# Patient Record
Sex: MALE | Race: WHITE | NOT HISPANIC OR LATINO
[De-identification: names, ages, dates, MRNs, and addresses within clinical notes are randomized per-mention and may not be internally consistent; named-entity substitution may affect disease eponyms.]

---

## 2018-10-05 PROBLEM — Z00.00 ENCOUNTER FOR PREVENTIVE HEALTH EXAMINATION: Status: ACTIVE | Noted: 2018-10-05

## 2018-10-11 ENCOUNTER — APPOINTMENT (OUTPATIENT)
Dept: PULMONOLOGY | Facility: CLINIC | Age: 83
End: 2018-10-11
Payer: MEDICARE

## 2018-10-11 VITALS
SYSTOLIC BLOOD PRESSURE: 120 MMHG | WEIGHT: 170 LBS | HEART RATE: 57 BPM | TEMPERATURE: 97.6 F | OXYGEN SATURATION: 97 % | HEIGHT: 71 IN | DIASTOLIC BLOOD PRESSURE: 70 MMHG | BODY MASS INDEX: 23.8 KG/M2

## 2018-10-11 DIAGNOSIS — R43.0 ANOSMIA: ICD-10-CM

## 2018-10-11 PROCEDURE — 99202 OFFICE O/P NEW SF 15 MIN: CPT | Mod: 25

## 2018-10-11 PROCEDURE — 94010 BREATHING CAPACITY TEST: CPT

## 2022-01-01 ENCOUNTER — INPATIENT (INPATIENT)
Facility: HOSPITAL | Age: 87
LOS: 14 days | DRG: 312 | End: 2022-01-30
Attending: STUDENT IN AN ORGANIZED HEALTH CARE EDUCATION/TRAINING PROGRAM | Admitting: INTERNAL MEDICINE
Payer: MEDICARE

## 2022-01-01 VITALS
HEART RATE: 67 BPM | OXYGEN SATURATION: 97 % | TEMPERATURE: 98 F | SYSTOLIC BLOOD PRESSURE: 156 MMHG | DIASTOLIC BLOOD PRESSURE: 76 MMHG | RESPIRATION RATE: 17 BRPM

## 2022-01-01 VITALS — RESPIRATION RATE: 20 BRPM | OXYGEN SATURATION: 93 % | HEART RATE: 71 BPM

## 2022-01-01 DIAGNOSIS — E44.0 MODERATE PROTEIN-CALORIE MALNUTRITION: ICD-10-CM

## 2022-01-01 DIAGNOSIS — R45.1 RESTLESSNESS AND AGITATION: ICD-10-CM

## 2022-01-01 DIAGNOSIS — I12.9 HYPERTENSIVE CHRONIC KIDNEY DISEASE WITH STAGE 1 THROUGH STAGE 4 CHRONIC KIDNEY DISEASE, OR UNSPECIFIED CHRONIC KIDNEY DISEASE: ICD-10-CM

## 2022-01-01 DIAGNOSIS — R79.89 OTHER SPECIFIED ABNORMAL FINDINGS OF BLOOD CHEMISTRY: ICD-10-CM
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
## 2022-01-01 DIAGNOSIS — I65.29 OCCLUSION AND STENOSIS OF UNSPECIFIED CAROTID ARTERY: ICD-10-CM

## 2022-01-01 DIAGNOSIS — R53.81 OTHER MALAISE: ICD-10-CM

## 2022-01-01 DIAGNOSIS — D63.1 ANEMIA IN CHRONIC KIDNEY DISEASE: ICD-10-CM

## 2022-01-01 DIAGNOSIS — N17.9 ACUTE KIDNEY FAILURE, UNSPECIFIED: ICD-10-CM
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
## 2022-01-01 DIAGNOSIS — E87.3 ALKALOSIS: ICD-10-CM

## 2022-01-01 DIAGNOSIS — J96.01 ACUTE RESPIRATORY FAILURE WITH HYPOXIA: ICD-10-CM

## 2022-01-01 DIAGNOSIS — G93.40 ENCEPHALOPATHY, UNSPECIFIED: ICD-10-CM

## 2022-01-01 DIAGNOSIS — Z29.9 ENCOUNTER FOR PROPHYLACTIC MEASURES, UNSPECIFIED: ICD-10-CM

## 2022-01-01 DIAGNOSIS — M19.041 PRIMARY OSTEOARTHRITIS, RIGHT HAND: ICD-10-CM

## 2022-01-01 DIAGNOSIS — R77.8 OTHER SPECIFIED ABNORMALITIES OF PLASMA PROTEINS: ICD-10-CM

## 2022-01-01 DIAGNOSIS — I48.91 UNSPECIFIED ATRIAL FIBRILLATION: ICD-10-CM

## 2022-01-01 DIAGNOSIS — N18.30 CHRONIC KIDNEY DISEASE, STAGE 3 UNSPECIFIED: ICD-10-CM

## 2022-01-01 DIAGNOSIS — G30.1 ALZHEIMER'S DISEASE WITH LATE ONSET: ICD-10-CM

## 2022-01-01 DIAGNOSIS — E78.5 HYPERLIPIDEMIA, UNSPECIFIED: ICD-10-CM

## 2022-01-01 DIAGNOSIS — R74.01 ELEVATION OF LEVELS OF LIVER TRANSAMINASE LEVELS: ICD-10-CM

## 2022-01-01 DIAGNOSIS — R55 SYNCOPE AND COLLAPSE: ICD-10-CM

## 2022-01-01 DIAGNOSIS — D64.9 ANEMIA, UNSPECIFIED: ICD-10-CM

## 2022-01-01 DIAGNOSIS — G93.89 OTHER SPECIFIED DISORDERS OF BRAIN: ICD-10-CM

## 2022-01-01 DIAGNOSIS — R00.1 BRADYCARDIA, UNSPECIFIED: ICD-10-CM

## 2022-01-01 DIAGNOSIS — I50.9 HEART FAILURE, UNSPECIFIED: ICD-10-CM

## 2022-01-01 DIAGNOSIS — Z95.828 PRESENCE OF OTHER VASCULAR IMPLANTS AND GRAFTS: Chronic | ICD-10-CM

## 2022-01-01 DIAGNOSIS — S00.03XA CONTUSION OF SCALP, INITIAL ENCOUNTER: ICD-10-CM

## 2022-01-01 DIAGNOSIS — I10 ESSENTIAL (PRIMARY) HYPERTENSION: ICD-10-CM

## 2022-01-01 DIAGNOSIS — J12.82 PNEUMONIA DUE TO CORONAVIRUS DISEASE 2019: ICD-10-CM

## 2022-01-01 DIAGNOSIS — R41.0 DISORIENTATION, UNSPECIFIED: ICD-10-CM

## 2022-01-01 DIAGNOSIS — Z91.81 HISTORY OF FALLING: ICD-10-CM

## 2022-01-01 DIAGNOSIS — Z51.5 ENCOUNTER FOR PALLIATIVE CARE: ICD-10-CM
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
## 2022-01-01 DIAGNOSIS — Z79.82 LONG TERM (CURRENT) USE OF ASPIRIN: ICD-10-CM
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
## 2022-01-01 DIAGNOSIS — M19.042 PRIMARY OSTEOARTHRITIS, LEFT HAND: ICD-10-CM

## 2022-01-01 DIAGNOSIS — Z90.49 ACQUIRED ABSENCE OF OTHER SPECIFIED PARTS OF DIGESTIVE TRACT: ICD-10-CM

## 2022-01-01 DIAGNOSIS — U07.1 COVID-19: ICD-10-CM

## 2022-01-01 DIAGNOSIS — Z79.02 LONG TERM (CURRENT) USE OF ANTITHROMBOTICS/ANTIPLATELETS: ICD-10-CM

## 2022-01-01 DIAGNOSIS — Z95.828 PRESENCE OF OTHER VASCULAR IMPLANTS AND GRAFTS: ICD-10-CM

## 2022-01-01 DIAGNOSIS — I65.23 OCCLUSION AND STENOSIS OF BILATERAL CAROTID ARTERIES: ICD-10-CM

## 2022-01-01 DIAGNOSIS — G31.9 DEGENERATIVE DISEASE OF NERVOUS SYSTEM, UNSPECIFIED: ICD-10-CM

## 2022-01-01 DIAGNOSIS — N40.0 BENIGN PROSTATIC HYPERPLASIA WITHOUT LOWER URINARY TRACT SYMPTOMS: ICD-10-CM

## 2022-01-01 DIAGNOSIS — Z66 DO NOT RESUSCITATE: ICD-10-CM

## 2022-01-01 DIAGNOSIS — N17.0 ACUTE KIDNEY FAILURE WITH TUBULAR NECROSIS: ICD-10-CM

## 2022-01-01 DIAGNOSIS — T82.856A STENOSIS OF PERIPHERAL VASCULAR STENT, INITIAL ENCOUNTER: ICD-10-CM

## 2022-01-01 DIAGNOSIS — Z85.038 PERSONAL HISTORY OF OTHER MALIGNANT NEOPLASM OF LARGE INTESTINE: ICD-10-CM

## 2022-01-01 DIAGNOSIS — Z90.49 ACQUIRED ABSENCE OF OTHER SPECIFIED PARTS OF DIGESTIVE TRACT: Chronic | ICD-10-CM

## 2022-01-01 DIAGNOSIS — R63.8 OTHER SYMPTOMS AND SIGNS CONCERNING FOOD AND FLUID INTAKE: ICD-10-CM

## 2022-01-01 DIAGNOSIS — F02.80 DEMENTIA IN OTHER DISEASES CLASSIFIED ELSEWHERE, UNSPECIFIED SEVERITY, WITHOUT BEHAVIORAL DISTURBANCE, PSYCHOTIC DISTURBANCE, MOOD DISTURBANCE, AND ANXIETY: ICD-10-CM
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
## 2022-01-01 DIAGNOSIS — Z78.1 PHYSICAL RESTRAINT STATUS: ICD-10-CM

## 2022-01-01 DIAGNOSIS — J18.9 PNEUMONIA, UNSPECIFIED ORGANISM: ICD-10-CM

## 2022-01-01 DIAGNOSIS — M48.00 SPINAL STENOSIS, SITE UNSPECIFIED: ICD-10-CM

## 2022-01-01 DIAGNOSIS — I45.10 UNSPECIFIED RIGHT BUNDLE-BRANCH BLOCK: ICD-10-CM

## 2022-01-01 LAB
A1C WITH ESTIMATED AVERAGE GLUCOSE RESULT: 5 % — SIGNIFICANT CHANGE UP (ref 4–5.6)
ALBUMIN SERPL ELPH-MCNC: 2.6 G/DL — LOW (ref 3.3–5)
ALBUMIN SERPL ELPH-MCNC: 2.6 G/DL — LOW (ref 3.3–5)
ALBUMIN SERPL ELPH-MCNC: 2.7 G/DL — LOW (ref 3.3–5)
ALBUMIN SERPL ELPH-MCNC: 2.7 G/DL — LOW (ref 3.3–5)
ALBUMIN SERPL ELPH-MCNC: 2.8 G/DL — LOW (ref 3.3–5)
ALBUMIN SERPL ELPH-MCNC: 2.8 G/DL — LOW (ref 3.3–5)
ALBUMIN SERPL ELPH-MCNC: 2.9 G/DL — LOW (ref 3.3–5)
ALBUMIN SERPL ELPH-MCNC: 3.1 G/DL — LOW (ref 3.3–5)
ALBUMIN SERPL ELPH-MCNC: 3.2 G/DL — LOW (ref 3.3–5)
ALBUMIN SERPL ELPH-MCNC: 3.6 G/DL — SIGNIFICANT CHANGE UP (ref 3.3–5)
ALP SERPL-CCNC: 161 U/L — HIGH (ref 40–120)
ALP SERPL-CCNC: 45 U/L — SIGNIFICANT CHANGE UP (ref 40–120)
ALP SERPL-CCNC: 45 U/L — SIGNIFICANT CHANGE UP (ref 40–120)
ALP SERPL-CCNC: 46 U/L — SIGNIFICANT CHANGE UP (ref 40–120)
ALP SERPL-CCNC: 61 U/L — SIGNIFICANT CHANGE UP (ref 40–120)
ALP SERPL-CCNC: 78 U/L — SIGNIFICANT CHANGE UP (ref 40–120)
ALP SERPL-CCNC: 81 U/L — SIGNIFICANT CHANGE UP (ref 40–120)
ALP SERPL-CCNC: 87 U/L — SIGNIFICANT CHANGE UP (ref 40–120)
ALP SERPL-CCNC: 88 U/L — SIGNIFICANT CHANGE UP (ref 40–120)
ALP SERPL-CCNC: 92 U/L — SIGNIFICANT CHANGE UP (ref 40–120)
ALT FLD-CCNC: 27 U/L — SIGNIFICANT CHANGE UP (ref 10–45)
ALT FLD-CCNC: 34 U/L — SIGNIFICANT CHANGE UP (ref 10–45)
ALT FLD-CCNC: 44 U/L — SIGNIFICANT CHANGE UP (ref 10–45)
ALT FLD-CCNC: 44 U/L — SIGNIFICANT CHANGE UP (ref 10–45)
ALT FLD-CCNC: 49 U/L — HIGH (ref 10–45)
ALT FLD-CCNC: 51 U/L — HIGH (ref 10–45)
ALT FLD-CCNC: 52 U/L — HIGH (ref 10–45)
ALT FLD-CCNC: 59 U/L — HIGH (ref 10–45)
ALT FLD-CCNC: 62 U/L — HIGH (ref 10–45)
ALT FLD-CCNC: 69 U/L — HIGH (ref 10–45)
ANION GAP SERPL CALC-SCNC: 11 MMOL/L — SIGNIFICANT CHANGE UP (ref 5–17)
ANION GAP SERPL CALC-SCNC: 12 MMOL/L — SIGNIFICANT CHANGE UP (ref 5–17)
ANION GAP SERPL CALC-SCNC: 12 MMOL/L — SIGNIFICANT CHANGE UP (ref 5–17)
ANION GAP SERPL CALC-SCNC: 13 MMOL/L — SIGNIFICANT CHANGE UP (ref 5–17)
ANION GAP SERPL CALC-SCNC: 14 MMOL/L — SIGNIFICANT CHANGE UP (ref 5–17)
ANION GAP SERPL CALC-SCNC: 15 MMOL/L — SIGNIFICANT CHANGE UP (ref 5–17)
ANION GAP SERPL CALC-SCNC: 18 MMOL/L — HIGH (ref 5–17)
ANION GAP SERPL CALC-SCNC: 18 MMOL/L — HIGH (ref 5–17)
ANION GAP SERPL CALC-SCNC: 8 MMOL/L — SIGNIFICANT CHANGE UP (ref 5–17)
ANISOCYTOSIS BLD QL: SLIGHT — SIGNIFICANT CHANGE UP
APPEARANCE UR: CLEAR — SIGNIFICANT CHANGE UP
APPEARANCE UR: CLEAR — SIGNIFICANT CHANGE UP
APTT BLD: 31.6 SEC — SIGNIFICANT CHANGE UP (ref 27.5–35.5)
AST SERPL-CCNC: 101 U/L — HIGH (ref 10–40)
AST SERPL-CCNC: 110 U/L — HIGH (ref 10–40)
AST SERPL-CCNC: 111 U/L — HIGH (ref 10–40)
AST SERPL-CCNC: 145 U/L — HIGH (ref 10–40)
AST SERPL-CCNC: 40 U/L — SIGNIFICANT CHANGE UP (ref 10–40)
AST SERPL-CCNC: 42 U/L — HIGH (ref 10–40)
AST SERPL-CCNC: 46 U/L — HIGH (ref 10–40)
AST SERPL-CCNC: 59 U/L — HIGH (ref 10–40)
AST SERPL-CCNC: 73 U/L — HIGH (ref 10–40)
AST SERPL-CCNC: 85 U/L — HIGH (ref 10–40)
BACTERIA # UR AUTO: PRESENT /HPF
BACTERIA # UR AUTO: PRESENT /HPF
BASOPHILS # BLD AUTO: 0 K/UL — SIGNIFICANT CHANGE UP (ref 0–0.2)
BASOPHILS # BLD AUTO: 0.01 K/UL — SIGNIFICANT CHANGE UP (ref 0–0.2)
BASOPHILS # BLD AUTO: 0.02 K/UL — SIGNIFICANT CHANGE UP (ref 0–0.2)
BASOPHILS NFR BLD AUTO: 0 % — SIGNIFICANT CHANGE UP (ref 0–2)
BASOPHILS NFR BLD AUTO: 0.1 % — SIGNIFICANT CHANGE UP (ref 0–2)
BILIRUB DIRECT SERPL-MCNC: 0.3 MG/DL — SIGNIFICANT CHANGE UP (ref 0–0.3)
BILIRUB DIRECT SERPL-MCNC: 0.5 MG/DL — HIGH (ref 0–0.3)
BILIRUB INDIRECT FLD-MCNC: 0.6 MG/DL — SIGNIFICANT CHANGE UP (ref 0.2–1)
BILIRUB INDIRECT FLD-MCNC: 0.7 MG/DL — SIGNIFICANT CHANGE UP (ref 0.2–1)
BILIRUB SERPL-MCNC: 0.9 MG/DL — SIGNIFICANT CHANGE UP (ref 0.2–1.2)
BILIRUB SERPL-MCNC: 1 MG/DL — SIGNIFICANT CHANGE UP (ref 0.2–1.2)
BILIRUB SERPL-MCNC: 1.1 MG/DL — SIGNIFICANT CHANGE UP (ref 0.2–1.2)
BILIRUB SERPL-MCNC: 1.2 MG/DL — SIGNIFICANT CHANGE UP (ref 0.2–1.2)
BILIRUB SERPL-MCNC: 1.7 MG/DL — HIGH (ref 0.2–1.2)
BILIRUB UR-MCNC: NEGATIVE — SIGNIFICANT CHANGE UP
BILIRUB UR-MCNC: NEGATIVE — SIGNIFICANT CHANGE UP
BLD GP AB SCN SERPL QL: NEGATIVE — SIGNIFICANT CHANGE UP
BLD GP AB SCN SERPL QL: NEGATIVE — SIGNIFICANT CHANGE UP
BUN SERPL-MCNC: 28 MG/DL — HIGH (ref 7–23)
BUN SERPL-MCNC: 31 MG/DL — HIGH (ref 7–23)
BUN SERPL-MCNC: 32 MG/DL — HIGH (ref 7–23)
BUN SERPL-MCNC: 32 MG/DL — HIGH (ref 7–23)
BUN SERPL-MCNC: 34 MG/DL — HIGH (ref 7–23)
BUN SERPL-MCNC: 35 MG/DL — HIGH (ref 7–23)
BUN SERPL-MCNC: 36 MG/DL — HIGH (ref 7–23)
BUN SERPL-MCNC: 37 MG/DL — HIGH (ref 7–23)
BUN SERPL-MCNC: 38 MG/DL — HIGH (ref 7–23)
BUN SERPL-MCNC: 39 MG/DL — HIGH (ref 7–23)
BUN SERPL-MCNC: 42 MG/DL — HIGH (ref 7–23)
BUN SERPL-MCNC: 44 MG/DL — HIGH (ref 7–23)
BUN SERPL-MCNC: 53 MG/DL — HIGH (ref 7–23)
BUN SERPL-MCNC: 56 MG/DL — HIGH (ref 7–23)
BURR CELLS BLD QL SMEAR: PRESENT — SIGNIFICANT CHANGE UP
BURR CELLS BLD QL SMEAR: PRESENT — SIGNIFICANT CHANGE UP
CALCIUM SERPL-MCNC: 7.5 MG/DL — LOW (ref 8.4–10.5)
CALCIUM SERPL-MCNC: 7.7 MG/DL — LOW (ref 8.4–10.5)
CALCIUM SERPL-MCNC: 7.7 MG/DL — LOW (ref 8.4–10.5)
CALCIUM SERPL-MCNC: 7.8 MG/DL — LOW (ref 8.4–10.5)
CALCIUM SERPL-MCNC: 7.9 MG/DL — LOW (ref 8.4–10.5)
CALCIUM SERPL-MCNC: 8 MG/DL — LOW (ref 8.4–10.5)
CALCIUM SERPL-MCNC: 8 MG/DL — LOW (ref 8.4–10.5)
CALCIUM SERPL-MCNC: 8.1 MG/DL — LOW (ref 8.4–10.5)
CALCIUM SERPL-MCNC: 8.2 MG/DL — LOW (ref 8.4–10.5)
CALCIUM SERPL-MCNC: 8.3 MG/DL — LOW (ref 8.4–10.5)
CALCIUM SERPL-MCNC: 8.3 MG/DL — LOW (ref 8.4–10.5)
CALCIUM SERPL-MCNC: 8.4 MG/DL — SIGNIFICANT CHANGE UP (ref 8.4–10.5)
CALCIUM SERPL-MCNC: 8.5 MG/DL — SIGNIFICANT CHANGE UP (ref 8.4–10.5)
CALCIUM SERPL-MCNC: 8.5 MG/DL — SIGNIFICANT CHANGE UP (ref 8.4–10.5)
CALCIUM SERPL-MCNC: 8.6 MG/DL — SIGNIFICANT CHANGE UP (ref 8.4–10.5)
CALCIUM SERPL-MCNC: 8.8 MG/DL — SIGNIFICANT CHANGE UP (ref 8.4–10.5)
CHLORIDE SERPL-SCNC: 101 MMOL/L — SIGNIFICANT CHANGE UP (ref 96–108)
CHLORIDE SERPL-SCNC: 102 MMOL/L — SIGNIFICANT CHANGE UP (ref 96–108)
CHLORIDE SERPL-SCNC: 102 MMOL/L — SIGNIFICANT CHANGE UP (ref 96–108)
CHLORIDE SERPL-SCNC: 103 MMOL/L — SIGNIFICANT CHANGE UP (ref 96–108)
CHLORIDE SERPL-SCNC: 105 MMOL/L — SIGNIFICANT CHANGE UP (ref 96–108)
CHLORIDE SERPL-SCNC: 105 MMOL/L — SIGNIFICANT CHANGE UP (ref 96–108)
CHLORIDE SERPL-SCNC: 106 MMOL/L — SIGNIFICANT CHANGE UP (ref 96–108)
CHLORIDE SERPL-SCNC: 106 MMOL/L — SIGNIFICANT CHANGE UP (ref 96–108)
CHLORIDE SERPL-SCNC: 109 MMOL/L — HIGH (ref 96–108)
CHLORIDE SERPL-SCNC: 110 MMOL/L — HIGH (ref 96–108)
CHLORIDE SERPL-SCNC: 111 MMOL/L — HIGH (ref 96–108)
CHLORIDE SERPL-SCNC: 111 MMOL/L — HIGH (ref 96–108)
CHLORIDE SERPL-SCNC: 113 MMOL/L — HIGH (ref 96–108)
CHLORIDE SERPL-SCNC: 113 MMOL/L — HIGH (ref 96–108)
CHLORIDE SERPL-SCNC: 114 MMOL/L — HIGH (ref 96–108)
CHLORIDE SERPL-SCNC: 115 MMOL/L — HIGH (ref 96–108)
CHOLEST SERPL-MCNC: 95 MG/DL — SIGNIFICANT CHANGE UP
CK MB CFR SERPL CALC: 11.4 NG/ML — HIGH (ref 0–6.7)
CK MB CFR SERPL CALC: 14.6 NG/ML — HIGH (ref 0–6.7)
CK MB CFR SERPL CALC: 18.5 NG/ML — HIGH (ref 0–6.7)
CK SERPL-CCNC: 1843 U/L — HIGH (ref 30–200)
CK SERPL-CCNC: 201 U/L — HIGH (ref 30–200)
CK SERPL-CCNC: 2341 U/L — HIGH (ref 30–200)
CK SERPL-CCNC: 2974 U/L — HIGH (ref 30–200)
CO2 SERPL-SCNC: 14 MMOL/L — LOW (ref 22–31)
CO2 SERPL-SCNC: 17 MMOL/L — LOW (ref 22–31)
CO2 SERPL-SCNC: 18 MMOL/L — LOW (ref 22–31)
CO2 SERPL-SCNC: 19 MMOL/L — LOW (ref 22–31)
CO2 SERPL-SCNC: 20 MMOL/L — LOW (ref 22–31)
CO2 SERPL-SCNC: 20 MMOL/L — LOW (ref 22–31)
CO2 SERPL-SCNC: 22 MMOL/L — SIGNIFICANT CHANGE UP (ref 22–31)
CO2 SERPL-SCNC: 22 MMOL/L — SIGNIFICANT CHANGE UP (ref 22–31)
COLOR SPEC: YELLOW — SIGNIFICANT CHANGE UP
COLOR SPEC: YELLOW — SIGNIFICANT CHANGE UP
COMMENT - URINE: SIGNIFICANT CHANGE UP
COMMENT - URINE: SIGNIFICANT CHANGE UP
CREAT ?TM UR-MCNC: 151 MG/DL — SIGNIFICANT CHANGE UP
CREAT SERPL-MCNC: 0.96 MG/DL — SIGNIFICANT CHANGE UP (ref 0.5–1.3)
CREAT SERPL-MCNC: 1.05 MG/DL — SIGNIFICANT CHANGE UP (ref 0.5–1.3)
CREAT SERPL-MCNC: 1.06 MG/DL — SIGNIFICANT CHANGE UP (ref 0.5–1.3)
CREAT SERPL-MCNC: 1.09 MG/DL — SIGNIFICANT CHANGE UP (ref 0.5–1.3)
CREAT SERPL-MCNC: 1.14 MG/DL — SIGNIFICANT CHANGE UP (ref 0.5–1.3)
CREAT SERPL-MCNC: 1.14 MG/DL — SIGNIFICANT CHANGE UP (ref 0.5–1.3)
CREAT SERPL-MCNC: 1.15 MG/DL — SIGNIFICANT CHANGE UP (ref 0.5–1.3)
CREAT SERPL-MCNC: 1.19 MG/DL — SIGNIFICANT CHANGE UP (ref 0.5–1.3)
CREAT SERPL-MCNC: 1.2 MG/DL — SIGNIFICANT CHANGE UP (ref 0.5–1.3)
CREAT SERPL-MCNC: 1.29 MG/DL — SIGNIFICANT CHANGE UP (ref 0.5–1.3)
CREAT SERPL-MCNC: 1.31 MG/DL — HIGH (ref 0.5–1.3)
CREAT SERPL-MCNC: 1.33 MG/DL — HIGH (ref 0.5–1.3)
CREAT SERPL-MCNC: 1.33 MG/DL — HIGH (ref 0.5–1.3)
CREAT SERPL-MCNC: 1.35 MG/DL — HIGH (ref 0.5–1.3)
CREAT SERPL-MCNC: 1.36 MG/DL — HIGH (ref 0.5–1.3)
CREAT SERPL-MCNC: 1.44 MG/DL — HIGH (ref 0.5–1.3)
CRP SERPL-MCNC: 111 MG/L — HIGH (ref 0–4)
CRP SERPL-MCNC: 120.1 MG/L — HIGH (ref 0–4)
CRP SERPL-MCNC: 150.3 MG/L — HIGH (ref 0–4)
CRP SERPL-MCNC: 181.2 MG/L — HIGH (ref 0–4)
CRP SERPL-MCNC: 228.9 MG/L — HIGH (ref 0–4)
CRP SERPL-MCNC: 61.8 MG/L — HIGH (ref 0–4)
CULTURE RESULTS: SIGNIFICANT CHANGE UP
D DIMER BLD IA.RAPID-MCNC: 1727 NG/ML DDU — HIGH
D DIMER BLD IA.RAPID-MCNC: 285 NG/ML DDU — HIGH
D DIMER BLD IA.RAPID-MCNC: 306 NG/ML DDU — HIGH
D DIMER BLD IA.RAPID-MCNC: 338 NG/ML DDU — HIGH
D DIMER BLD IA.RAPID-MCNC: 413 NG/ML DDU — HIGH
DIFF PNL FLD: ABNORMAL
DIFF PNL FLD: ABNORMAL
EOSINOPHIL # BLD AUTO: 0 K/UL — SIGNIFICANT CHANGE UP (ref 0–0.5)
EOSINOPHIL NFR BLD AUTO: 0 % — SIGNIFICANT CHANGE UP (ref 0–6)
EPI CELLS # UR: SIGNIFICANT CHANGE UP /HPF (ref 0–5)
EPI CELLS # UR: SIGNIFICANT CHANGE UP /HPF (ref 0–5)
ERYTHROCYTE [SEDIMENTATION RATE] IN BLOOD: 30 MM/HR — HIGH
ERYTHROCYTE [SEDIMENTATION RATE] IN BLOOD: 51 MM/HR — HIGH
ERYTHROCYTE [SEDIMENTATION RATE] IN BLOOD: 53 MM/HR — HIGH
ERYTHROCYTE [SEDIMENTATION RATE] IN BLOOD: 78 MM/HR — HIGH
ESTIMATED AVERAGE GLUCOSE: 97 MG/DL — SIGNIFICANT CHANGE UP (ref 68–114)
FERRITIN SERPL-MCNC: 1066 NG/ML — HIGH (ref 30–400)
FERRITIN SERPL-MCNC: 1537 NG/ML — HIGH (ref 30–400)
FERRITIN SERPL-MCNC: 1982 NG/ML — HIGH (ref 30–400)
FERRITIN SERPL-MCNC: 329 NG/ML — SIGNIFICANT CHANGE UP (ref 30–400)
FERRITIN SERPL-MCNC: 465 NG/ML — HIGH (ref 30–400)
FOLATE SERPL-MCNC: 10.9 NG/ML — SIGNIFICANT CHANGE UP
GAS PNL BLDA: SIGNIFICANT CHANGE UP
GIANT PLATELETS BLD QL SMEAR: PRESENT — SIGNIFICANT CHANGE UP
GLUCOSE BLDC GLUCOMTR-MCNC: 100 MG/DL — HIGH (ref 70–99)
GLUCOSE BLDC GLUCOMTR-MCNC: 101 MG/DL — HIGH (ref 70–99)
GLUCOSE BLDC GLUCOMTR-MCNC: 103 MG/DL — HIGH (ref 70–99)
GLUCOSE BLDC GLUCOMTR-MCNC: 106 MG/DL — HIGH (ref 70–99)
GLUCOSE BLDC GLUCOMTR-MCNC: 106 MG/DL — HIGH (ref 70–99)
GLUCOSE BLDC GLUCOMTR-MCNC: 108 MG/DL — HIGH (ref 70–99)
GLUCOSE BLDC GLUCOMTR-MCNC: 109 MG/DL — HIGH (ref 70–99)
GLUCOSE BLDC GLUCOMTR-MCNC: 116 MG/DL — HIGH (ref 70–99)
GLUCOSE BLDC GLUCOMTR-MCNC: 118 MG/DL — HIGH (ref 70–99)
GLUCOSE BLDC GLUCOMTR-MCNC: 119 MG/DL — HIGH (ref 70–99)
GLUCOSE BLDC GLUCOMTR-MCNC: 127 MG/DL — HIGH (ref 70–99)
GLUCOSE BLDC GLUCOMTR-MCNC: 130 MG/DL — HIGH (ref 70–99)
GLUCOSE BLDC GLUCOMTR-MCNC: 131 MG/DL — HIGH (ref 70–99)
GLUCOSE BLDC GLUCOMTR-MCNC: 131 MG/DL — HIGH (ref 70–99)
GLUCOSE BLDC GLUCOMTR-MCNC: 133 MG/DL — HIGH (ref 70–99)
GLUCOSE BLDC GLUCOMTR-MCNC: 135 MG/DL — HIGH (ref 70–99)
GLUCOSE BLDC GLUCOMTR-MCNC: 139 MG/DL — HIGH (ref 70–99)
GLUCOSE BLDC GLUCOMTR-MCNC: 139 MG/DL — HIGH (ref 70–99)
GLUCOSE BLDC GLUCOMTR-MCNC: 143 MG/DL — HIGH (ref 70–99)
GLUCOSE BLDC GLUCOMTR-MCNC: 148 MG/DL — HIGH (ref 70–99)
GLUCOSE BLDC GLUCOMTR-MCNC: 148 MG/DL — HIGH (ref 70–99)
GLUCOSE BLDC GLUCOMTR-MCNC: 154 MG/DL — HIGH (ref 70–99)
GLUCOSE BLDC GLUCOMTR-MCNC: 154 MG/DL — HIGH (ref 70–99)
GLUCOSE BLDC GLUCOMTR-MCNC: 163 MG/DL — HIGH (ref 70–99)
GLUCOSE BLDC GLUCOMTR-MCNC: 167 MG/DL — HIGH (ref 70–99)
GLUCOSE BLDC GLUCOMTR-MCNC: 172 MG/DL — HIGH (ref 70–99)
GLUCOSE BLDC GLUCOMTR-MCNC: 174 MG/DL — HIGH (ref 70–99)
GLUCOSE BLDC GLUCOMTR-MCNC: 177 MG/DL — HIGH (ref 70–99)
GLUCOSE BLDC GLUCOMTR-MCNC: 178 MG/DL — HIGH (ref 70–99)
GLUCOSE BLDC GLUCOMTR-MCNC: 187 MG/DL — HIGH (ref 70–99)
GLUCOSE BLDC GLUCOMTR-MCNC: 188 MG/DL — HIGH (ref 70–99)
GLUCOSE BLDC GLUCOMTR-MCNC: 207 MG/DL — HIGH (ref 70–99)
GLUCOSE BLDC GLUCOMTR-MCNC: 233 MG/DL — HIGH (ref 70–99)
GLUCOSE BLDC GLUCOMTR-MCNC: 80 MG/DL — SIGNIFICANT CHANGE UP (ref 70–99)
GLUCOSE BLDC GLUCOMTR-MCNC: 86 MG/DL — SIGNIFICANT CHANGE UP (ref 70–99)
GLUCOSE BLDC GLUCOMTR-MCNC: 87 MG/DL — SIGNIFICANT CHANGE UP (ref 70–99)
GLUCOSE BLDC GLUCOMTR-MCNC: 88 MG/DL — SIGNIFICANT CHANGE UP (ref 70–99)
GLUCOSE BLDC GLUCOMTR-MCNC: 91 MG/DL — SIGNIFICANT CHANGE UP (ref 70–99)
GLUCOSE BLDC GLUCOMTR-MCNC: 94 MG/DL — SIGNIFICANT CHANGE UP (ref 70–99)
GLUCOSE BLDC GLUCOMTR-MCNC: 94 MG/DL — SIGNIFICANT CHANGE UP (ref 70–99)
GLUCOSE BLDC GLUCOMTR-MCNC: 95 MG/DL — SIGNIFICANT CHANGE UP (ref 70–99)
GLUCOSE BLDC GLUCOMTR-MCNC: 97 MG/DL — SIGNIFICANT CHANGE UP (ref 70–99)
GLUCOSE BLDC GLUCOMTR-MCNC: 99 MG/DL — SIGNIFICANT CHANGE UP (ref 70–99)
GLUCOSE SERPL-MCNC: 100 MG/DL — HIGH (ref 70–99)
GLUCOSE SERPL-MCNC: 108 MG/DL — HIGH (ref 70–99)
GLUCOSE SERPL-MCNC: 110 MG/DL — HIGH (ref 70–99)
GLUCOSE SERPL-MCNC: 110 MG/DL — HIGH (ref 70–99)
GLUCOSE SERPL-MCNC: 112 MG/DL — HIGH (ref 70–99)
GLUCOSE SERPL-MCNC: 113 MG/DL — HIGH (ref 70–99)
GLUCOSE SERPL-MCNC: 115 MG/DL — HIGH (ref 70–99)
GLUCOSE SERPL-MCNC: 116 MG/DL — HIGH (ref 70–99)
GLUCOSE SERPL-MCNC: 117 MG/DL — HIGH (ref 70–99)
GLUCOSE SERPL-MCNC: 142 MG/DL — HIGH (ref 70–99)
GLUCOSE SERPL-MCNC: 146 MG/DL — HIGH (ref 70–99)
GLUCOSE SERPL-MCNC: 155 MG/DL — HIGH (ref 70–99)
GLUCOSE SERPL-MCNC: 69 MG/DL — LOW (ref 70–99)
GLUCOSE SERPL-MCNC: 81 MG/DL — SIGNIFICANT CHANGE UP (ref 70–99)
GLUCOSE SERPL-MCNC: 92 MG/DL — SIGNIFICANT CHANGE UP (ref 70–99)
GLUCOSE SERPL-MCNC: 96 MG/DL — SIGNIFICANT CHANGE UP (ref 70–99)
GLUCOSE UR QL: NEGATIVE — SIGNIFICANT CHANGE UP
GLUCOSE UR QL: NEGATIVE — SIGNIFICANT CHANGE UP
GRAN CASTS # UR COMP ASSIST: ABNORMAL /LPF
GRAN CASTS # UR COMP ASSIST: ABNORMAL /LPF
HCT VFR BLD CALC: 30.5 % — LOW (ref 39–50)
HCT VFR BLD CALC: 33.1 % — LOW (ref 39–50)
HCT VFR BLD CALC: 33.6 % — LOW (ref 39–50)
HCT VFR BLD CALC: 33.8 % — LOW (ref 39–50)
HCT VFR BLD CALC: 34.4 % — LOW (ref 39–50)
HCT VFR BLD CALC: 34.5 % — LOW (ref 39–50)
HCT VFR BLD CALC: 34.9 % — LOW (ref 39–50)
HCT VFR BLD CALC: 35.2 % — LOW (ref 39–50)
HCT VFR BLD CALC: 35.7 % — LOW (ref 39–50)
HCT VFR BLD CALC: 35.8 % — LOW (ref 39–50)
HCT VFR BLD CALC: 35.9 % — LOW (ref 39–50)
HCT VFR BLD CALC: 36.1 % — LOW (ref 39–50)
HCT VFR BLD CALC: 36.6 % — LOW (ref 39–50)
HCT VFR BLD CALC: 36.9 % — LOW (ref 39–50)
HDLC SERPL-MCNC: 37 MG/DL — LOW
HGB BLD-MCNC: 10.3 G/DL — LOW (ref 13–17)
HGB BLD-MCNC: 10.8 G/DL — LOW (ref 13–17)
HGB BLD-MCNC: 10.8 G/DL — LOW (ref 13–17)
HGB BLD-MCNC: 10.9 G/DL — LOW (ref 13–17)
HGB BLD-MCNC: 11.2 G/DL — LOW (ref 13–17)
HGB BLD-MCNC: 11.4 G/DL — LOW (ref 13–17)
HGB BLD-MCNC: 11.7 G/DL — LOW (ref 13–17)
HGB BLD-MCNC: 11.8 G/DL — LOW (ref 13–17)
HGB BLD-MCNC: 11.8 G/DL — LOW (ref 13–17)
HGB BLD-MCNC: 11.9 G/DL — LOW (ref 13–17)
HGB BLD-MCNC: 12.1 G/DL — LOW (ref 13–17)
HGB BLD-MCNC: 9.8 G/DL — LOW (ref 13–17)
HYALINE CASTS # UR AUTO: ABNORMAL /LPF (ref 0–2)
IMM GRANULOCYTES NFR BLD AUTO: 0.3 % — SIGNIFICANT CHANGE UP (ref 0–1.5)
IMM GRANULOCYTES NFR BLD AUTO: 0.6 % — SIGNIFICANT CHANGE UP (ref 0–1.5)
IMM GRANULOCYTES NFR BLD AUTO: 1 % — SIGNIFICANT CHANGE UP (ref 0–1.5)
IMM GRANULOCYTES NFR BLD AUTO: 1.1 % — SIGNIFICANT CHANGE UP (ref 0–1.5)
INR BLD: 1.5 — HIGH (ref 0.88–1.16)
IRON SATN MFR SERPL: 13 UG/DL — LOW (ref 45–165)
IRON SATN MFR SERPL: 8 % — LOW (ref 16–55)
KETONES UR-MCNC: 15 MG/DL
KETONES UR-MCNC: ABNORMAL MG/DL
LDH SERPL L TO P-CCNC: 327 U/L — HIGH (ref 50–242)
LDH SERPL L TO P-CCNC: 382 U/L — HIGH (ref 50–242)
LDH SERPL L TO P-CCNC: 665 U/L — HIGH (ref 50–242)
LEUKOCYTE ESTERASE UR-ACNC: NEGATIVE — SIGNIFICANT CHANGE UP
LEUKOCYTE ESTERASE UR-ACNC: NEGATIVE — SIGNIFICANT CHANGE UP
LIPID PNL WITH DIRECT LDL SERPL: 45 MG/DL — SIGNIFICANT CHANGE UP
LYMPHOCYTES # BLD AUTO: 0 % — LOW (ref 13–44)
LYMPHOCYTES # BLD AUTO: 0 K/UL — LOW (ref 1–3.3)
LYMPHOCYTES # BLD AUTO: 0.2 K/UL — LOW (ref 1–3.3)
LYMPHOCYTES # BLD AUTO: 0.21 K/UL — LOW (ref 1–3.3)
LYMPHOCYTES # BLD AUTO: 0.27 K/UL — LOW (ref 1–3.3)
LYMPHOCYTES # BLD AUTO: 0.31 K/UL — LOW (ref 1–3.3)
LYMPHOCYTES # BLD AUTO: 0.35 K/UL — LOW (ref 1–3.3)
LYMPHOCYTES # BLD AUTO: 0.36 K/UL — LOW (ref 1–3.3)
LYMPHOCYTES # BLD AUTO: 0.46 K/UL — LOW (ref 1–3.3)
LYMPHOCYTES # BLD AUTO: 1.8 % — LOW (ref 13–44)
LYMPHOCYTES # BLD AUTO: 2 % — LOW (ref 13–44)
LYMPHOCYTES # BLD AUTO: 2.6 % — LOW (ref 13–44)
LYMPHOCYTES # BLD AUTO: 2.7 % — LOW (ref 13–44)
LYMPHOCYTES # BLD AUTO: 3.2 % — LOW (ref 13–44)
LYMPHOCYTES # BLD AUTO: 3.8 % — LOW (ref 13–44)
LYMPHOCYTES # BLD AUTO: 4.1 % — LOW (ref 13–44)
MACROCYTES BLD QL: SLIGHT — SIGNIFICANT CHANGE UP
MAGNESIUM SERPL-MCNC: 1.8 MG/DL — SIGNIFICANT CHANGE UP (ref 1.6–2.6)
MAGNESIUM SERPL-MCNC: 1.9 MG/DL — SIGNIFICANT CHANGE UP (ref 1.6–2.6)
MAGNESIUM SERPL-MCNC: 2 MG/DL — SIGNIFICANT CHANGE UP (ref 1.6–2.6)
MAGNESIUM SERPL-MCNC: 2.2 MG/DL — SIGNIFICANT CHANGE UP (ref 1.6–2.6)
MAGNESIUM SERPL-MCNC: 2.3 MG/DL — SIGNIFICANT CHANGE UP (ref 1.6–2.6)
MAGNESIUM SERPL-MCNC: 2.3 MG/DL — SIGNIFICANT CHANGE UP (ref 1.6–2.6)
MAGNESIUM SERPL-MCNC: 2.4 MG/DL — SIGNIFICANT CHANGE UP (ref 1.6–2.6)
MAGNESIUM SERPL-MCNC: 2.4 MG/DL — SIGNIFICANT CHANGE UP (ref 1.6–2.6)
MAGNESIUM SERPL-MCNC: 2.5 MG/DL — SIGNIFICANT CHANGE UP (ref 1.6–2.6)
MAGNESIUM SERPL-MCNC: 2.7 MG/DL — HIGH (ref 1.6–2.6)
MAGNESIUM SERPL-MCNC: 2.8 MG/DL — HIGH (ref 1.6–2.6)
MAGNESIUM SERPL-MCNC: 2.9 MG/DL — HIGH (ref 1.6–2.6)
MAGNESIUM SERPL-MCNC: 2.9 MG/DL — HIGH (ref 1.6–2.6)
MANUAL SMEAR VERIFICATION: SIGNIFICANT CHANGE UP
MCHC RBC-ENTMCNC: 30.1 PG — SIGNIFICANT CHANGE UP (ref 27–34)
MCHC RBC-ENTMCNC: 30.1 PG — SIGNIFICANT CHANGE UP (ref 27–34)
MCHC RBC-ENTMCNC: 30.5 PG — SIGNIFICANT CHANGE UP (ref 27–34)
MCHC RBC-ENTMCNC: 30.6 PG — SIGNIFICANT CHANGE UP (ref 27–34)
MCHC RBC-ENTMCNC: 30.9 PG — SIGNIFICANT CHANGE UP (ref 27–34)
MCHC RBC-ENTMCNC: 31 PG — SIGNIFICANT CHANGE UP (ref 27–34)
MCHC RBC-ENTMCNC: 31.1 GM/DL — LOW (ref 32–36)
MCHC RBC-ENTMCNC: 31.1 PG — SIGNIFICANT CHANGE UP (ref 27–34)
MCHC RBC-ENTMCNC: 31.2 GM/DL — LOW (ref 32–36)
MCHC RBC-ENTMCNC: 31.2 PG — SIGNIFICANT CHANGE UP (ref 27–34)
MCHC RBC-ENTMCNC: 31.3 GM/DL — LOW (ref 32–36)
MCHC RBC-ENTMCNC: 31.3 PG — SIGNIFICANT CHANGE UP (ref 27–34)
MCHC RBC-ENTMCNC: 31.4 PG — SIGNIFICANT CHANGE UP (ref 27–34)
MCHC RBC-ENTMCNC: 31.6 PG — SIGNIFICANT CHANGE UP (ref 27–34)
MCHC RBC-ENTMCNC: 31.7 GM/DL — LOW (ref 32–36)
MCHC RBC-ENTMCNC: 31.7 PG — SIGNIFICANT CHANGE UP (ref 27–34)
MCHC RBC-ENTMCNC: 31.8 GM/DL — LOW (ref 32–36)
MCHC RBC-ENTMCNC: 32 GM/DL — SIGNIFICANT CHANGE UP (ref 32–36)
MCHC RBC-ENTMCNC: 32.1 GM/DL — SIGNIFICANT CHANGE UP (ref 32–36)
MCHC RBC-ENTMCNC: 32.1 GM/DL — SIGNIFICANT CHANGE UP (ref 32–36)
MCHC RBC-ENTMCNC: 32.2 GM/DL — SIGNIFICANT CHANGE UP (ref 32–36)
MCHC RBC-ENTMCNC: 32.2 PG — SIGNIFICANT CHANGE UP (ref 27–34)
MCHC RBC-ENTMCNC: 32.3 PG — SIGNIFICANT CHANGE UP (ref 27–34)
MCHC RBC-ENTMCNC: 32.7 GM/DL — SIGNIFICANT CHANGE UP (ref 32–36)
MCHC RBC-ENTMCNC: 32.8 GM/DL — SIGNIFICANT CHANGE UP (ref 32–36)
MCHC RBC-ENTMCNC: 32.8 GM/DL — SIGNIFICANT CHANGE UP (ref 32–36)
MCHC RBC-ENTMCNC: 33.2 GM/DL — SIGNIFICANT CHANGE UP (ref 32–36)
MCHC RBC-ENTMCNC: 33.9 GM/DL — SIGNIFICANT CHANGE UP (ref 32–36)
MCV RBC AUTO: 95.2 FL — SIGNIFICANT CHANGE UP (ref 80–100)
MCV RBC AUTO: 95.2 FL — SIGNIFICANT CHANGE UP (ref 80–100)
MCV RBC AUTO: 95.4 FL — SIGNIFICANT CHANGE UP (ref 80–100)
MCV RBC AUTO: 95.7 FL — SIGNIFICANT CHANGE UP (ref 80–100)
MCV RBC AUTO: 96.1 FL — SIGNIFICANT CHANGE UP (ref 80–100)
MCV RBC AUTO: 96.1 FL — SIGNIFICANT CHANGE UP (ref 80–100)
MCV RBC AUTO: 96.8 FL — SIGNIFICANT CHANGE UP (ref 80–100)
MCV RBC AUTO: 97 FL — SIGNIFICANT CHANGE UP (ref 80–100)
MCV RBC AUTO: 97.2 FL — SIGNIFICANT CHANGE UP (ref 80–100)
MCV RBC AUTO: 97.4 FL — SIGNIFICANT CHANGE UP (ref 80–100)
MCV RBC AUTO: 97.7 FL — SIGNIFICANT CHANGE UP (ref 80–100)
MCV RBC AUTO: 97.8 FL — SIGNIFICANT CHANGE UP (ref 80–100)
MCV RBC AUTO: 98 FL — SIGNIFICANT CHANGE UP (ref 80–100)
MCV RBC AUTO: 98.1 FL — SIGNIFICANT CHANGE UP (ref 80–100)
MICROCYTES BLD QL: SLIGHT — SIGNIFICANT CHANGE UP
MICROCYTES BLD QL: SLIGHT — SIGNIFICANT CHANGE UP
MONOCYTES # BLD AUTO: 0 K/UL — SIGNIFICANT CHANGE UP (ref 0–0.9)
MONOCYTES # BLD AUTO: 0.2 K/UL — SIGNIFICANT CHANGE UP (ref 0–0.9)
MONOCYTES # BLD AUTO: 0.23 K/UL — SIGNIFICANT CHANGE UP (ref 0–0.9)
MONOCYTES # BLD AUTO: 0.27 K/UL — SIGNIFICANT CHANGE UP (ref 0–0.9)
MONOCYTES # BLD AUTO: 0.28 K/UL — SIGNIFICANT CHANGE UP (ref 0–0.9)
MONOCYTES # BLD AUTO: 0.32 K/UL — SIGNIFICANT CHANGE UP (ref 0–0.9)
MONOCYTES # BLD AUTO: 0.32 K/UL — SIGNIFICANT CHANGE UP (ref 0–0.9)
MONOCYTES # BLD AUTO: 0.43 K/UL — SIGNIFICANT CHANGE UP (ref 0–0.9)
MONOCYTES NFR BLD AUTO: 0 % — LOW (ref 2–14)
MONOCYTES NFR BLD AUTO: 0.9 % — LOW (ref 2–14)
MONOCYTES NFR BLD AUTO: 1.7 % — LOW (ref 2–14)
MONOCYTES NFR BLD AUTO: 2.7 % — SIGNIFICANT CHANGE UP (ref 2–14)
MONOCYTES NFR BLD AUTO: 2.9 % — SIGNIFICANT CHANGE UP (ref 2–14)
MONOCYTES NFR BLD AUTO: 3.1 % — SIGNIFICANT CHANGE UP (ref 2–14)
MONOCYTES NFR BLD AUTO: 3.2 % — SIGNIFICANT CHANGE UP (ref 2–14)
MONOCYTES NFR BLD AUTO: 4.4 % — SIGNIFICANT CHANGE UP (ref 2–14)
MRSA PCR RESULT.: NEGATIVE — SIGNIFICANT CHANGE UP
MYELOCYTES NFR BLD: 0.9 % — HIGH (ref 0–0)
NEUTROPHILS # BLD AUTO: 11.09 K/UL — HIGH (ref 1.8–7.4)
NEUTROPHILS # BLD AUTO: 11.43 K/UL — HIGH (ref 1.8–7.4)
NEUTROPHILS # BLD AUTO: 12.69 K/UL — HIGH (ref 1.8–7.4)
NEUTROPHILS # BLD AUTO: 13.23 K/UL — HIGH (ref 1.8–7.4)
NEUTROPHILS # BLD AUTO: 22.12 K/UL — HIGH (ref 1.8–7.4)
NEUTROPHILS # BLD AUTO: 6.76 K/UL — SIGNIFICANT CHANGE UP (ref 1.8–7.4)
NEUTROPHILS # BLD AUTO: 7.93 K/UL — HIGH (ref 1.8–7.4)
NEUTROPHILS # BLD AUTO: 8.9 K/UL — HIGH (ref 1.8–7.4)
NEUTROPHILS NFR BLD AUTO: 92.3 % — HIGH (ref 43–77)
NEUTROPHILS NFR BLD AUTO: 92.4 % — HIGH (ref 43–77)
NEUTROPHILS NFR BLD AUTO: 92.9 % — HIGH (ref 43–77)
NEUTROPHILS NFR BLD AUTO: 93.2 % — HIGH (ref 43–77)
NEUTROPHILS NFR BLD AUTO: 93.7 % — HIGH (ref 43–77)
NEUTROPHILS NFR BLD AUTO: 95.7 % — HIGH (ref 43–77)
NEUTROPHILS NFR BLD AUTO: 98.2 % — HIGH (ref 43–77)
NEUTROPHILS NFR BLD AUTO: 98.2 % — HIGH (ref 43–77)
NITRITE UR-MCNC: NEGATIVE — SIGNIFICANT CHANGE UP
NITRITE UR-MCNC: NEGATIVE — SIGNIFICANT CHANGE UP
NON HDL CHOLESTEROL: 58 MG/DL — SIGNIFICANT CHANGE UP
NRBC # BLD: 0 /100 WBCS — SIGNIFICANT CHANGE UP (ref 0–0)
NT-PROBNP SERPL-SCNC: 8824 PG/ML — HIGH (ref 0–300)
NT-PROBNP SERPL-SCNC: HIGH PG/ML (ref 0–300)
OSMOLALITY UR: 648 MOSM/KG — SIGNIFICANT CHANGE UP (ref 300–900)
OVALOCYTES BLD QL SMEAR: SLIGHT — SIGNIFICANT CHANGE UP
PH UR: 6 — SIGNIFICANT CHANGE UP (ref 5–8)
PH UR: 6 — SIGNIFICANT CHANGE UP (ref 5–8)
PHOSPHATE SERPL-MCNC: 2.7 MG/DL — SIGNIFICANT CHANGE UP (ref 2.5–4.5)
PHOSPHATE SERPL-MCNC: 3.1 MG/DL — SIGNIFICANT CHANGE UP (ref 2.5–4.5)
PHOSPHATE SERPL-MCNC: 3.2 MG/DL — SIGNIFICANT CHANGE UP (ref 2.5–4.5)
PHOSPHATE SERPL-MCNC: 3.2 MG/DL — SIGNIFICANT CHANGE UP (ref 2.5–4.5)
PHOSPHATE SERPL-MCNC: 3.5 MG/DL — SIGNIFICANT CHANGE UP (ref 2.5–4.5)
PHOSPHATE SERPL-MCNC: 3.6 MG/DL — SIGNIFICANT CHANGE UP (ref 2.5–4.5)
PHOSPHATE SERPL-MCNC: 3.6 MG/DL — SIGNIFICANT CHANGE UP (ref 2.5–4.5)
PHOSPHATE SERPL-MCNC: 3.8 MG/DL — SIGNIFICANT CHANGE UP (ref 2.5–4.5)
PHOSPHATE SERPL-MCNC: 4.2 MG/DL — SIGNIFICANT CHANGE UP (ref 2.5–4.5)
PLAT MORPH BLD: ABNORMAL
PLAT MORPH BLD: NORMAL — SIGNIFICANT CHANGE UP
PLATELET # BLD AUTO: 107 K/UL — LOW (ref 150–400)
PLATELET # BLD AUTO: 112 K/UL — LOW (ref 150–400)
PLATELET # BLD AUTO: 112 K/UL — LOW (ref 150–400)
PLATELET # BLD AUTO: 125 K/UL — LOW (ref 150–400)
PLATELET # BLD AUTO: 160 K/UL — SIGNIFICANT CHANGE UP (ref 150–400)
PLATELET # BLD AUTO: 193 K/UL — SIGNIFICANT CHANGE UP (ref 150–400)
PLATELET # BLD AUTO: 216 K/UL — SIGNIFICANT CHANGE UP (ref 150–400)
PLATELET # BLD AUTO: 218 K/UL — SIGNIFICANT CHANGE UP (ref 150–400)
PLATELET # BLD AUTO: 236 K/UL — SIGNIFICANT CHANGE UP (ref 150–400)
PLATELET # BLD AUTO: 258 K/UL — SIGNIFICANT CHANGE UP (ref 150–400)
PLATELET # BLD AUTO: 277 K/UL — SIGNIFICANT CHANGE UP (ref 150–400)
PLATELET # BLD AUTO: 278 K/UL — SIGNIFICANT CHANGE UP (ref 150–400)
PLATELET # BLD AUTO: 284 K/UL — SIGNIFICANT CHANGE UP (ref 150–400)
PLATELET # BLD AUTO: 309 K/UL — SIGNIFICANT CHANGE UP (ref 150–400)
POIKILOCYTOSIS BLD QL AUTO: SIGNIFICANT CHANGE UP
POIKILOCYTOSIS BLD QL AUTO: SIGNIFICANT CHANGE UP
POIKILOCYTOSIS BLD QL AUTO: SLIGHT — SIGNIFICANT CHANGE UP
POLYCHROMASIA BLD QL SMEAR: SLIGHT — SIGNIFICANT CHANGE UP
POTASSIUM SERPL-MCNC: 3.9 MMOL/L — SIGNIFICANT CHANGE UP (ref 3.5–5.3)
POTASSIUM SERPL-MCNC: 4.1 MMOL/L — SIGNIFICANT CHANGE UP (ref 3.5–5.3)
POTASSIUM SERPL-MCNC: 4.1 MMOL/L — SIGNIFICANT CHANGE UP (ref 3.5–5.3)
POTASSIUM SERPL-MCNC: 4.2 MMOL/L — SIGNIFICANT CHANGE UP (ref 3.5–5.3)
POTASSIUM SERPL-MCNC: 4.2 MMOL/L — SIGNIFICANT CHANGE UP (ref 3.5–5.3)
POTASSIUM SERPL-MCNC: 4.3 MMOL/L — SIGNIFICANT CHANGE UP (ref 3.5–5.3)
POTASSIUM SERPL-MCNC: 4.3 MMOL/L — SIGNIFICANT CHANGE UP (ref 3.5–5.3)
POTASSIUM SERPL-MCNC: 4.4 MMOL/L — SIGNIFICANT CHANGE UP (ref 3.5–5.3)
POTASSIUM SERPL-MCNC: 4.6 MMOL/L — SIGNIFICANT CHANGE UP (ref 3.5–5.3)
POTASSIUM SERPL-MCNC: 4.7 MMOL/L — SIGNIFICANT CHANGE UP (ref 3.5–5.3)
POTASSIUM SERPL-MCNC: 4.7 MMOL/L — SIGNIFICANT CHANGE UP (ref 3.5–5.3)
POTASSIUM SERPL-MCNC: 4.8 MMOL/L — SIGNIFICANT CHANGE UP (ref 3.5–5.3)
POTASSIUM SERPL-MCNC: 4.9 MMOL/L — SIGNIFICANT CHANGE UP (ref 3.5–5.3)
POTASSIUM SERPL-MCNC: 4.9 MMOL/L — SIGNIFICANT CHANGE UP (ref 3.5–5.3)
POTASSIUM SERPL-MCNC: 5 MMOL/L — SIGNIFICANT CHANGE UP (ref 3.5–5.3)
POTASSIUM SERPL-MCNC: 5.1 MMOL/L — SIGNIFICANT CHANGE UP (ref 3.5–5.3)
POTASSIUM SERPL-SCNC: 3.9 MMOL/L — SIGNIFICANT CHANGE UP (ref 3.5–5.3)
POTASSIUM SERPL-SCNC: 4.1 MMOL/L — SIGNIFICANT CHANGE UP (ref 3.5–5.3)
POTASSIUM SERPL-SCNC: 4.1 MMOL/L — SIGNIFICANT CHANGE UP (ref 3.5–5.3)
POTASSIUM SERPL-SCNC: 4.2 MMOL/L — SIGNIFICANT CHANGE UP (ref 3.5–5.3)
POTASSIUM SERPL-SCNC: 4.2 MMOL/L — SIGNIFICANT CHANGE UP (ref 3.5–5.3)
POTASSIUM SERPL-SCNC: 4.3 MMOL/L — SIGNIFICANT CHANGE UP (ref 3.5–5.3)
POTASSIUM SERPL-SCNC: 4.3 MMOL/L — SIGNIFICANT CHANGE UP (ref 3.5–5.3)
POTASSIUM SERPL-SCNC: 4.4 MMOL/L — SIGNIFICANT CHANGE UP (ref 3.5–5.3)
POTASSIUM SERPL-SCNC: 4.6 MMOL/L — SIGNIFICANT CHANGE UP (ref 3.5–5.3)
POTASSIUM SERPL-SCNC: 4.7 MMOL/L — SIGNIFICANT CHANGE UP (ref 3.5–5.3)
POTASSIUM SERPL-SCNC: 4.7 MMOL/L — SIGNIFICANT CHANGE UP (ref 3.5–5.3)
POTASSIUM SERPL-SCNC: 4.8 MMOL/L — SIGNIFICANT CHANGE UP (ref 3.5–5.3)
POTASSIUM SERPL-SCNC: 4.9 MMOL/L — SIGNIFICANT CHANGE UP (ref 3.5–5.3)
POTASSIUM SERPL-SCNC: 4.9 MMOL/L — SIGNIFICANT CHANGE UP (ref 3.5–5.3)
POTASSIUM SERPL-SCNC: 5 MMOL/L — SIGNIFICANT CHANGE UP (ref 3.5–5.3)
POTASSIUM SERPL-SCNC: 5.1 MMOL/L — SIGNIFICANT CHANGE UP (ref 3.5–5.3)
PROCALCITONIN SERPL-MCNC: 0.18 NG/ML — HIGH (ref 0.02–0.1)
PROCALCITONIN SERPL-MCNC: 1.12 NG/ML — HIGH (ref 0.02–0.1)
PROCALCITONIN SERPL-MCNC: 3.66 NG/ML — HIGH (ref 0.02–0.1)
PROT SERPL-MCNC: 5.4 G/DL — LOW (ref 6–8.3)
PROT SERPL-MCNC: 5.7 G/DL — LOW (ref 6–8.3)
PROT SERPL-MCNC: 6 G/DL — SIGNIFICANT CHANGE UP (ref 6–8.3)
PROT SERPL-MCNC: 6.1 G/DL — SIGNIFICANT CHANGE UP (ref 6–8.3)
PROT SERPL-MCNC: 6.4 G/DL — SIGNIFICANT CHANGE UP (ref 6–8.3)
PROT SERPL-MCNC: 6.5 G/DL — SIGNIFICANT CHANGE UP (ref 6–8.3)
PROT UR-MCNC: 100 MG/DL
PROT UR-MCNC: 100 MG/DL
PROTHROM AB SERPL-ACNC: 17.7 SEC — HIGH (ref 10.6–13.6)
RBC # BLD: 3.13 M/UL — LOW (ref 4.2–5.8)
RBC # BLD: 3.42 M/UL — LOW (ref 4.2–5.8)
RBC # BLD: 3.46 M/UL — LOW (ref 4.2–5.8)
RBC # BLD: 3.51 M/UL — LOW (ref 4.2–5.8)
RBC # BLD: 3.53 M/UL — LOW (ref 4.2–5.8)
RBC # BLD: 3.59 M/UL — LOW (ref 4.2–5.8)
RBC # BLD: 3.62 M/UL — LOW (ref 4.2–5.8)
RBC # BLD: 3.66 M/UL — LOW (ref 4.2–5.8)
RBC # BLD: 3.67 M/UL — LOW (ref 4.2–5.8)
RBC # BLD: 3.72 M/UL — LOW (ref 4.2–5.8)
RBC # BLD: 3.73 M/UL — LOW (ref 4.2–5.8)
RBC # BLD: 3.76 M/UL — LOW (ref 4.2–5.8)
RBC # BLD: 3.76 M/UL — LOW (ref 4.2–5.8)
RBC # BLD: 3.81 M/UL — LOW (ref 4.2–5.8)
RBC # FLD: 14 % — SIGNIFICANT CHANGE UP (ref 10.3–14.5)
RBC # FLD: 14 % — SIGNIFICANT CHANGE UP (ref 10.3–14.5)
RBC # FLD: 14.1 % — SIGNIFICANT CHANGE UP (ref 10.3–14.5)
RBC # FLD: 14.2 % — SIGNIFICANT CHANGE UP (ref 10.3–14.5)
RBC # FLD: 14.2 % — SIGNIFICANT CHANGE UP (ref 10.3–14.5)
RBC # FLD: 14.4 % — SIGNIFICANT CHANGE UP (ref 10.3–14.5)
RBC # FLD: 14.5 % — SIGNIFICANT CHANGE UP (ref 10.3–14.5)
RBC # FLD: 14.6 % — HIGH (ref 10.3–14.5)
RBC # FLD: 14.6 % — HIGH (ref 10.3–14.5)
RBC # FLD: 14.7 % — HIGH (ref 10.3–14.5)
RBC # FLD: 14.8 % — HIGH (ref 10.3–14.5)
RBC BLD AUTO: ABNORMAL
RBC CASTS # UR COMP ASSIST: < 5 /HPF — SIGNIFICANT CHANGE UP
RBC CASTS # UR COMP ASSIST: ABNORMAL /HPF
RH IG SCN BLD-IMP: POSITIVE — SIGNIFICANT CHANGE UP
RH IG SCN BLD-IMP: POSITIVE — SIGNIFICANT CHANGE UP
S AUREUS DNA NOSE QL NAA+PROBE: POSITIVE
SARS-COV-2 RNA SPEC QL NAA+PROBE: DETECTED
SARS-COV-2 RNA SPEC QL NAA+PROBE: POSITIVE
SCHISTOCYTES BLD QL AUTO: SLIGHT — SIGNIFICANT CHANGE UP
SODIUM SERPL-SCNC: 131 MMOL/L — LOW (ref 135–145)
SODIUM SERPL-SCNC: 134 MMOL/L — LOW (ref 135–145)
SODIUM SERPL-SCNC: 135 MMOL/L — SIGNIFICANT CHANGE UP (ref 135–145)
SODIUM SERPL-SCNC: 138 MMOL/L — SIGNIFICANT CHANGE UP (ref 135–145)
SODIUM SERPL-SCNC: 138 MMOL/L — SIGNIFICANT CHANGE UP (ref 135–145)
SODIUM SERPL-SCNC: 140 MMOL/L — SIGNIFICANT CHANGE UP (ref 135–145)
SODIUM SERPL-SCNC: 141 MMOL/L — SIGNIFICANT CHANGE UP (ref 135–145)
SODIUM SERPL-SCNC: 142 MMOL/L — SIGNIFICANT CHANGE UP (ref 135–145)
SODIUM SERPL-SCNC: 143 MMOL/L — SIGNIFICANT CHANGE UP (ref 135–145)
SODIUM SERPL-SCNC: 146 MMOL/L — HIGH (ref 135–145)
SODIUM UR-SCNC: <20 MMOL/L — SIGNIFICANT CHANGE UP
SP GR SPEC: >=1.03 — SIGNIFICANT CHANGE UP (ref 1–1.03)
SP GR SPEC: >=1.03 — SIGNIFICANT CHANGE UP (ref 1–1.03)
SPECIMEN SOURCE: SIGNIFICANT CHANGE UP
SPHEROCYTES BLD QL SMEAR: SLIGHT — SIGNIFICANT CHANGE UP
TIBC SERPL-MCNC: 170 UG/DL — LOW (ref 220–430)
TRIGL SERPL-MCNC: 64 MG/DL — SIGNIFICANT CHANGE UP
TROPONIN T SERPL-MCNC: 0.11 NG/ML — CRITICAL HIGH (ref 0–0.01)
TROPONIN T SERPL-MCNC: 0.11 NG/ML — CRITICAL HIGH (ref 0–0.01)
TROPONIN T SERPL-MCNC: 0.15 NG/ML — CRITICAL HIGH (ref 0–0.01)
TSH SERPL-MCNC: 0.93 UIU/ML — SIGNIFICANT CHANGE UP (ref 0.27–4.2)
UIBC SERPL-MCNC: 157 UG/DL — SIGNIFICANT CHANGE UP (ref 110–370)
UROBILINOGEN FLD QL: 0.2 E.U./DL — SIGNIFICANT CHANGE UP
UROBILINOGEN FLD QL: 0.2 E.U./DL — SIGNIFICANT CHANGE UP
UUN UR-MCNC: 1255 MG/DL — SIGNIFICANT CHANGE UP
VIT B12 SERPL-MCNC: >2000 PG/ML — HIGH (ref 232–1245)
WBC # BLD: 10.09 K/UL — SIGNIFICANT CHANGE UP (ref 3.8–10.5)
WBC # BLD: 11.64 K/UL — HIGH (ref 3.8–10.5)
WBC # BLD: 11.81 K/UL — HIGH (ref 3.8–10.5)
WBC # BLD: 12.01 K/UL — HIGH (ref 3.8–10.5)
WBC # BLD: 12.02 K/UL — HIGH (ref 3.8–10.5)
WBC # BLD: 13.54 K/UL — HIGH (ref 3.8–10.5)
WBC # BLD: 13.82 K/UL — HIGH (ref 3.8–10.5)
WBC # BLD: 22.53 K/UL — HIGH (ref 3.8–10.5)
WBC # BLD: 5.53 K/UL — SIGNIFICANT CHANGE UP (ref 3.8–10.5)
WBC # BLD: 7.28 K/UL — SIGNIFICANT CHANGE UP (ref 3.8–10.5)
WBC # BLD: 8.59 K/UL — SIGNIFICANT CHANGE UP (ref 3.8–10.5)
WBC # BLD: 9.27 K/UL — SIGNIFICANT CHANGE UP (ref 3.8–10.5)
WBC # BLD: 9.42 K/UL — SIGNIFICANT CHANGE UP (ref 3.8–10.5)
WBC # BLD: 9.56 K/UL — SIGNIFICANT CHANGE UP (ref 3.8–10.5)
WBC # FLD AUTO: 10.09 K/UL — SIGNIFICANT CHANGE UP (ref 3.8–10.5)
WBC # FLD AUTO: 11.64 K/UL — HIGH (ref 3.8–10.5)
WBC # FLD AUTO: 11.81 K/UL — HIGH (ref 3.8–10.5)
WBC # FLD AUTO: 12.01 K/UL — HIGH (ref 3.8–10.5)
WBC # FLD AUTO: 12.02 K/UL — HIGH (ref 3.8–10.5)
WBC # FLD AUTO: 13.54 K/UL — HIGH (ref 3.8–10.5)
WBC # FLD AUTO: 13.82 K/UL — HIGH (ref 3.8–10.5)
WBC # FLD AUTO: 22.53 K/UL — HIGH (ref 3.8–10.5)
WBC # FLD AUTO: 5.53 K/UL — SIGNIFICANT CHANGE UP (ref 3.8–10.5)
WBC # FLD AUTO: 7.28 K/UL — SIGNIFICANT CHANGE UP (ref 3.8–10.5)
WBC # FLD AUTO: 8.59 K/UL — SIGNIFICANT CHANGE UP (ref 3.8–10.5)
WBC # FLD AUTO: 9.27 K/UL — SIGNIFICANT CHANGE UP (ref 3.8–10.5)
WBC # FLD AUTO: 9.42 K/UL — SIGNIFICANT CHANGE UP (ref 3.8–10.5)
WBC # FLD AUTO: 9.56 K/UL — SIGNIFICANT CHANGE UP (ref 3.8–10.5)
WBC UR QL: < 5 /HPF — SIGNIFICANT CHANGE UP
WBC UR QL: < 5 /HPF — SIGNIFICANT CHANGE UP

## 2022-01-01 PROCEDURE — 99233 SBSQ HOSP IP/OBS HIGH 50: CPT

## 2022-01-01 PROCEDURE — 80053 COMPREHEN METABOLIC PANEL: CPT

## 2022-01-01 PROCEDURE — 99232 SBSQ HOSP IP/OBS MODERATE 35: CPT

## 2022-01-01 PROCEDURE — 83036 HEMOGLOBIN GLYCOSYLATED A1C: CPT

## 2022-01-01 PROCEDURE — 85379 FIBRIN DEGRADATION QUANT: CPT

## 2022-01-01 PROCEDURE — 97530 THERAPEUTIC ACTIVITIES: CPT

## 2022-01-01 PROCEDURE — 82746 ASSAY OF FOLIC ACID SERUM: CPT

## 2022-01-01 PROCEDURE — U0005: CPT

## 2022-01-01 PROCEDURE — 70450 CT HEAD/BRAIN W/O DYE: CPT | Mod: 26,MA

## 2022-01-01 PROCEDURE — 99233 SBSQ HOSP IP/OBS HIGH 50: CPT | Mod: GC

## 2022-01-01 PROCEDURE — 82550 ASSAY OF CK (CPK): CPT

## 2022-01-01 PROCEDURE — 86901 BLOOD TYPING SEROLOGIC RH(D): CPT

## 2022-01-01 PROCEDURE — U0003: CPT

## 2022-01-01 PROCEDURE — 70450 CT HEAD/BRAIN W/O DYE: CPT | Mod: MA

## 2022-01-01 PROCEDURE — 80048 BASIC METABOLIC PNL TOTAL CA: CPT

## 2022-01-01 PROCEDURE — 86850 RBC ANTIBODY SCREEN: CPT

## 2022-01-01 PROCEDURE — 92610 EVALUATE SWALLOWING FUNCTION: CPT

## 2022-01-01 PROCEDURE — 97161 PT EVAL LOW COMPLEX 20 MIN: CPT

## 2022-01-01 PROCEDURE — 93010 ELECTROCARDIOGRAM REPORT: CPT

## 2022-01-01 PROCEDURE — 99223 1ST HOSP IP/OBS HIGH 75: CPT

## 2022-01-01 PROCEDURE — 72125 CT NECK SPINE W/O DYE: CPT | Mod: MA

## 2022-01-01 PROCEDURE — 93880 EXTRACRANIAL BILAT STUDY: CPT | Mod: 26

## 2022-01-01 PROCEDURE — 85652 RBC SED RATE AUTOMATED: CPT

## 2022-01-01 PROCEDURE — 73562 X-RAY EXAM OF KNEE 3: CPT | Mod: 26,LT

## 2022-01-01 PROCEDURE — 86900 BLOOD TYPING SEROLOGIC ABO: CPT

## 2022-01-01 PROCEDURE — 84295 ASSAY OF SERUM SODIUM: CPT

## 2022-01-01 PROCEDURE — 84540 ASSAY OF URINE/UREA-N: CPT

## 2022-01-01 PROCEDURE — 81001 URINALYSIS AUTO W/SCOPE: CPT

## 2022-01-01 PROCEDURE — 85730 THROMBOPLASTIN TIME PARTIAL: CPT

## 2022-01-01 PROCEDURE — 83550 IRON BINDING TEST: CPT

## 2022-01-01 PROCEDURE — 99222 1ST HOSP IP/OBS MODERATE 55: CPT

## 2022-01-01 PROCEDURE — 93005 ELECTROCARDIOGRAM TRACING: CPT

## 2022-01-01 PROCEDURE — 80076 HEPATIC FUNCTION PANEL: CPT

## 2022-01-01 PROCEDURE — 84145 PROCALCITONIN (PCT): CPT

## 2022-01-01 PROCEDURE — 99497 ADVNCD CARE PLAN 30 MIN: CPT | Mod: 25

## 2022-01-01 PROCEDURE — 93970 EXTREMITY STUDY: CPT | Mod: 26

## 2022-01-01 PROCEDURE — 95700 EEG CONT REC W/VID EEG TECH: CPT

## 2022-01-01 PROCEDURE — 84443 ASSAY THYROID STIM HORMONE: CPT

## 2022-01-01 PROCEDURE — 71045 X-RAY EXAM CHEST 1 VIEW: CPT | Mod: 26

## 2022-01-01 PROCEDURE — 97116 GAIT TRAINING THERAPY: CPT

## 2022-01-01 PROCEDURE — 97110 THERAPEUTIC EXERCISES: CPT

## 2022-01-01 PROCEDURE — 85610 PROTHROMBIN TIME: CPT

## 2022-01-01 PROCEDURE — 72170 X-RAY EXAM OF PELVIS: CPT | Mod: 26

## 2022-01-01 PROCEDURE — 93880 EXTRACRANIAL BILAT STUDY: CPT

## 2022-01-01 PROCEDURE — 93308 TTE F-UP OR LMTD: CPT | Mod: 26

## 2022-01-01 PROCEDURE — 82570 ASSAY OF URINE CREATININE: CPT

## 2022-01-01 PROCEDURE — 99358 PROLONG SERVICE W/O CONTACT: CPT

## 2022-01-01 PROCEDURE — 95720 EEG PHY/QHP EA INCR W/VEEG: CPT

## 2022-01-01 PROCEDURE — 99285 EMERGENCY DEPT VISIT HI MDM: CPT | Mod: 25

## 2022-01-01 PROCEDURE — 87635 SARS-COV-2 COVID-19 AMP PRB: CPT

## 2022-01-01 PROCEDURE — 82330 ASSAY OF CALCIUM: CPT

## 2022-01-01 PROCEDURE — 87040 BLOOD CULTURE FOR BACTERIA: CPT

## 2022-01-01 PROCEDURE — 83615 LACTATE (LD) (LDH) ENZYME: CPT

## 2022-01-01 PROCEDURE — 72170 X-RAY EXAM OF PELVIS: CPT

## 2022-01-01 PROCEDURE — 93321 DOPPLER ECHO F-UP/LMTD STD: CPT

## 2022-01-01 PROCEDURE — 83735 ASSAY OF MAGNESIUM: CPT

## 2022-01-01 PROCEDURE — 72125 CT NECK SPINE W/O DYE: CPT | Mod: 26,MA

## 2022-01-01 PROCEDURE — 93970 EXTREMITY STUDY: CPT

## 2022-01-01 PROCEDURE — 87640 STAPH A DNA AMP PROBE: CPT

## 2022-01-01 PROCEDURE — 84100 ASSAY OF PHOSPHORUS: CPT

## 2022-01-01 PROCEDURE — 83935 ASSAY OF URINE OSMOLALITY: CPT

## 2022-01-01 PROCEDURE — 84484 ASSAY OF TROPONIN QUANT: CPT

## 2022-01-01 PROCEDURE — 84300 ASSAY OF URINE SODIUM: CPT

## 2022-01-01 PROCEDURE — 95714 VEEG EA 12-26 HR UNMNTR: CPT

## 2022-01-01 PROCEDURE — 86140 C-REACTIVE PROTEIN: CPT

## 2022-01-01 PROCEDURE — 83540 ASSAY OF IRON: CPT

## 2022-01-01 PROCEDURE — 95718 EEG PHYS/QHP 2-12 HR W/VEEG: CPT

## 2022-01-01 PROCEDURE — 82728 ASSAY OF FERRITIN: CPT

## 2022-01-01 PROCEDURE — 84132 ASSAY OF SERUM POTASSIUM: CPT

## 2022-01-01 PROCEDURE — 85027 COMPLETE CBC AUTOMATED: CPT

## 2022-01-01 PROCEDURE — 99291 CRITICAL CARE FIRST HOUR: CPT

## 2022-01-01 PROCEDURE — 36415 COLL VENOUS BLD VENIPUNCTURE: CPT

## 2022-01-01 PROCEDURE — 82803 BLOOD GASES ANY COMBINATION: CPT

## 2022-01-01 PROCEDURE — 82607 VITAMIN B-12: CPT

## 2022-01-01 PROCEDURE — 83880 ASSAY OF NATRIURETIC PEPTIDE: CPT

## 2022-01-01 PROCEDURE — 82962 GLUCOSE BLOOD TEST: CPT

## 2022-01-01 PROCEDURE — 99285 EMERGENCY DEPT VISIT HI MDM: CPT

## 2022-01-01 PROCEDURE — 73562 X-RAY EXAM OF KNEE 3: CPT

## 2022-01-01 PROCEDURE — 99498 ADVNCD CARE PLAN ADDL 30 MIN: CPT | Mod: 25

## 2022-01-01 PROCEDURE — 87641 MR-STAPH DNA AMP PROBE: CPT

## 2022-01-01 PROCEDURE — 82553 CREATINE MB FRACTION: CPT

## 2022-01-01 PROCEDURE — 71045 X-RAY EXAM CHEST 1 VIEW: CPT

## 2022-01-01 PROCEDURE — 80061 LIPID PANEL: CPT

## 2022-01-01 PROCEDURE — 85025 COMPLETE CBC W/AUTO DIFF WBC: CPT

## 2022-01-01 RX ORDER — QUETIAPINE FUMARATE 200 MG/1
12.5 TABLET, FILM COATED ORAL AT BEDTIME
Refills: 0 | Status: DISCONTINUED | OUTPATIENT
Start: 2022-01-01 | End: 2022-01-01

## 2022-01-01 RX ORDER — CLOPIDOGREL BISULFATE 75 MG/1
600 TABLET, FILM COATED ORAL ONCE
Refills: 0 | Status: DISCONTINUED | OUTPATIENT
Start: 2022-01-01 | End: 2022-01-01

## 2022-01-01 RX ORDER — REMDESIVIR 5 MG/ML
INJECTION INTRAVENOUS
Refills: 0 | Status: COMPLETED | OUTPATIENT
Start: 2022-01-01 | End: 2022-01-01

## 2022-01-01 RX ORDER — ASPIRIN/CALCIUM CARB/MAGNESIUM 324 MG
1 TABLET ORAL
Qty: 0 | Refills: 0 | DISCHARGE

## 2022-01-01 RX ORDER — AMLODIPINE BESYLATE 2.5 MG/1
10 TABLET ORAL EVERY 24 HOURS
Refills: 0 | Status: DISCONTINUED | OUTPATIENT
Start: 2022-01-01 | End: 2022-01-01

## 2022-01-01 RX ORDER — POLYETHYLENE GLYCOL 3350 17 G/17G
17 POWDER, FOR SOLUTION ORAL EVERY 12 HOURS
Refills: 0 | Status: DISCONTINUED | OUTPATIENT
Start: 2022-01-01 | End: 2022-01-01

## 2022-01-01 RX ORDER — ROSUVASTATIN CALCIUM 5 MG/1
1 TABLET ORAL
Qty: 0 | Refills: 0 | DISCHARGE

## 2022-01-01 RX ORDER — ROBINUL 0.2 MG/ML
0.2 INJECTION INTRAMUSCULAR; INTRAVENOUS EVERY 4 HOURS
Refills: 0 | Status: DISCONTINUED | OUTPATIENT
Start: 2022-01-01 | End: 2022-01-01

## 2022-01-01 RX ORDER — SODIUM CHLORIDE 9 MG/ML
1000 INJECTION INTRAMUSCULAR; INTRAVENOUS; SUBCUTANEOUS
Refills: 0 | Status: DISCONTINUED | OUTPATIENT
Start: 2022-01-01 | End: 2022-01-01

## 2022-01-01 RX ORDER — SENNA PLUS 8.6 MG/1
2 TABLET ORAL AT BEDTIME
Refills: 0 | Status: DISCONTINUED | OUTPATIENT
Start: 2022-01-01 | End: 2022-01-01

## 2022-01-01 RX ORDER — HYDROMORPHONE HYDROCHLORIDE 2 MG/ML
0.2 INJECTION INTRAMUSCULAR; INTRAVENOUS; SUBCUTANEOUS EVERY 4 HOURS
Refills: 0 | Status: DISCONTINUED | OUTPATIENT
Start: 2022-01-01 | End: 2022-01-01

## 2022-01-01 RX ORDER — HALOPERIDOL DECANOATE 100 MG/ML
2 INJECTION INTRAMUSCULAR ONCE
Refills: 0 | Status: COMPLETED | OUTPATIENT
Start: 2022-01-01 | End: 2022-01-01

## 2022-01-01 RX ORDER — ENOXAPARIN SODIUM 100 MG/ML
40 INJECTION SUBCUTANEOUS AT BEDTIME
Refills: 0 | Status: DISCONTINUED | OUTPATIENT
Start: 2022-01-01 | End: 2022-01-01

## 2022-01-01 RX ORDER — PIPERACILLIN AND TAZOBACTAM 4; .5 G/20ML; G/20ML
4.5 INJECTION, POWDER, LYOPHILIZED, FOR SOLUTION INTRAVENOUS EVERY 6 HOURS
Refills: 0 | Status: DISCONTINUED | OUTPATIENT
Start: 2022-01-01 | End: 2022-01-01

## 2022-01-01 RX ORDER — AMLODIPINE BESYLATE 2.5 MG/1
5 TABLET ORAL DAILY
Refills: 0 | Status: DISCONTINUED | OUTPATIENT
Start: 2022-01-01 | End: 2022-01-01

## 2022-01-01 RX ORDER — CLOPIDOGREL BISULFATE 75 MG/1
75 TABLET, FILM COATED ORAL DAILY
Refills: 0 | Status: DISCONTINUED | OUTPATIENT
Start: 2022-01-01 | End: 2022-01-01

## 2022-01-01 RX ORDER — SODIUM CHLORIDE 9 MG/ML
500 INJECTION, SOLUTION INTRAVENOUS ONCE
Refills: 0 | Status: COMPLETED | OUTPATIENT
Start: 2022-01-01 | End: 2022-01-01

## 2022-01-01 RX ORDER — TAMSULOSIN HYDROCHLORIDE 0.4 MG/1
0.4 CAPSULE ORAL AT BEDTIME
Refills: 0 | Status: DISCONTINUED | OUTPATIENT
Start: 2022-01-01 | End: 2022-01-01

## 2022-01-01 RX ORDER — QUETIAPINE FUMARATE 200 MG/1
25 TABLET, FILM COATED ORAL AT BEDTIME
Refills: 0 | Status: DISCONTINUED | OUTPATIENT
Start: 2022-01-01 | End: 2022-01-01

## 2022-01-01 RX ORDER — SODIUM CHLORIDE 9 MG/ML
1000 INJECTION, SOLUTION INTRAVENOUS
Refills: 0 | Status: DISCONTINUED | OUTPATIENT
Start: 2022-01-01 | End: 2022-01-01

## 2022-01-01 RX ORDER — ACETAMINOPHEN 500 MG
650 TABLET ORAL EVERY 6 HOURS
Refills: 0 | Status: DISCONTINUED | OUTPATIENT
Start: 2022-01-01 | End: 2022-01-01

## 2022-01-01 RX ORDER — OLANZAPINE 15 MG/1
2.5 TABLET, FILM COATED ORAL ONCE
Refills: 0 | Status: COMPLETED | OUTPATIENT
Start: 2022-01-01 | End: 2022-01-01

## 2022-01-01 RX ORDER — DEXAMETHASONE 0.5 MG/5ML
6 ELIXIR ORAL EVERY 24 HOURS
Refills: 0 | Status: DISCONTINUED | OUTPATIENT
Start: 2022-01-01 | End: 2022-01-01

## 2022-01-01 RX ORDER — HALOPERIDOL DECANOATE 100 MG/ML
1 INJECTION INTRAMUSCULAR ONCE
Refills: 0 | Status: COMPLETED | OUTPATIENT
Start: 2022-01-01 | End: 2022-01-01

## 2022-01-01 RX ORDER — INSULIN LISPRO 100/ML
VIAL (ML) SUBCUTANEOUS
Refills: 0 | Status: DISCONTINUED | OUTPATIENT
Start: 2022-01-01 | End: 2022-01-01

## 2022-01-01 RX ORDER — CLOPIDOGREL BISULFATE 75 MG/1
1 TABLET, FILM COATED ORAL
Qty: 0 | Refills: 0 | DISCHARGE

## 2022-01-01 RX ORDER — TOCILIZUMAB 20 MG/ML
500 INJECTION, SOLUTION, CONCENTRATE INTRAVENOUS ONCE
Refills: 0 | Status: COMPLETED | OUTPATIENT
Start: 2022-01-01 | End: 2022-01-01

## 2022-01-01 RX ORDER — VANCOMYCIN HCL 1 G
1000 VIAL (EA) INTRAVENOUS ONCE
Refills: 0 | Status: DISCONTINUED | OUTPATIENT
Start: 2022-01-01 | End: 2022-01-01

## 2022-01-01 RX ORDER — REMDESIVIR 5 MG/ML
200 INJECTION INTRAVENOUS EVERY 24 HOURS
Refills: 0 | Status: COMPLETED | OUTPATIENT
Start: 2022-01-01 | End: 2022-01-01

## 2022-01-01 RX ORDER — LANOLIN ALCOHOL/MO/W.PET/CERES
3 CREAM (GRAM) TOPICAL ONCE
Refills: 0 | Status: COMPLETED | OUTPATIENT
Start: 2022-01-01 | End: 2022-01-01

## 2022-01-01 RX ORDER — OLANZAPINE 15 MG/1
2.5 TABLET, FILM COATED ORAL DAILY
Refills: 0 | Status: DISCONTINUED | OUTPATIENT
Start: 2022-01-01 | End: 2022-01-01

## 2022-01-01 RX ORDER — PANTOPRAZOLE SODIUM 20 MG/1
40 TABLET, DELAYED RELEASE ORAL ONCE
Refills: 0 | Status: COMPLETED | OUTPATIENT
Start: 2022-01-01 | End: 2022-01-01

## 2022-01-01 RX ORDER — PANTOPRAZOLE SODIUM 20 MG/1
40 TABLET, DELAYED RELEASE ORAL
Refills: 0 | Status: COMPLETED | OUTPATIENT
Start: 2022-01-01 | End: 2022-01-01

## 2022-01-01 RX ORDER — OLANZAPINE 15 MG/1
2.5 TABLET, FILM COATED ORAL EVERY 8 HOURS
Refills: 0 | Status: DISCONTINUED | OUTPATIENT
Start: 2022-01-01 | End: 2022-01-01

## 2022-01-01 RX ORDER — REMDESIVIR 5 MG/ML
100 INJECTION INTRAVENOUS EVERY 24 HOURS
Refills: 0 | Status: COMPLETED | OUTPATIENT
Start: 2022-01-01 | End: 2022-01-01

## 2022-01-01 RX ORDER — ATORVASTATIN CALCIUM 80 MG/1
20 TABLET, FILM COATED ORAL AT BEDTIME
Refills: 0 | Status: DISCONTINUED | OUTPATIENT
Start: 2022-01-01 | End: 2022-01-01

## 2022-01-01 RX ORDER — APIXABAN 2.5 MG/1
5 TABLET, FILM COATED ORAL
Refills: 0 | Status: DISCONTINUED | OUTPATIENT
Start: 2022-01-01 | End: 2022-01-01

## 2022-01-01 RX ORDER — ASPIRIN/CALCIUM CARB/MAGNESIUM 324 MG
81 TABLET ORAL DAILY
Refills: 0 | Status: DISCONTINUED | OUTPATIENT
Start: 2022-01-01 | End: 2022-01-01

## 2022-01-01 RX ORDER — TAMSULOSIN HYDROCHLORIDE 0.4 MG/1
1 CAPSULE ORAL
Qty: 0 | Refills: 0 | DISCHARGE

## 2022-01-01 RX ADMIN — Medication 650 MILLIGRAM(S): at 09:51

## 2022-01-01 RX ADMIN — PANTOPRAZOLE SODIUM 40 MILLIGRAM(S): 20 TABLET, DELAYED RELEASE ORAL at 07:15

## 2022-01-01 RX ADMIN — CLOPIDOGREL BISULFATE 75 MILLIGRAM(S): 75 TABLET, FILM COATED ORAL at 12:46

## 2022-01-01 RX ADMIN — Medication 650 MILLIGRAM(S): at 14:05

## 2022-01-01 RX ADMIN — Medication 6 MILLIGRAM(S): at 11:31

## 2022-01-01 RX ADMIN — TOCILIZUMAB 100 MILLIGRAM(S): 20 INJECTION, SOLUTION, CONCENTRATE INTRAVENOUS at 14:08

## 2022-01-01 RX ADMIN — TAMSULOSIN HYDROCHLORIDE 0.4 MILLIGRAM(S): 0.4 CAPSULE ORAL at 22:48

## 2022-01-01 RX ADMIN — Medication 100 MILLIGRAM(S): at 21:51

## 2022-01-01 RX ADMIN — POLYETHYLENE GLYCOL 3350 17 GRAM(S): 17 POWDER, FOR SOLUTION ORAL at 13:06

## 2022-01-01 RX ADMIN — APIXABAN 5 MILLIGRAM(S): 2.5 TABLET, FILM COATED ORAL at 05:36

## 2022-01-01 RX ADMIN — QUETIAPINE FUMARATE 25 MILLIGRAM(S): 200 TABLET, FILM COATED ORAL at 21:28

## 2022-01-01 RX ADMIN — SODIUM CHLORIDE 75 MILLILITER(S): 9 INJECTION, SOLUTION INTRAVENOUS at 06:53

## 2022-01-01 RX ADMIN — TAMSULOSIN HYDROCHLORIDE 0.4 MILLIGRAM(S): 0.4 CAPSULE ORAL at 21:05

## 2022-01-01 RX ADMIN — SENNA PLUS 2 TABLET(S): 8.6 TABLET ORAL at 22:17

## 2022-01-01 RX ADMIN — REMDESIVIR 500 MILLIGRAM(S): 5 INJECTION INTRAVENOUS at 11:36

## 2022-01-01 RX ADMIN — CLOPIDOGREL BISULFATE 75 MILLIGRAM(S): 75 TABLET, FILM COATED ORAL at 11:21

## 2022-01-01 RX ADMIN — APIXABAN 5 MILLIGRAM(S): 2.5 TABLET, FILM COATED ORAL at 18:38

## 2022-01-01 RX ADMIN — ATORVASTATIN CALCIUM 20 MILLIGRAM(S): 80 TABLET, FILM COATED ORAL at 21:51

## 2022-01-01 RX ADMIN — Medication 6 MILLIGRAM(S): at 10:16

## 2022-01-01 RX ADMIN — OLANZAPINE 2.5 MILLIGRAM(S): 15 TABLET, FILM COATED ORAL at 11:58

## 2022-01-01 RX ADMIN — SENNA PLUS 2 TABLET(S): 8.6 TABLET ORAL at 21:56

## 2022-01-01 RX ADMIN — SODIUM CHLORIDE 150 MILLILITER(S): 9 INJECTION INTRAMUSCULAR; INTRAVENOUS; SUBCUTANEOUS at 15:33

## 2022-01-01 RX ADMIN — Medication 60 MILLIGRAM(S): at 06:02

## 2022-01-01 RX ADMIN — POLYETHYLENE GLYCOL 3350 17 GRAM(S): 17 POWDER, FOR SOLUTION ORAL at 12:29

## 2022-01-01 RX ADMIN — CLOPIDOGREL BISULFATE 75 MILLIGRAM(S): 75 TABLET, FILM COATED ORAL at 12:01

## 2022-01-01 RX ADMIN — AMLODIPINE BESYLATE 10 MILLIGRAM(S): 2.5 TABLET ORAL at 05:11

## 2022-01-01 RX ADMIN — OLANZAPINE 2.5 MILLIGRAM(S): 15 TABLET, FILM COATED ORAL at 13:26

## 2022-01-01 RX ADMIN — Medication 2: at 22:47

## 2022-01-01 RX ADMIN — CLOPIDOGREL BISULFATE 75 MILLIGRAM(S): 75 TABLET, FILM COATED ORAL at 13:19

## 2022-01-01 RX ADMIN — Medication 650 MILLIGRAM(S): at 22:50

## 2022-01-01 RX ADMIN — Medication 2: at 22:49

## 2022-01-01 RX ADMIN — Medication 2: at 18:07

## 2022-01-01 RX ADMIN — QUETIAPINE FUMARATE 12.5 MILLIGRAM(S): 200 TABLET, FILM COATED ORAL at 21:41

## 2022-01-01 RX ADMIN — CLOPIDOGREL BISULFATE 75 MILLIGRAM(S): 75 TABLET, FILM COATED ORAL at 13:26

## 2022-01-01 RX ADMIN — SENNA PLUS 2 TABLET(S): 8.6 TABLET ORAL at 21:23

## 2022-01-01 RX ADMIN — ENOXAPARIN SODIUM 40 MILLIGRAM(S): 100 INJECTION SUBCUTANEOUS at 21:22

## 2022-01-01 RX ADMIN — APIXABAN 5 MILLIGRAM(S): 2.5 TABLET, FILM COATED ORAL at 18:54

## 2022-01-01 RX ADMIN — REMDESIVIR 500 MILLIGRAM(S): 5 INJECTION INTRAVENOUS at 10:43

## 2022-01-01 RX ADMIN — Medication 6 MILLIGRAM(S): at 10:01

## 2022-01-01 RX ADMIN — PANTOPRAZOLE SODIUM 40 MILLIGRAM(S): 20 TABLET, DELAYED RELEASE ORAL at 05:11

## 2022-01-01 RX ADMIN — AMLODIPINE BESYLATE 5 MILLIGRAM(S): 2.5 TABLET ORAL at 21:43

## 2022-01-01 RX ADMIN — APIXABAN 5 MILLIGRAM(S): 2.5 TABLET, FILM COATED ORAL at 17:07

## 2022-01-01 RX ADMIN — TAMSULOSIN HYDROCHLORIDE 0.4 MILLIGRAM(S): 0.4 CAPSULE ORAL at 21:57

## 2022-01-01 RX ADMIN — TAMSULOSIN HYDROCHLORIDE 0.4 MILLIGRAM(S): 0.4 CAPSULE ORAL at 21:50

## 2022-01-01 RX ADMIN — ENOXAPARIN SODIUM 40 MILLIGRAM(S): 100 INJECTION SUBCUTANEOUS at 22:17

## 2022-01-01 RX ADMIN — TAMSULOSIN HYDROCHLORIDE 0.4 MILLIGRAM(S): 0.4 CAPSULE ORAL at 21:41

## 2022-01-01 RX ADMIN — Medication 4: at 13:05

## 2022-01-01 RX ADMIN — AMLODIPINE BESYLATE 10 MILLIGRAM(S): 2.5 TABLET ORAL at 06:02

## 2022-01-01 RX ADMIN — PIPERACILLIN AND TAZOBACTAM 200 GRAM(S): 4; .5 INJECTION, POWDER, LYOPHILIZED, FOR SOLUTION INTRAVENOUS at 12:01

## 2022-01-01 RX ADMIN — AMLODIPINE BESYLATE 5 MILLIGRAM(S): 2.5 TABLET ORAL at 05:41

## 2022-01-01 RX ADMIN — SODIUM CHLORIDE 75 MILLILITER(S): 9 INJECTION, SOLUTION INTRAVENOUS at 12:29

## 2022-01-01 RX ADMIN — Medication 6 MILLIGRAM(S): at 12:28

## 2022-01-01 RX ADMIN — SODIUM CHLORIDE 75 MILLILITER(S): 9 INJECTION, SOLUTION INTRAVENOUS at 22:17

## 2022-01-01 RX ADMIN — TAMSULOSIN HYDROCHLORIDE 0.4 MILLIGRAM(S): 0.4 CAPSULE ORAL at 21:23

## 2022-01-01 RX ADMIN — CLOPIDOGREL BISULFATE 75 MILLIGRAM(S): 75 TABLET, FILM COATED ORAL at 19:31

## 2022-01-01 RX ADMIN — SENNA PLUS 2 TABLET(S): 8.6 TABLET ORAL at 21:29

## 2022-01-01 RX ADMIN — HALOPERIDOL DECANOATE 2 MILLIGRAM(S): 100 INJECTION INTRAMUSCULAR at 16:43

## 2022-01-01 RX ADMIN — APIXABAN 5 MILLIGRAM(S): 2.5 TABLET, FILM COATED ORAL at 18:08

## 2022-01-01 RX ADMIN — CLOPIDOGREL BISULFATE 75 MILLIGRAM(S): 75 TABLET, FILM COATED ORAL at 11:52

## 2022-01-01 RX ADMIN — Medication 6 MILLIGRAM(S): at 11:36

## 2022-01-01 RX ADMIN — CLOPIDOGREL BISULFATE 75 MILLIGRAM(S): 75 TABLET, FILM COATED ORAL at 12:28

## 2022-01-01 RX ADMIN — REMDESIVIR 500 MILLIGRAM(S): 5 INJECTION INTRAVENOUS at 10:39

## 2022-01-01 RX ADMIN — PIPERACILLIN AND TAZOBACTAM 200 GRAM(S): 4; .5 INJECTION, POWDER, LYOPHILIZED, FOR SOLUTION INTRAVENOUS at 23:08

## 2022-01-01 RX ADMIN — Medication 2: at 13:00

## 2022-01-01 RX ADMIN — Medication 100 MILLIGRAM(S): at 22:19

## 2022-01-01 RX ADMIN — POLYETHYLENE GLYCOL 3350 17 GRAM(S): 17 POWDER, FOR SOLUTION ORAL at 13:20

## 2022-01-01 RX ADMIN — Medication 650 MILLIGRAM(S): at 16:17

## 2022-01-01 RX ADMIN — HALOPERIDOL DECANOATE 1 MILLIGRAM(S): 100 INJECTION INTRAMUSCULAR at 15:26

## 2022-01-01 RX ADMIN — Medication 10 MILLIGRAM(S): at 05:56

## 2022-01-01 RX ADMIN — AMLODIPINE BESYLATE 5 MILLIGRAM(S): 2.5 TABLET ORAL at 04:04

## 2022-01-01 RX ADMIN — Medication 2: at 22:30

## 2022-01-01 RX ADMIN — APIXABAN 5 MILLIGRAM(S): 2.5 TABLET, FILM COATED ORAL at 06:45

## 2022-01-01 RX ADMIN — AMLODIPINE BESYLATE 5 MILLIGRAM(S): 2.5 TABLET ORAL at 05:35

## 2022-01-01 RX ADMIN — TAMSULOSIN HYDROCHLORIDE 0.4 MILLIGRAM(S): 0.4 CAPSULE ORAL at 22:17

## 2022-01-01 RX ADMIN — SENNA PLUS 2 TABLET(S): 8.6 TABLET ORAL at 22:19

## 2022-01-01 RX ADMIN — TAMSULOSIN HYDROCHLORIDE 0.4 MILLIGRAM(S): 0.4 CAPSULE ORAL at 21:28

## 2022-01-01 RX ADMIN — PIPERACILLIN AND TAZOBACTAM 200 GRAM(S): 4; .5 INJECTION, POWDER, LYOPHILIZED, FOR SOLUTION INTRAVENOUS at 05:11

## 2022-01-01 RX ADMIN — POLYETHYLENE GLYCOL 3350 17 GRAM(S): 17 POWDER, FOR SOLUTION ORAL at 13:08

## 2022-01-01 RX ADMIN — Medication 2: at 21:42

## 2022-01-01 RX ADMIN — AMLODIPINE BESYLATE 5 MILLIGRAM(S): 2.5 TABLET ORAL at 05:37

## 2022-01-01 RX ADMIN — REMDESIVIR 500 MILLIGRAM(S): 5 INJECTION INTRAVENOUS at 11:32

## 2022-01-01 RX ADMIN — APIXABAN 5 MILLIGRAM(S): 2.5 TABLET, FILM COATED ORAL at 05:56

## 2022-01-01 RX ADMIN — Medication 3 MILLIGRAM(S): at 23:35

## 2022-01-01 RX ADMIN — APIXABAN 5 MILLIGRAM(S): 2.5 TABLET, FILM COATED ORAL at 06:14

## 2022-01-01 RX ADMIN — Medication 650 MILLIGRAM(S): at 05:44

## 2022-01-01 RX ADMIN — ENOXAPARIN SODIUM 40 MILLIGRAM(S): 100 INJECTION SUBCUTANEOUS at 21:29

## 2022-01-01 RX ADMIN — PANTOPRAZOLE SODIUM 40 MILLIGRAM(S): 20 TABLET, DELAYED RELEASE ORAL at 06:02

## 2022-01-01 RX ADMIN — OLANZAPINE 2.5 MILLIGRAM(S): 15 TABLET, FILM COATED ORAL at 06:53

## 2022-01-01 RX ADMIN — APIXABAN 5 MILLIGRAM(S): 2.5 TABLET, FILM COATED ORAL at 17:42

## 2022-01-01 RX ADMIN — APIXABAN 5 MILLIGRAM(S): 2.5 TABLET, FILM COATED ORAL at 05:53

## 2022-01-01 RX ADMIN — SENNA PLUS 2 TABLET(S): 8.6 TABLET ORAL at 22:13

## 2022-01-01 RX ADMIN — TAMSULOSIN HYDROCHLORIDE 0.4 MILLIGRAM(S): 0.4 CAPSULE ORAL at 22:13

## 2022-01-01 RX ADMIN — HALOPERIDOL DECANOATE 1 MILLIGRAM(S): 100 INJECTION INTRAMUSCULAR at 01:41

## 2022-01-01 RX ADMIN — CLOPIDOGREL BISULFATE 75 MILLIGRAM(S): 75 TABLET, FILM COATED ORAL at 12:29

## 2022-01-01 RX ADMIN — TAMSULOSIN HYDROCHLORIDE 0.4 MILLIGRAM(S): 0.4 CAPSULE ORAL at 21:27

## 2022-01-01 RX ADMIN — TAMSULOSIN HYDROCHLORIDE 0.4 MILLIGRAM(S): 0.4 CAPSULE ORAL at 21:43

## 2022-01-01 RX ADMIN — Medication 100 MILLIGRAM(S): at 09:25

## 2022-01-01 RX ADMIN — SODIUM CHLORIDE 75 MILLILITER(S): 9 INJECTION, SOLUTION INTRAVENOUS at 16:46

## 2022-01-01 RX ADMIN — REMDESIVIR 500 MILLIGRAM(S): 5 INJECTION INTRAVENOUS at 11:38

## 2022-01-01 RX ADMIN — AMLODIPINE BESYLATE 10 MILLIGRAM(S): 2.5 TABLET ORAL at 11:07

## 2022-01-01 RX ADMIN — ENOXAPARIN SODIUM 40 MILLIGRAM(S): 100 INJECTION SUBCUTANEOUS at 21:41

## 2022-01-01 RX ADMIN — Medication 2: at 21:58

## 2022-01-01 RX ADMIN — CLOPIDOGREL BISULFATE 75 MILLIGRAM(S): 75 TABLET, FILM COATED ORAL at 11:58

## 2022-01-01 RX ADMIN — QUETIAPINE FUMARATE 25 MILLIGRAM(S): 200 TABLET, FILM COATED ORAL at 21:24

## 2022-01-01 RX ADMIN — ENOXAPARIN SODIUM 40 MILLIGRAM(S): 100 INJECTION SUBCUTANEOUS at 21:05

## 2022-01-01 RX ADMIN — SENNA PLUS 2 TABLET(S): 8.6 TABLET ORAL at 21:27

## 2022-01-01 RX ADMIN — ATORVASTATIN CALCIUM 20 MILLIGRAM(S): 80 TABLET, FILM COATED ORAL at 21:43

## 2022-01-01 RX ADMIN — CLOPIDOGREL BISULFATE 75 MILLIGRAM(S): 75 TABLET, FILM COATED ORAL at 11:36

## 2022-01-01 RX ADMIN — Medication 10 MILLIGRAM(S): at 04:05

## 2022-01-01 RX ADMIN — Medication 60 MILLIGRAM(S): at 18:14

## 2022-01-01 RX ADMIN — Medication 6 MILLIGRAM(S): at 10:39

## 2022-01-01 RX ADMIN — Medication 4: at 17:55

## 2022-01-01 RX ADMIN — QUETIAPINE FUMARATE 25 MILLIGRAM(S): 200 TABLET, FILM COATED ORAL at 21:05

## 2022-01-01 RX ADMIN — ATORVASTATIN CALCIUM 20 MILLIGRAM(S): 80 TABLET, FILM COATED ORAL at 22:25

## 2022-01-01 RX ADMIN — APIXABAN 5 MILLIGRAM(S): 2.5 TABLET, FILM COATED ORAL at 05:41

## 2022-01-01 RX ADMIN — APIXABAN 5 MILLIGRAM(S): 2.5 TABLET, FILM COATED ORAL at 21:43

## 2022-01-01 RX ADMIN — AMLODIPINE BESYLATE 5 MILLIGRAM(S): 2.5 TABLET ORAL at 06:14

## 2022-01-01 RX ADMIN — CLOPIDOGREL BISULFATE 75 MILLIGRAM(S): 75 TABLET, FILM COATED ORAL at 11:49

## 2022-01-01 RX ADMIN — AMLODIPINE BESYLATE 5 MILLIGRAM(S): 2.5 TABLET ORAL at 06:45

## 2022-01-01 RX ADMIN — Medication 650 MILLIGRAM(S): at 21:50

## 2022-01-01 RX ADMIN — Medication 2: at 17:36

## 2022-01-01 RX ADMIN — Medication 6 MILLIGRAM(S): at 09:27

## 2022-01-01 RX ADMIN — ROBINUL 0.2 MILLIGRAM(S): 0.2 INJECTION INTRAMUSCULAR; INTRAVENOUS at 15:07

## 2022-01-01 RX ADMIN — Medication 10 MILLIGRAM(S): at 14:09

## 2022-01-01 RX ADMIN — AMLODIPINE BESYLATE 5 MILLIGRAM(S): 2.5 TABLET ORAL at 05:56

## 2022-01-01 RX ADMIN — Medication 10 MILLIGRAM(S): at 05:35

## 2022-01-01 RX ADMIN — PIPERACILLIN AND TAZOBACTAM 200 GRAM(S): 4; .5 INJECTION, POWDER, LYOPHILIZED, FOR SOLUTION INTRAVENOUS at 19:20

## 2022-01-01 RX ADMIN — SENNA PLUS 2 TABLET(S): 8.6 TABLET ORAL at 21:05

## 2022-01-01 RX ADMIN — Medication 650 MILLIGRAM(S): at 04:04

## 2022-01-01 RX ADMIN — Medication 100 MILLIGRAM(S): at 13:22

## 2022-01-01 RX ADMIN — TAMSULOSIN HYDROCHLORIDE 0.4 MILLIGRAM(S): 0.4 CAPSULE ORAL at 22:25

## 2022-01-01 RX ADMIN — APIXABAN 5 MILLIGRAM(S): 2.5 TABLET, FILM COATED ORAL at 07:04

## 2022-01-01 RX ADMIN — Medication 2: at 22:12

## 2022-01-01 RX ADMIN — AMLODIPINE BESYLATE 10 MILLIGRAM(S): 2.5 TABLET ORAL at 05:41

## 2022-01-01 RX ADMIN — SODIUM CHLORIDE 500 MILLILITER(S): 9 INJECTION, SOLUTION INTRAVENOUS at 11:21

## 2022-01-01 RX ADMIN — HALOPERIDOL DECANOATE 1 MILLIGRAM(S): 100 INJECTION INTRAMUSCULAR at 18:00

## 2022-01-01 RX ADMIN — QUETIAPINE FUMARATE 25 MILLIGRAM(S): 200 TABLET, FILM COATED ORAL at 22:17

## 2022-01-01 RX ADMIN — Medication 2: at 18:02

## 2022-01-01 RX ADMIN — Medication 6 MILLIGRAM(S): at 12:32

## 2022-01-01 RX ADMIN — TAMSULOSIN HYDROCHLORIDE 0.4 MILLIGRAM(S): 0.4 CAPSULE ORAL at 22:19

## 2022-01-01 RX ADMIN — Medication 650 MILLIGRAM(S): at 15:32

## 2022-01-01 RX ADMIN — CLOPIDOGREL BISULFATE 75 MILLIGRAM(S): 75 TABLET, FILM COATED ORAL at 11:51

## 2022-01-01 RX ADMIN — APIXABAN 5 MILLIGRAM(S): 2.5 TABLET, FILM COATED ORAL at 18:32

## 2022-01-15 NOTE — ED PROVIDER NOTE - NS_BEDUNITTYPES_ED_ALL_ED
Airway patent, TM normal bilaterally, normal appearing mouth, nose, throat, neck supple with full range of motion, no cervical adenopathy. TELE/STEPDOWN

## 2022-01-15 NOTE — H&P ADULT - PROBLEM SELECTOR PLAN 3
Hx of carotid artery stenosis; s/p R PTA in 2019 at [facility]  - CT Neck with right carotid stent in place with narrowing of its lumen   - Vascular consulted   - Continue Asp/Plavix/Atorva (IC for Crestor 5) Hx of carotid artery stenosis; s/p R PTA in 2019 at [facility]  - CT Neck with right carotid stent in place with narrowing of its lumen   - Vascular consulted - bilateral carotid duplex ordered   - Continue Plavix/Atorva (IC for Crestor 5)

## 2022-01-15 NOTE — ED PROVIDER NOTE - CLINICAL SUMMARY MEDICAL DECISION MAKING FREE TEXT BOX
s/p fall from bed today, fall yesterday as well. pt unable to provide clear hx. +abrasion noted to left knee  -check labs  -ekg  -xrays  -CT

## 2022-01-15 NOTE — ED PROVIDER NOTE - CARE PLAN
1 Principal Discharge DX:	Elevated troponin  Secondary Diagnosis:	Fall  Secondary Diagnosis:	Abrasion of left knee  Secondary Diagnosis:	2019 novel coronavirus disease (COVID-19)

## 2022-01-15 NOTE — H&P ADULT - PROBLEM SELECTOR PLAN 2
On admission troponin elevated to 0.15 -> 0.11  - CK: 1843 -> 2341, CKMB: 11.4 -> 14.6  - ECHO from 2019 - EF: 57%, Mild MR, Trace TR   - Tropinin likely elevated from fall   - Repeat ECHO ordered On admission troponin elevated to 0.15 -> 0.11  - CK: 1843 -> 2341, CKMB: 11.4 -> 14.6  - ECHO from 2019 - EF: 57%, Mild MR, Trace TR   - Troponin likely elevated from fall   - Repeat ECHO ordered On admission troponin elevated to 0.15 -> 0.11  - CK: 1843 -> 2341, CKMB: 11.4 -> 14.6  - EKG: Afib @ 52 bpm, peaked t waves, RBBB  - ECHO from 2019 - EF: 57%, Mild MR, Trace TR   - Troponin likely elevated from fall   - Repeat ECHO ordered

## 2022-01-15 NOTE — H&P ADULT - PROBLEM SELECTOR PLAN 6
Hx of bph, home meds: tamsulosin 0.4 mg once daily Patient + for COVID-19  - Ddimer elevated to 413, Ferritin: 329, LDH: 327, CRP: 120.1; procal: 1.12  - Medicine consulted  - Trend COVID labs RULE OUT - Patient with elevated BNP to 8824, then elevated to 76585  - ECHO from 2019 with normal EF, mild MR, trace MR  - Repeat echo ordered

## 2022-01-15 NOTE — PROGRESS NOTE ADULT - ASSESSMENT
Impression:  1) history of fall and loss of consciousness, patient is a poor historian.  No further details of the history - to differentiate causes of fall - seizure vs syncope.    Plan  1) vEEG (ran out of time for hook-ups tonight, so will start tmrw)

## 2022-01-15 NOTE — ED PROVIDER NOTE - OBJECTIVE STATEMENT
89M denies PMH, BIBEMS s/p fall.  per EMS pt fell from bed and was unable to get up from floor.  per EMS pt reportedly fell yesterday as well.  pt states he doesn't know what happened today and doesn't think he fell.  states he feels ok.  no vomiting.

## 2022-01-15 NOTE — H&P ADULT - PROBLEM SELECTOR PLAN 10
Hx of HLD, home meds: Rosuvastatin 5 mg once daily   - F/u lipid panel  - Cont. Atorva 40 (TIC for Rosuvastatin 5 mg)    F: none   E: K >4, Mg > 2  N: DASH/TLC  Dvt: lovenox   Dispo: pending clinical progression     Case discussed with Dr. Enamorado

## 2022-01-15 NOTE — H&P ADULT - ATTENDING COMMENTS
Patient seen and examined. Case discussed with ACP    89M dementia HTN colon ca prior carotid artery stents, OA, hx of imbalance per notes who presented after falls. Pt not sure why he is here. Denies any complaints. Found to be covid + and in afib with low rates. Elevated trop, CK. On tele with irregular rhythm. Pt's wife at home also has been unwell. No other support system.     Chest pain free. Trop downtrending. No sign valvular murmurs on exam.   Need collateral re: meds, medical/cardiac history.   Has hx of imbalance concern for neurodegenerative process per outpt notes -- may be related to current falls  continue tele monitoring  for presumed new afib, favor a/c with low dose apixaban given high chads vasc and stopping asa (continue clopidogrel) if ok from vascular/carotid stent perspective, CT head without any note of bleed  (repeat EKG appears regular but no clear P waves preceeding each qrs- on tele noted to be with irregular rhythm so likely with afib though may be paroxysmal)  orthostatic vs  neuro eval  medicine eval for covid, falls  tte pending  repeat BMP to ensure stable K  NT-pro bnp elevated but does not appear volume overloaded on exam and CK is elevated so may have component of rhabdo -- will defer diuresis for now  Resume BP meds, statin  will need PT, SW eval given home situation     Isaac Enamorado MD

## 2022-01-15 NOTE — H&P ADULT - ASSESSMENT
Patient is a 89 y/o M, POOR HISTORIAN, with PMHx of Dementia (A & O x 1-2 self/place), HTN, colon cancer 2012 (s/p left hemicolectomy - chemoport in place), bilateral carotid artery stenosis (s/p stents 2019), osteoarthritis of hands, hx of imbalance , who presented to [facility] ED [facility] on 1/15/22 after fall from bed. Patient was found lying down supine in living room - wife said he tripped and fell, so she called 911.  Patient found to be COVID-19 +, tropinin elevated to 0.15, and ECG with afib @ 52 bpm, with RBBB, peaked T waves. Patient admitted to cardiology/telemetry for further management of syncope.

## 2022-01-15 NOTE — CONSULT NOTE ADULT - SUBJECTIVE AND OBJECTIVE BOX
Vascular Attending:  Dr. Balbuena    HPI:  Patient is a 89 y/o M, POOR HISTORIAN, with PMHx of Dementia (A & O x 1-2 self/place), HTN, colon cancer 2012 (s/p left hemicolectomy - chemoport in place), bilateral carotid artery stenosis (s/p stents 2019), osteoarthritis of hands, hx of imbalance , who presented to [facility] ED Bi[facility] on 1/15/22 after fall from bed. Patient was found lying down supine in living room - wife said he tripped and fell, so she called 911.  Patient states that he does not remember what happened and does not state he fell. Patient denies CP, SOB, palpitations, dizziness, fatigue, headache, n/v/d, abdominal pain, blurry vision, loss of sensation, numbness or tingling.     Upon admit, VS - T 98.3F, HR 67, /76, RR 17, SpO2 97% on RA. Labs significant for ALT 27, AST, blood glucose 113, BUN/Cr 28/1.33, PTT 1.7, INR 1.5, H/H 10.9/34.4, platelet 107, covid +, troponin 0.15.  CT  head prelim read 1/15: no acute ICH or calvarial fracture, posterior-superior R scalp bruising, asymmetric atrophy of L anterior maxillary horn, chronic small vessel ischemic disease. CT cervical spine prelim 1/15: No vertebral body fracture or dislocation. ECG 1/15/22: slow Afib @ 52 VR w/ RBBB, peaked T waves.  Pt admitted to [facility] for tele for further management of syncope.  (15 Macario 2022 11:42)    Vascular addendum: Patient seen and examined at bedside.  Patient is a poor historian. Denies having a fall yesterday. CT head negative for bleed. CT C spine was performed showing right carotid stent in place with narrowing of its lumen. Vascular consulted to workup possibility of carotid stent narrowing as possible cause of fall. Patient denies numbness, HA, vision changes, motor and sensation changes. Denie CP, SOB, n/v, and ab pain. Endorses chills and fatigue      PAST MEDICAL & SURGICAL HISTORY:  HTN (hypertension)    High cholesterol    BPH (benign prostatic hyperplasia)    Colon cancer    H/O hemicolectomy    Presence of internal carotid stent          MEDICATIONS  (STANDING):  amLODIPine   Tablet 5 milliGRAM(s) Oral daily  apixaban 5 milliGRAM(s) Oral two times a day  atorvastatin 20 milliGRAM(s) Oral at bedtime  clopidogrel Tablet 75 milliGRAM(s) Oral daily  enalapril 10 milliGRAM(s) Oral daily  tamsulosin 0.4 milliGRAM(s) Oral at bedtime    MEDICATIONS  (PRN):      Allergies    No Known Allergies    Intolerances        SOCIAL HISTORY:    FAMILY HISTORY:      Vital Signs Last 24 Hrs  T(C): 36.6 (15 Macario 2022 22:22), Max: 37.2 (15 Macario 2022 15:46)  T(F): 97.9 (15 Macario 2022 22:22), Max: 98.9 (15 Macario 2022 15:46)  HR: 74 (15 Macario 2022 20:50) (42 - 84)  BP: 176/73 (15 Macario 2022 20:50) (156/75 - 187/75)  BP(mean): 106 (15 Macario 2022 15:46) (89 - 106)  RR: 18 (15 Macario 2022 20:50) (17 - 18)  SpO2: 99% (15 Macario 2022 20:50) (97% - 99%)    PHYSICAL EXAM:    GENERAL: NAD, speaks in full sentences, no signs of respiratory distress  HEAD:  Atraumatic, Normocephalic  NECK: Supple, No JVD  CHEST/LUNG: nonlabored breathing  HEART: rate controlled Afib  ABDOMEN: Soft, Nontender, Nondistended  NEUROLOGY: AOx1-2 (not orientated to time and hospital name), able to shrug against resistance, stick tongue out and move side to side, raise eyebrows, strength 5/5 x4, sensation grossly intact.          LABS:                        10.9   5.53  )-----------( 107      ( 15 Macario 2022 04:58 )             34.4     01-15    138  |  101  |  32<H>  ----------------------------<  69<L>  4.1   |  19<L>  |  1.20    Ca    8.8      15 Macario 2022 18:35  Mg     2.4     01-15    TPro  6.4  /  Alb  3.6  /  TBili  1.2  /  DBili  x   /  AST  73<H>  /  ALT  27  /  AlkPhos  46  01-15    PT/INR - ( 15 Macario 2022 04:58 )   PT: 17.7 sec;   INR: 1.50          PTT - ( 15 Macario 2022 04:58 )  PTT:31.6 sec  Urinalysis Basic - ( 15 Macario 2022 10:00 )    Color: Yellow / Appearance: Clear / SG: >=1.030 / pH: x  Gluc: x / Ketone: Trace mg/dL  / Bili: Negative / Urobili: 0.2 E.U./dL   Blood: x / Protein: 100 mg/dL / Nitrite: NEGATIVE   Leuk Esterase: NEGATIVE / RBC: < 5 /HPF / WBC < 5 /HPF   Sq Epi: x / Non Sq Epi: 0-5 /HPF / Bacteria: Present /HPF        RADIOLOGY & ADDITIONAL STUDIES    89 y/o M, POOR HISTORIAN, with PMHx of Dementia (A & O x 1-2 self/place), HTN, colon cancer 2012 (s/p left hemicolectomy - chemoport in place), bilateral carotid artery stenosis (s/p stents 2019), osteoarthritis of hands, hx of imbalance , who presented to [facility] ED BiEM[facility] on 1/15/22 after fall from bed. Vascular consulted to workup possibility of carotid stent narrowing as possible cause of fall due to CT C spine findings of R carotid stent narrowing.       - obtain b/l carotid duplex   - if you will start on Eliquis for A-fib, keep ASA or plavix on for carotid stents.  - team 3c will continue to follow

## 2022-01-15 NOTE — H&P ADULT - PROBLEM SELECTOR PLAN 5
Patient + for COVID-19  - Ddimer elevated to 413, Ferritin: 329, LDH: 327, CRP: 120.1; procal: 1.12  - Medicine consulted  - Trend COVID labs Hx of htn, home med: Enalapril 10 mg once daily Hx of htn, home med: Enalapril 10 mg once daily  - Start Enalapril 10 mg once daily   - Cont Amlodipine 5 mg once daily

## 2022-01-15 NOTE — H&P ADULT - PROBLEM SELECTOR PLAN 1
Patient found by wife on floor, does not remember falling or how he felt prior   - As per outpatient notes, patient has hx of feeling off balance - was attributed to combination of neurodegenerative process, encephalomalacia, spinal stenosis.  - CT Head 1/15/22: No acute intracranial hemorrhage or calvarial fracture. Posterior-superior right scalp bruising.  Asymmetrical atrophy of the left anterior maxillary horn.  Chronic small vessel ischemic disease.   - CT Neck/Cervical 1/15/22: No vertebral body fracture or dislocation. Limited evaluation of the neck demonstrates a right carotid stent in place with narrowing of its lumen  - Troponin elevated to 0.15 -> 0.11; CK: 1843 ->2341, CKMB: 11.4 -> 14.6  - UA with ketones, proteins, and small bacteria   - Vascular consulted   - Neuro consulted - rec VEEG to r/o seizure   - Medicine consulted   - ECHO ordered   - Patient found to be + COVID-19 (see below for management)   - Telemetry monitoring

## 2022-01-15 NOTE — PATIENT PROFILE ADULT - FALL HARM RISK - HARM RISK INTERVENTIONS
Assistance with ambulation/Assistance OOB with selected safe patient handling equipment/Communicate Risk of Fall with Harm to all staff/Discuss with provider need for PT consult/Monitor for mental status changes/Monitor gait and stability/Move patient closer to nurses' station/Reinforce activity limits and safety measures with patient and family/Reorient to person, place and time as needed/Tailored Fall Risk Interventions/Toileting schedule using arm’s reach rule for commode and bathroom/Use of alarms - bed, chair and/or voice tab/Visual Cue: Yellow wristband and red socks/Bed in lowest position, wheels locked, appropriate side rails in place/Call bell, personal items and telephone in reach/Instruct patient to call for assistance before getting out of bed or chair/Non-slip footwear when patient is out of bed/Turner to call system/Physically safe environment - no spills, clutter or unnecessary equipment/Purposeful Proactive Rounding/Room/bathroom lighting operational, light cord in reach

## 2022-01-15 NOTE — ED PROVIDER NOTE - PROGRESS NOTE DETAILS
elevated troponin - ekg with afib and junctional pacemaker. pt denies chest pain, unclear if cardiac event tonight. will admit to cards.  pt also found to be covid+    no answer at phone number in chart. per HIE progress not pt with hx of htn, carotid stent (on asa, plavix), high chol, bph, colon ca (hemicolectomy)

## 2022-01-15 NOTE — H&P ADULT - PROBLEM SELECTOR PLAN 7
Hx of HLD, home meds: Rosuvastatin 5 mg once daily   - F/u lipid panel    F: none   E: K >4, Mg > 2  N: DASH/TLC  Dvt: lovenox   Dispo: pending clinical progression     Case discussed with Dr. Enamorado Hx of bph, home meds: tamsulosin 0.4 mg once daily Hx of htn, home med: Enalapril 10 mg once daily  - Start Enalapril 10 mg once daily   - Cont Amlodipine 5 mg once daily

## 2022-01-15 NOTE — ED ADULT NURSE NOTE - OBJECTIVE STATEMENT
pt is AOx1-2 to self, and place. pt  has hx of dementia, s/p sliding down the bed. pt was then unable to get up and his wife called EMS. denies LOC, or head trauma. denies any pain at this time. no visible wounds noted. pupils equal and reactive to light. no obvious deformity seen.

## 2022-01-15 NOTE — ED PROVIDER NOTE - NSICDXPASTMEDICALHX_GEN_ALL_CORE_FT
PAST MEDICAL HISTORY:  BPH (benign prostatic hyperplasia)     Colon cancer     High cholesterol     HTN (hypertension)

## 2022-01-15 NOTE — H&P ADULT - PROBLEM SELECTOR PLAN 4
Hx of htn, home med: Enalapril 10 mg once daily RULE OUT - Patient with elevated BNP to 8824, then elevated to 08748  - ECHO from 2019 with normal EF, mild MR, trace MR  - Repeat echo ordered Patient with afib on admission, rates to 50s  - Unclear if new afib - not on A/C  - EKG: Afib @ 52 bpm, peaked t waves, RBBB  - Tele monitoring   - CHADsVASc: 4   - Awaiting vascular recs if ok to stop Aspirin; if so will start Eliquis 2.5 mg BID   - EP consult   - Tele monitoring

## 2022-01-15 NOTE — H&P ADULT - HISTORY OF PRESENT ILLNESS
Patient is a 89 yo M with dementia (A&O x1-2 self/place), PMHx HTN, Afib (AC?), colon CA s/p hemocolectomy (chemoport in place), carotid artery stenosis (s/p carotid stent) who was BIBEMS s/p fall from bed and was unable to get up off floor. Per EMS,  pt found lying down supine in living room. Wife said he tripped and fell and called 911 to help him get back into bed. Pt tripped and fell yesterday as well. Patient states he does not know what happened and doesn't think he fell.     Upon admit, VS - T 98.3F, HR 67, /76, RR 17, SpO2 97% on RA. Labs significant for ALT 27, AST, blood glucose 113, BUN/Cr 28/1.33, PTT 1.7, INR 1.5, H/H 10.9/34.4, platelet 107, covid +, troponin 0.15.  CT  head prelim read 1/15: no acute ICH or calvarial fracture, posterior-superior R scalp bruising, asymmetric atrophy of L anterior maxillary horm, chronic small vessl ischemic disease. CT cervical spine prelim 1/15: No vertebral body fracture or dislocation. ECG 1/15/22: slow Afib @ 52 VR w/ RBBB, peaked T waves.  Pt admitted to [facility] tele for tele, r/o ACS, management of Covid-19.     knee abrasions     Patient is a 89 y/o M, POOR HISTORIAN, with PMHx of Dementia (A & O x 1-2 self/place), HTN, colon cancer 2012 (s/p left hemicolectomy - chemoport in place), bilateral carotid artery stenosis (s/p stents 2019), osteoarthritis of hands, hx of imbalance , who presented to [facility] ED BiEM[facility] on 1/15/22 after fall from bed. Patient was found lying down supine in living room - wife said he tripped and fell, so she called 911.  Patient states that he does not remember what happened and does not state he fell. Patient denies CP, SOB, palpitations, dizziness, fatigue, headache, n/v/d, abdominal pain, blurry vision, loss of sensation, numbness or tingling.     Upon admit, VS - T 98.3F, HR 67, /76, RR 17, SpO2 97% on RA. Labs significant for ALT 27, AST, blood glucose 113, BUN/Cr 28/1.33, PTT 1.7, INR 1.5, H/H 10.9/34.4, platelet 107, covid +, troponin 0.15.  CT  head prelim read 1/15: no acute ICH or calvarial fracture, posterior-superior R scalp bruising, asymmetric atrophy of L anterior maxillary horn, chronic small vessel ischemic disease. CT cervical spine prelim 1/15: No vertebral body fracture or dislocation. ECG 1/15/22: slow Afib @ 52 VR w/ RBBB, peaked T waves.  Pt admitted to [facility] for tele for further management of syncope.

## 2022-01-15 NOTE — H&P ADULT - PROBLEM SELECTOR PLAN 8
Hx of HLD, home meds: Rosuvastatin 5 mg once daily   - F/u lipid panel    F: none   E: K >4, Mg > 2  N: DASH/TLC  Dvt: lovenox   Dispo: pending clinical progression     Case discussed with Dr. Enamorado Hx of HLD, home meds: Rosuvastatin 5 mg once daily   - F/u lipid panel  - Cont. Atorva 40 (TIC for Rosuvastatin 5 mg)    F: none   E: K >4, Mg > 2  N: DASH/TLC  Dvt: lovenox   Dispo: pending clinical progression     Case discussed with Dr. Enamorado Patient with hx of CKD as per MD note - CKD stage 2  - Cr 1.7 in 04/2021, 1.0 12/2021  - Cr on admission: 1.33  - Will repeat Creatinine  - Continue to trend, avoid nephrotoxic agents

## 2022-01-15 NOTE — PROGRESS NOTE ADULT - SUBJECTIVE AND OBJECTIVE BOX
CHIEF COMPLAINT:    HPI:  Patient is a 89 y/o M, POOR HISTORIAN, with PMHx of Dementia (A & O x 1-2 self/place), HTN, colon cancer 2012 (s/p left hemicolectomy - chemoport in place), bilateral carotid artery stenosis (s/p stents 2019), osteoarthritis of hands, hx of imbalance , who presented to [facility] ED Bi[facility] on 1/15/22 after fall from bed. Patient was found lying down supine in living room - wife said he tripped and fell, so she called 911.  Patient states that he does not remember what happened but does not think he lost consciousness.  Prior he stated he did not fall. Patient denies CP, SOB, palpitations, dizziness, fatigue, headache, n/v/d, abdominal pain, blurry vision, loss of sensation, numbness or tingling.     Upon admit, VS - T 98.3F, HR 67, /76, RR 17, SpO2 97% on RA. Labs significant for ALT 27, AST, blood glucose 113, BUN/Cr 28/1.33, PTT 1.7, INR 1.5, H/H 10.9/34.4, platelet 107, covid +, troponin 0.15.  CT  head prelim read 1/15: no acute ICH or calvarial fracture, posterior-superior R scalp bruising, asymmetric atrophy of L anterior maxillary horn, chronic small vessel ischemic disease. CT cervical spine prelim 1/15: No vertebral body fracture or dislocation. ECG 1/15/22: slow Afib @ 52 VR w/ RBBB, peaked T waves.  Pt admitted to [facility] for tele for further management of syncope.  (15 Macario 2022 11:42)      REVIEW OF SYSTEMS:    Constitutional: No fever, weight loss, or fatigue  Eyes: No eye pain, visual disturbances, or discharge  ENMT:  No difficulty hearing, tinnitus, vertigo; No sinus or throat pain  Neck: No pain or stiffness  Respiratory: No cough, wheezing, or shortness of breath  Cardiovascular: No chest pain, palpitations, or leg swelling  Gastrointestinal: No abdominal pain, nausea, vomiting, diarrhea or constipation.   Genitourinary: No dysuria, frequency, hematuria, or incontinence  Neurological: As per HPI  Skin: No itching, burning, rashes, or lesions   Endocrine: No heat or cold intolerance; No hair loss  Musculoskeletal: No joint pain or swelling; No muscle, back, or extremity pain  Psychiatric: No depression, anxiety, mood swings, or difficulty sleeping  Heme/Lymph: No easy bruising or bleeding; No enlarged glands    PAST MEDICAL & SURGICAL HISTORY:  HTN (hypertension)    High cholesterol    BPH (benign prostatic hyperplasia)    Colon cancer    H/O hemicolectomy    Presence of internal carotid stent      MEDICATIONS  (STANDING):  amLODIPine   Tablet 5 milliGRAM(s) Oral daily  atorvastatin 20 milliGRAM(s) Oral at bedtime  clopidogrel Tablet 75 milliGRAM(s) Oral daily  enalapril 10 milliGRAM(s) Oral daily  tamsulosin 0.4 milliGRAM(s) Oral at bedtime    MEDICATIONS  (PRN):    Allergies    No Known Allergies    Intolerances      FAMILY HISTORY:    FAMILY HISTORY:    SOCIAL HISTORY:    Social History:  etoh: denies   smoking history: denies   illicit drugs: denies (15 Macario 2022 11:42)    Living Situation:    Occupation:    Tobacco:    Alcohol:    Drugs:      VITAL SIGNS:  Vital Signs Last 24 Hrs  T(C): 37.2 (15 Macario 2022 15:46), Max: 37.2 (15 Macario 2022 15:46)  T(F): 98.9 (15 Macario 2022 15:46), Max: 98.9 (15 Macario 2022 15:46)  HR: 84 (15 Macario 2022 15:46) (42 - 84)  BP: 174/74 (15 Macario 2022 15:46) (156/75 - 187/75)  BP(mean): 106 (15 Macario 2022 15:46) (89 - 106)  RR: 18 (15 Macario 2022 15:46) (17 - 18)  SpO2: 99% (15 Macario 2022 15:46) (97% - 99%)    PHYSICAL EXAMINATION:  General: Well-developed, well nourished, in no acute distress.  Eyes: Conjunctiva and sclera clear.    Neurologic:  - Mental Status:  Alert, awake, oriented to person, place ([address]), and Year, Month, day of the week, but off on date.   Speech is fluent with intact comprehension;     - Cranial Nerves II-XII:    II: III, IV, VI:  KAI, Extraocular movements are intact without nystagmus.  VII:  Face is symmetric with normal eye closure and smile  XI:  Head turning and shoulder shrug are intact.  XII:  Tongue midline.  - Motor:    Strength   5/5 throughout proximal and distal limbs x 4  Normal muscle bulk and tone throughout.  - Reflexes:  2+ and symmetric at the biceps, triceps, brachioradialis, knees, and ankles.  Plantar responses flexor.    - Sensory:  Intact to light touch  - Coordination:  Finger-nose-finger  Rapid finger taps about equal.

## 2022-01-16 NOTE — PROGRESS NOTE ADULT - ASSESSMENT
Patient is a 89 y/o M, POOR HISTORIAN, with PMHx of Dementia (A & O x 1-2 self/place), HTN, colon cancer 2012 (s/p left hemicolectomy - chemoport in place), bilateral carotid artery stenosis (s/p stents 2019), osteoarthritis of hands, hx of imbalance , who presented to [facility] ED BiEMS on 1/15/22 after fall from bed. Currently undergoing syncopal workup.

## 2022-01-16 NOTE — PROGRESS NOTE ADULT - PROBLEM SELECTOR PLAN 7
-LDL 45  - Cont. Atorva 40 (TIC for Crestor)      DVT: Eliquis   Dispo: pending clinical progression  PT ordered     Case d/w Dr. Enamorado

## 2022-01-16 NOTE — PROGRESS NOTE ADULT - ATTENDING COMMENTS
Patient seen and examined. Case discussed with ACP    89M dementia HTN colon ca prior carotid artery stents, OA, hx of imbalance per notes who presented after falls. Pt not sure why he is here. Denies any complaints. Found to be covid + and in afib with low rates. Elevated trop, CK. On tele with irregular rhythm. Pt's wife at home also has been unwell. No other support system. Chest pain free. Trop downtrending (0.15, 0.11, 0.11). No sign valvular murmurs on exam.     Tele monitoring to r/o arrhythmias (at times with slow afib)  Appreciate neuro and vascular consults  pending EEG, carotid Doppler  pending TTE  for presumed new afib, favor a/c with apixaban given high chads vasc and stopping asa (continue clopidogrel) if ok from vascular/carotid stent perspective, CT head without any note of bleed -- did not meet criteria for low dose apixaban, low threshold to reduce dose if any concerns for bleding given age/borderline weight  medicine eval for covid, falls  BP better controlled  Continue clopidogrel given prior vascular stent, statin  PT, SW eval given home situation     Isaac Enamorado MD Patient seen and examined. Case discussed with ACP    89M dementia HTN colon ca prior carotid artery stents, OA, hx of imbalance per notes who presented after falls. Pt not sure why he is here. Denies any complaints. Found to be covid + and in afib with low rates. Elevated trop, CK. On tele with irregular rhythm. Pt's wife at home also has been unwell. No other support system. Chest pain free. Trop downtrending (0.15, 0.11, 0.11). No sign valvular murmurs on exam.     Tele monitoring to r/o arrhythmias (at times with slow afib)  Appreciate neuro and vascular consults  pending EEG, carotid Doppler  pending TTE  for presumed new afib, favor a/c with apixaban given high chads vasc and stopping asa (continue clopidogrel) if ok from vascular/carotid stent perspective, CT head without any note of bleed -- did not meet criteria for low dose apixaban, low threshold to reduce dose if any concerns for bleding given age/borderline weight  trops elevated but downtrending, no chest pain: low suspicion for ACS, already on anticoag, no BB given low HRs, already on statin, pending TTE  medicine eval for covid, falls  BP better controlled  Continue clopidogrel given prior vascular stent, statin  PT, SW eval given home situation     Isaac Enamorado MD Patient seen and examined (limited given COVID pandemic). Case discussed with ACP    89M dementia HTN colon ca prior carotid artery stents, OA, hx of imbalance per notes who presented after falls. Pt not sure why he is here. Denies any complaints. Found to be covid + and in afib with low rates. Elevated trop, CK. On tele with irregular rhythm. Pt's wife at home also has been unwell. No other support system. Chest pain free. Trop downtrending (0.15, 0.11, 0.11). No sign valvular murmurs on exam.     Tele monitoring to r/o arrhythmias (at times with slow afib)  Appreciate neuro and vascular consults  pending EEG, carotid Doppler  pending TTE  for presumed new afib, favor a/c with apixaban given high chads vasc and stopping asa (continue clopidogrel) if ok from vascular/carotid stent perspective, CT head without any note of bleed -- did not meet criteria for low dose apixaban, low threshold to reduce dose if any concerns for bleding given age/borderline weight  trops elevated but downtrending, no chest pain: low suspicion for ACS, already on anticoag, no BB given low HRs, already on statin, pending TTE  medicine eval for covid, falls  BP better controlled  Continue clopidogrel given prior vascular stent, statin  PT, SW eval given home situation     Isaac Enamorado MD

## 2022-01-16 NOTE — PROGRESS NOTE ADULT - ASSESSMENT
89 y/o M, POOR HISTORIAN, with PMHx of Dementia (A & O x 1-2 self/place), HTN, colon cancer 2012 (s/p left hemicolectomy - chemoport in place), bilateral carotid artery stenosis (s/p stents 2019), osteoarthritis of hands, hx of imbalance  who presents for syncopal episode of unclear etiology.     #Syncope  history of fall and loss of consciousness, patient is a poor historian.  No further details of the history - to differentiate causes of fall - seizure vs syncope.  - vEEG to be placed today  - CT carotid pending to assess carotid stent narrowing as possible cause

## 2022-01-16 NOTE — PROGRESS NOTE ADULT - PROBLEM SELECTOR PLAN 4
Patient + for COVID-19, denies any symptoms this AM   - COVID markers abnormal   - Medicine consulted f/u recs

## 2022-01-16 NOTE — PROGRESS NOTE ADULT - SUBJECTIVE AND OBJECTIVE BOX
OVERNIGHT EVENTS: JOANNA    SUBJECTIVE / INTERVAL HPI: Patient seen and examined at bedside. no acute complaints at this time. He is unable to recall the syncope event.     VITAL SIGNS:  Vital Signs Last 24 Hrs  T(C): 36.6 (16 Jan 2022 13:48), Max: 37.2 (15 Macario 2022 15:46)  T(F): 97.9 (16 Jan 2022 13:48), Max: 98.9 (15 Macario 2022 15:46)  HR: 95 (16 Jan 2022 12:48) (44 - 95)  BP: 139/63 (16 Jan 2022 12:48) (128/59 - 176/73)  BP(mean): 106 (15 Macario 2022 15:46) (106 - 106)  RR: 16 (16 Jan 2022 12:48) (16 - 18)  SpO2: 97% (16 Jan 2022 12:48) (95% - 99%)  I&O's Summary    15 Macario 2022 07:01  -  16 Jan 2022 07:00  --------------------------------------------------------  IN: 240 mL / OUT: 170 mL / NET: 70 mL    16 Jan 2022 07:01  -  16 Jan 2022 14:47  --------------------------------------------------------  IN: 358 mL / OUT: 200 mL / NET: 158 mL        PHYSICAL EXAM:  - Mental Status:  Alert, awake, oriented to person, place ([address]), and Year, Month, day of the week, but off on date. Speech is fluent with intact comprehension;   - Cranial Nerves II-XII:    II: III, IV, VI:  KAI, Extraocular movements are intact without nystagmus.  VII:  Face is symmetric with normal eye closure and smile  XI:  Head turning and shoulder shrug are intact.  XII:  Tongue midline.  - Motor:    Strength   5/5 throughout proximal and distal limbs x 4  Normal muscle bulk and tone throughout.  - Reflexes:  2+ and symmetric at the biceps, triceps, brachioradialis, knees, and ankles.  Plantar responses flexor.    - Sensory:  Intact to light touch  - Coordination:  Finger-nose-finger  Rapid finger taps about equal.    MEDICATIONS:  MEDICATIONS  (STANDING):  amLODIPine   Tablet 5 milliGRAM(s) Oral daily  apixaban 5 milliGRAM(s) Oral two times a day  atorvastatin 20 milliGRAM(s) Oral at bedtime  clopidogrel Tablet 75 milliGRAM(s) Oral daily  enalapril 10 milliGRAM(s) Oral daily  tamsulosin 0.4 milliGRAM(s) Oral at bedtime    MEDICATIONS  (PRN):      ALLERGIES:  Allergies    No Known Allergies    Intolerances        LABS:                        11.8   7.28  )-----------( 112      ( 16 Jan 2022 06:57 )             36.1     01-16    135  |  102  |  32<H>  ----------------------------<  81  4.1   |  18<L>  |  1.35<H>    Ca    8.4      16 Jan 2022 06:57  Mg     2.5     01-16    TPro  6.0  /  Alb  3.1<L>  /  TBili  1.2  /  DBili  x   /  AST  145<H>  /  ALT  44  /  AlkPhos  45  01-16    PT/INR - ( 15 Macario 2022 04:58 )   PT: 17.7 sec;   INR: 1.50          PTT - ( 15 Macario 2022 04:58 )  PTT:31.6 sec  Urinalysis Basic - ( 16 Jan 2022 05:28 )    Color: Yellow / Appearance: Clear / SG: >=1.030 / pH: x  Gluc: x / Ketone: 15 mg/dL  / Bili: Negative / Urobili: 0.2 E.U./dL   Blood: x / Protein: 100 mg/dL / Nitrite: NEGATIVE   Leuk Esterase: NEGATIVE / RBC: 5-10 /HPF / WBC < 5 /HPF   Sq Epi: x / Non Sq Epi: 0-5 /HPF / Bacteria: Present /HPF      CAPILLARY BLOOD GLUCOSE      POCT Blood Glucose.: 80 mg/dL (15 Macario 2022 16:49)      RADIOLOGY & ADDITIONAL TESTS: Reviewed.

## 2022-01-16 NOTE — PROGRESS NOTE ADULT - PROBLEM SELECTOR PLAN 1
-Denies current symptoms this AM  - As per outpatient notes, patient has hx of feeling off balance - was attributed to combination of neurodegenerative process, encephalomalacia, spinal stenosis.  - CT Head 1/15/22: No acute intracranial hemorrhage or calvarial fracture.   - Neuro consulted - rec VEEG to r/o seizure (per EEG tech will attempt to apply VEEG today)  - ECHO ordered if no cardiac findings will transfer to medicine service for further eval

## 2022-01-16 NOTE — PROGRESS NOTE ADULT - SUBJECTIVE AND OBJECTIVE BOX
Interventional Cardiology PA Adult Progress Note    Subjective Assessment: Patient examined at bedside this AM, resting comfortably. Denies CP, SOB, palpitations, fever, chills, cough, or any other complaints.   	  MEDICATIONS:  amLODIPine   Tablet 5 milliGRAM(s) Oral daily  enalapril 10 milliGRAM(s) Oral daily  tamsulosin 0.4 milliGRAM(s) Oral at bedtime  atorvastatin 20 milliGRAM(s) Oral at bedtime  apixaban 5 milliGRAM(s) Oral two times a day  clopidogrel Tablet 75 milliGRAM(s) Oral daily      	    [PHYSICAL EXAM:  TELEMETRY:  T(C): 36.6 (01-16-22 @ 09:02), Max: 37.2 (01-15-22 @ 15:46)  HR: 60 (01-16-22 @ 08:40) (42 - 84)  BP: 133/60 (01-16-22 @ 08:40) (128/59 - 187/75)  RR: 16 (01-16-22 @ 08:40) (16 - 18)  SpO2: 97% (01-16-22 @ 08:40) (95% - 99%)    I&O's Summary    15 Macario 2022 07:01  -  16 Jan 2022 07:00  --------------------------------------------------------  IN: 240 mL / OUT: 170 mL / NET: 70 mL    16 Jan 2022 07:01  -  16 Jan 2022 11:29  --------------------------------------------------------  IN: 240 mL / OUT: 0 mL / NET: 240 mL      Height (cm): 180.3 (01-15 @ 20:50)  Weight (kg): 63.9 (01-15 @ 20:50)  BMI (kg/m2): 19.7 (01-15 @ 20:50)  BSA (m2): 1.82 (01-15 @ 20:50)                                       Appearance: elderly male	  HEENT:   Normal oral mucosa, PERRL, EOMI	  Neck: Supple, - JVD;   Cardiovascular: Normal S1 S2, No JVD, No murmurs,   Respiratory: CTAB 	  Gastrointestinal:  Soft, Non-tender, + BS	  Skin: No rashes, No ecchymoses, No cyanosis  Extremities: Normal range of motion, No clubbing, cyanosis or edema  Vascular: Peripheral pulses palpable 2+ bilaterally  Neurologic: Non-focal  Psychiatry: A & O x 2, Mood & affect appropriate      	    LABS:	 	                        11.8   7.28  )-----------( 112      ( 16 Jan 2022 06:57 )             36.1     01-16    135  |  102  |  32<H>  ----------------------------<  81  4.1   |  18<L>  |  1.35<H>    Ca    8.4      16 Jan 2022 06:57  Mg     2.5     01-16    TPro  6.0  /  Alb  3.1<L>  /  TBili  1.2  /  DBili  x   /  AST  145<H>  /  ALT  44  /  AlkPhos  45  01-16    TSH: Thyroid Stimulating Hormone, Serum: 0.933 uIU/mL (01-16 @ 06:57)    PT/INR - ( 15 Macario 2022 04:58 )   PT: 17.7 sec;   INR: 1.50          PTT - ( 15 Macario 2022 04:58 )  PTT:31.6 sec         Interventional Cardiology PA Adult Progress Note    Subjective Assessment: Patient examined at bedside this AM, resting comfortably. C/o intermittent episodes of dry cough. Denies CP, SOB, palpitations, fever, chills, or any other complaints.   	  MEDICATIONS:  amLODIPine   Tablet 5 milliGRAM(s) Oral daily  enalapril 10 milliGRAM(s) Oral daily  tamsulosin 0.4 milliGRAM(s) Oral at bedtime  atorvastatin 20 milliGRAM(s) Oral at bedtime  apixaban 5 milliGRAM(s) Oral two times a day  clopidogrel Tablet 75 milliGRAM(s) Oral daily      	    [PHYSICAL EXAM:  TELEMETRY:  T(C): 36.6 (01-16-22 @ 09:02), Max: 37.2 (01-15-22 @ 15:46)  HR: 60 (01-16-22 @ 08:40) (42 - 84)  BP: 133/60 (01-16-22 @ 08:40) (128/59 - 187/75)  RR: 16 (01-16-22 @ 08:40) (16 - 18)  SpO2: 97% (01-16-22 @ 08:40) (95% - 99%)    I&O's Summary    15 Macario 2022 07:01  -  16 Jan 2022 07:00  --------------------------------------------------------  IN: 240 mL / OUT: 170 mL / NET: 70 mL    16 Jan 2022 07:01  -  16 Jan 2022 11:29  --------------------------------------------------------  IN: 240 mL / OUT: 0 mL / NET: 240 mL      Height (cm): 180.3 (01-15 @ 20:50)  Weight (kg): 63.9 (01-15 @ 20:50)  BMI (kg/m2): 19.7 (01-15 @ 20:50)  BSA (m2): 1.82 (01-15 @ 20:50)                                       Appearance: elderly male	  HEENT:   Normal oral mucosa, PERRL, EOMI	  Neck: Supple, - JVD;   Cardiovascular: Normal S1 S2, No JVD, No murmurs, irregular  Respiratory: CTAB 	  Gastrointestinal:  Soft, Non-tender, + BS	  Skin: No rashes, No ecchymoses, No cyanosis  Extremities: Normal range of motion, No clubbing, cyanosis or edema  Vascular: Peripheral pulses palpable 2+ bilaterally  Neurologic: Non-focal  Psychiatry: A & O x 2, Mood & affect appropriate      	    LABS:	 	                        11.8   7.28  )-----------( 112      ( 16 Jan 2022 06:57 )             36.1     01-16    135  |  102  |  32<H>  ----------------------------<  81  4.1   |  18<L>  |  1.35<H>    Ca    8.4      16 Jan 2022 06:57  Mg     2.5     01-16    TPro  6.0  /  Alb  3.1<L>  /  TBili  1.2  /  DBili  x   /  AST  145<H>  /  ALT  44  /  AlkPhos  45  01-16    TSH: Thyroid Stimulating Hormone, Serum: 0.933 uIU/mL (01-16 @ 06:57)    PT/INR - ( 15 Macario 2022 04:58 )   PT: 17.7 sec;   INR: 1.50          PTT - ( 15 Macario 2022 04:58 )  PTT:31.6 sec

## 2022-01-16 NOTE — PROGRESS NOTE ADULT - ATTENDING COMMENTS
Dementia with seizure vs syncope work-up.  Also with covid, and now febrile.    Holding off on vEEG until temperature decreases.

## 2022-01-17 NOTE — PROGRESS NOTE ADULT - PROBLEM SELECTOR PLAN 7
-LDL 45  - Cont. Atorva 40 (TIC for Crestor)      DVT: Eliquis   Dispo: pending clinical progression  PT ordered     Case d/w Dr. Arnold

## 2022-01-17 NOTE — PROGRESS NOTE ADULT - PROBLEM SELECTOR PLAN 4
Patient + for COVID-19, Patient with fever x 2 days  - COVID markers abnormal   - Per Medicine recs continue to treat temp as needed. If O2 requirements remain stable no need for MAB or steroids

## 2022-01-17 NOTE — PROGRESS NOTE ADULT - ASSESSMENT
Patient is a 89 y/o M, POOR HISTORIAN, with PMHx of Dementia (A & O x 1-2 self/place), HTN, colon cancer 2012 (s/p left hemicolectomy - chemoport in place), bilateral carotid artery stenosis (s/p stents 2019), osteoarthritis of hands, hx of imbalance , who presented to [facility] on 1/15/22 after fall from bed. Currently undergoing syncopal workup.

## 2022-01-17 NOTE — PHYSICAL THERAPY INITIAL EVALUATION ADULT - GENERAL OBSERVATIONS, REHAB EVAL
Received supine denies pain +EKG, IV hep. Left as found +bed alarm, RN Roseline aware, call villanueva

## 2022-01-17 NOTE — PROGRESS NOTE ADULT - SUBJECTIVE AND OBJECTIVE BOX
Interventional Cardiology PA Adult Progress Note    Subjective Assessment: Patient examined at bedside this AM, states ------  	  MEDICATIONS:  amLODIPine   Tablet 5 milliGRAM(s) Oral daily  enalapril 10 milliGRAM(s) Oral daily  tamsulosin 0.4 milliGRAM(s) Oral at bedtime  benzonatate 100 milliGRAM(s) Oral three times a day PRN  acetaminophen     Tablet .. 650 milliGRAM(s) Oral every 6 hours PRN  atorvastatin 20 milliGRAM(s) Oral at bedtime  apixaban 5 milliGRAM(s) Oral two times a day  clopidogrel Tablet 75 milliGRAM(s) Oral daily  sodium chloride 0.9%. 1000 milliLiter(s) IV Continuous <Continuous>      	    [PHYSICAL EXAM:  TELEMETRY:  T(C): 38.3 (01-17-22 @ 07:21), Max: 40 (01-16-22 @ 15:25)  HR: 68 (01-17-22 @ 05:12) (59 - 95)  BP: 120/57 (01-17-22 @ 05:12) (118/59 - 174/107)  RR: 20 (01-17-22 @ 05:12) (16 - 20)  SpO2: 92% (01-17-22 @ 05:12) (92% - 99%)    I&O's Summary    16 Jan 2022 07:01  -  17 Jan 2022 07:00  --------------------------------------------------------  IN: 1258 mL / OUT: 550 mL / NET: 708 mL                                        Appearance: elderly male	  HEENT:   Normal oral mucosa, PERRL, EOMI	  Neck: Supple, - JVD;   Cardiovascular: Normal S1 S2, No JVD, No murmurs, irregular  Respiratory: CTAB 	  Gastrointestinal:  Soft, Non-tender, + BS	  Skin: No rashes, No ecchymoses, No cyanosis  Extremities: Normal range of motion, No clubbing, cyanosis or edema  Vascular: Peripheral pulses palpable 2+ bilaterally  Neurologic: Non-focal  Psychiatry: A & O x 2, Mood & affect appropriate    LABS:	 	               11.4   9.27  )-----------( 112      ( 17 Jan 2022 06:41 )             33.6     01-17    131<L>  |  102  |  35<H>  ----------------------------<  112<H>  3.9   |  17<L>  |  1.36<H>    Ca    7.5<L>      17 Jan 2022 06:41  Mg     2.4     01-17    TPro  5.7<L>  /  Alb  2.8<L>  /  TBili  1.2  /  DBili  x   /  AST  111<H>  /  ALT  49<H>  /  AlkPhos  45  01-17       Interventional Cardiology PA Adult Progress Note    Subjective Assessment: Patient examined at bedside this AM, endorses fever and chills overnight. States he is feeling better this AM. Denies CP, SOB, or palpitations.  	  MEDICATIONS:  amLODIPine   Tablet 5 milliGRAM(s) Oral daily  enalapril 10 milliGRAM(s) Oral daily  tamsulosin 0.4 milliGRAM(s) Oral at bedtime  benzonatate 100 milliGRAM(s) Oral three times a day PRN  acetaminophen     Tablet .. 650 milliGRAM(s) Oral every 6 hours PRN  atorvastatin 20 milliGRAM(s) Oral at bedtime  apixaban 5 milliGRAM(s) Oral two times a day  clopidogrel Tablet 75 milliGRAM(s) Oral daily  sodium chloride 0.9%. 1000 milliLiter(s) IV Continuous <Continuous>      	    [PHYSICAL EXAM:  TELEMETRY:  T(C): 38.3 (01-17-22 @ 07:21), Max: 40 (01-16-22 @ 15:25)  HR: 68 (01-17-22 @ 05:12) (59 - 95)  BP: 120/57 (01-17-22 @ 05:12) (118/59 - 174/107)  RR: 20 (01-17-22 @ 05:12) (16 - 20)  SpO2: 92% (01-17-22 @ 05:12) (92% - 99%)    I&O's Summary    16 Jan 2022 07:01  -  17 Jan 2022 07:00  --------------------------------------------------------  IN: 1258 mL / OUT: 550 mL / NET: 708 mL                                        Appearance: elderly male	  HEENT:   Normal oral mucosa, PERRL, EOMI	  Neck: Supple, - JVD;   Cardiovascular: Normal S1 S2, No JVD, No murmurs, irregular  Respiratory: CTAB 	  Gastrointestinal:  Soft, Non-tender, + BS	  Skin: No rashes, No ecchymoses, No cyanosis  Extremities: Normal range of motion, No clubbing, cyanosis or edema  Vascular: Peripheral pulses palpable 2+ bilaterally  Neurologic: Non-focal  Psychiatry: A & O x 2, Mood & affect appropriate    LABS:	 	               11.4   9.27  )-----------( 112      ( 17 Jan 2022 06:41 )             33.6     01-17    131<L>  |  102  |  35<H>  ----------------------------<  112<H>  3.9   |  17<L>  |  1.36<H>    Ca    7.5<L>      17 Jan 2022 06:41  Mg     2.4     01-17    TPro  5.7<L>  /  Alb  2.8<L>  /  TBili  1.2  /  DBili  x   /  AST  111<H>  /  ALT  49<H>  /  AlkPhos  45  01-17

## 2022-01-17 NOTE — PHYSICAL THERAPY INITIAL EVALUATION ADULT - IMPAIRMENTS FOUND, PT EVAL
aerobic capacity/endurance/arousal, attention, and cognition/gait, locomotion, and balance/muscle strength/poor safety awareness

## 2022-01-17 NOTE — PROGRESS NOTE ADULT - SUBJECTIVE AND OBJECTIVE BOX
OVERNIGHT EVENTS: Patient febrile to 103 last night    SUBJECTIVE / INTERVAL HPI: Patient says he experienced fever and chills overnight, but feels better after tylenol. denies any lightheadedness or dizziness. No additional syncopal episodes. vEEG not placed due to patient's active fever     VITAL SIGNS:  Vital Signs Last 24 Hrs  T(C): 38.3 (17 Jan 2022 07:21), Max: 40 (16 Jan 2022 15:25)  T(F): 101 (17 Jan 2022 07:21), Max: 104 (16 Jan 2022 15:25)  HR: 52 (17 Jan 2022 08:23) (52 - 95)  BP: 123/59 (17 Jan 2022 08:23) (118/59 - 174/107)  BP(mean): 82 (17 Jan 2022 05:12) (82 - 105)  RR: 16 (17 Jan 2022 08:23) (16 - 20)  SpO2: 94% (17 Jan 2022 08:23) (92% - 99%)  I&O's Summary    16 Jan 2022 07:01  -  17 Jan 2022 07:00  --------------------------------------------------------  IN: 1258 mL / OUT: 550 mL / NET: 708 mL        PHYSICAL EXAM:  - Mental Status:  Alert, awake, oriented to person, place ([address] but recalled [facility] when give the word "lianet"), and Year, Month, day of the week, but off on date. Speech is fluent with intact comprehension;   - Cranial Nerves II-XII:    II: III, IV, VI:  KAI, Extraocular movements are intact without nystagmus.  VII:  Face is symmetric with normal eye closure and smile  XI:  Head turning and shoulder shrug are intact.  XII:  Tongue midline.  - Motor:    Strength   5/5 throughout proximal and distal limbs x 4  Normal muscle bulk and tone throughout.  - Reflexes:  2+ and symmetric at the biceps, triceps, brachioradialis, knees, and ankles.  Plantar responses flexor.  - Sensory:  Intact to light touch  - Coordination:  Finger-nose-finger  Rapid finger taps about equal.    MEDICATIONS:  MEDICATIONS  (STANDING):  amLODIPine   Tablet 5 milliGRAM(s) Oral daily  apixaban 5 milliGRAM(s) Oral two times a day  atorvastatin 20 milliGRAM(s) Oral at bedtime  clopidogrel Tablet 75 milliGRAM(s) Oral daily  enalapril 10 milliGRAM(s) Oral daily  sodium chloride 0.9%. 1000 milliLiter(s) (150 mL/Hr) IV Continuous <Continuous>  tamsulosin 0.4 milliGRAM(s) Oral at bedtime    MEDICATIONS  (PRN):  acetaminophen     Tablet .. 650 milliGRAM(s) Oral every 6 hours PRN Temp greater or equal to 38C (100.4F)  benzonatate 100 milliGRAM(s) Oral three times a day PRN Cough      ALLERGIES:  Allergies    No Known Allergies    Intolerances        LABS:                        11.4   9.27  )-----------( 112      ( 17 Jan 2022 06:41 )             33.6     01-17    131<L>  |  102  |  35<H>  ----------------------------<  112<H>  3.9   |  17<L>  |  1.36<H>    Ca    7.5<L>      17 Jan 2022 06:41  Mg     2.4     01-17    TPro  5.7<L>  /  Alb  2.8<L>  /  TBili  1.2  /  DBili  x   /  AST  111<H>  /  ALT  49<H>  /  AlkPhos  45  01-17      Urinalysis Basic - ( 16 Jan 2022 05:28 )    Color: Yellow / Appearance: Clear / SG: >=1.030 / pH: x  Gluc: x / Ketone: 15 mg/dL  / Bili: Negative / Urobili: 0.2 E.U./dL   Blood: x / Protein: 100 mg/dL / Nitrite: NEGATIVE   Leuk Esterase: NEGATIVE / RBC: 5-10 /HPF / WBC < 5 /HPF   Sq Epi: x / Non Sq Epi: 0-5 /HPF / Bacteria: Present /HPF      CAPILLARY BLOOD GLUCOSE      POCT Blood Glucose.: 80 mg/dL (15 Macario 2022 16:49)      RADIOLOGY & ADDITIONAL TESTS: Reviewed.

## 2022-01-17 NOTE — PROGRESS NOTE ADULT - PROBLEM SELECTOR PLAN 1
- As per outpatient notes, patient has hx of feeling off balance - was attributed to combination of neurodegenerative process, encephalomalacia, spinal stenosis.  - CT Head 1/15/22: No acute intracranial hemorrhage or calvarial fracture.   - Neuro consulted - rec VEEG to r/o seizure per neuro holding off on EEG until temp decreases   - ECHO ordered if no cardiac findings will transfer to medicine service for further eval

## 2022-01-17 NOTE — PROGRESS NOTE ADULT - ASSESSMENT
**Incomplete note**    89 y/o M, POOR HISTORIAN, with PMHx of Dementia (A & O x 1-2 self/place), HTN, colon cancer 2012 (s/p left hemicolectomy - chemoport in place), bilateral carotid artery stenosis (s/p stents 2019), osteoarthritis of hands, hx of imbalance, currently covid-19 positive who presents for syncopal episode of unclear etiology.     #Syncope  history of fall and loss of consciousness, patient is a poor historian liekyl 2/2 dementia.  No further details of the history - to differentiate causes of fall - seizure vs syncope.  - vEEG to be placed when fever improves  - carotid duplex pending to assess carotid stent narrowing as possible cause   - rest of care per primary team   89 y/o M, POOR HISTORIAN, with PMHx of Dementia (A & O x 1-2 self/place), HTN, colon cancer 2012 (s/p left hemicolectomy - chemoport in place), bilateral carotid artery stenosis (s/p stents 2019), osteoarthritis of hands, hx of imbalance, currently covid-19 positive who presents for syncopal episode of unclear etiology.     #Syncope  history of fall and loss of consciousness, patient is a poor historian liekyl 2/2 dementia.  No further details of the history - to differentiate causes of fall - seizure vs syncope.  - vEEG to be placed today if available   - carotid duplex pending to assess carotid stent narrowing as possible cause   - rest of care per primary team    Case discussed with consult attending  Will continue to follow

## 2022-01-17 NOTE — PROGRESS NOTE ADULT - ASSESSMENT
89 y/o M, POOR HISTORIAN, with PMHx of Dementia (A & O x 1-2 self/place), HTN, colon cancer 2012 (s/p left hemicolectomy - chemoport in place), bilateral carotid artery stenosis (s/p stents 2019), osteoarthritis of hands, hx of imbalance , who presented to [facility] ED [facility] on 1/15/22 after fall from bed. Vascular consulted to workup possibility of carotid stent narrowing as possible cause of fall due to CT C spine findings of R carotid stent narrowing.       - f/u result of b/l carotid duplex   - if you will start on Eliquis for A-fib, keep ASA or plavix on for carotid stents.  - team 3c will continue to follow   89 y/o M, POOR HISTORIAN, with PMHx of Dementia (A & O x 1-2 self/place), HTN, colon cancer 2012 (s/p left hemicolectomy - chemoport in place), bilateral carotid artery stenosis (s/p stents 2019), osteoarthritis of hands, hx of imbalance , who presented to [facility] ED [facility] on 1/15/22 after fall from bed. Vascular consulted to workup possibility of carotid stent narrowing as possible cause of fall due to CT C spine findings of R carotid stent narrowing.     - Carotid duplex reviewed w/ Dr. Balbuena and chief resident, no need for acute surgical intervention at this time  - Fall unlikely related to carotid artery disease  - Patient should follow-up with Dr. Balbuena 1 week after discharge. Please call [phone] to schedule an appointment.     Please re-consult team 3c w/ questions/concerns

## 2022-01-17 NOTE — PROGRESS NOTE ADULT - ATTENDING COMMENTS
Dementia and covid.  Fevers continue on and off.    Confused as to the duration of her admission.    vEEG pending for seizure vs syncope work-up.

## 2022-01-17 NOTE — PHYSICAL THERAPY INITIAL EVALUATION ADULT - GAIT DEVIATIONS NOTED, PT EVAL
decreased gera/increased time in double stance/decreased step length/decreased weight-shifting ability

## 2022-01-17 NOTE — PROGRESS NOTE ADULT - ATTENDING COMMENTS
Initial attending contact date 1/17/22     . See PA note written above for details. I reviewed the PA documentation. I have personally seen and examined this patient. I reviewed vitals, labs, medications, cardiac studies, and additional imaging. I agree with the above PA's findings and plans as written above with the following additions/statements.    89M dementia HTN colon ca prior carotid artery stents, OA, hx of imbalance per notes who presented after falls of unclear etiology, covid +  -AAO x 1  -On tele with a fib 40-60  -Undergoing neuro work up  -Awaiting ECHO   -Cont plavix, statin

## 2022-01-17 NOTE — PHYSICAL THERAPY INITIAL EVALUATION ADULT - ADDITIONAL COMMENTS
reports no use of AD however has straight cane, reports living in apartment, however unable to obtain collateral information as patient is poor historian and confused

## 2022-01-17 NOTE — PHYSICAL THERAPY INITIAL EVALUATION ADULT - PERTINENT HX OF CURRENT PROBLEM, REHAB EVAL
89M ho presented to [facility] ED [facility] on 1/15/22 after fall from bed. Patient was found lying down supine in living room - wife said he tripped and fell, so she called 911.  Patient states that he does not remember what happened and does not state he fell.

## 2022-01-18 NOTE — PROGRESS NOTE ADULT - PROBLEM SELECTOR PLAN 4
Patient + for COVID-19, Patient with intermittent fever x 2 days. Satting well on room air  - COVID markers abnormal   - Per Medicine recs continue to treat temp as needed. If O2 requirements remain stable no need for MAB or steroids Patient + for COVID-19 on admission, noted to have fevers but not hypoxic until 1/18AM (SPO2 high 80s on RA) requiring 2-L NC  -f/u CXR, D-dimer, ferritin, ESR/CRP  -Blood cultures x2 sets sent 1/18  -started RDV/decadron 1/18  -incentive spirometer, OOBTC as tolerated  -tylenol PRN  -transfer to medicine for further management

## 2022-01-18 NOTE — EEG REPORT - NS EEG TEXT BOX
[facility] Department of Neurology  Inpatient Continuous video-Electroencephalogram    Patient Name:	CRISS GARCIA    :	11/10/1930  MRN:	[id number]    Study Start Date/Time:  2022, 8:49:41 PM  Study End Date/Time:	In progress    Referred by: Justin Aviles MD    Brief Clinical History:  CRISS GARCIA is a 89 year old Male with history of dementia (A&O x 1-2 at baseline), hypertension, colon cancer s/p left hemicolectomy , b/l carotid artery stenosis s/p stents , and history of gait imbalance and falls, admitted 1/15/2022 for syncope, found COVID positive; study performed to investigate for seizures or markers of epilepsy.    Diagnosis Code:   R56.9 convulsions/seizure  CPT: 50239 EEG with video 12-26h  Technical CPT: 78529 set-up +  13590 vEEG unmonitored 12-26h    Pertinent Medications:  n/a    Acquisition Details:  Electroencephalography was acquired using a minimum of 21 channels on an [employer] system v 8.5.1 with electrode placement according to the standard International 10-20 system following ACNS (American Clinical Neurophysiology Society) guidelines for Long-Term Video EEG monitoring.  Anterior temporal T1 and T2 electrodes were utilized whenever possible.   The [employer] automated spike & seizure detections were all reviewed in detail, in addition to extensive portions of raw EEG.    Day 1: 2022 @ 8:49:41 PM to next morning @ 07:00 am  Background:  continuous, with predominantly alpha and beta frequencies.  Symmetry:  No persistent asymmetries of voltage or frequency.  Posterior Dominant Rhythm:  9 Hz symmetric, well-organized, and well-modulated.  Organization: Appropriate anterior-posterior gradient.  Voltage:  Normal (20+ uV)  Variability: Yes. 		Reactivity: Yes.  N2 sleep: Symmetric, synchronous spindles and K complexes.  Spontaneous Activity:  No epileptiform discharges.  Periodic/rhythmic activity:  None.  Events:  No electrographic seizures or significant clinical events.  Provocations:  Hyperventilation and Photic stimulation: was not performed.    Daily Summary:    Unremarkable awake and sleep overnight EEG recording.    Ino Cortes DO  Clinical Neurophysiology Fellow    Brenda Brown MD  Attending Neurologist, [facility]

## 2022-01-18 NOTE — PROGRESS NOTE ADULT - PROBLEM SELECTOR PLAN 1
- As per outpatient notes, patient has hx of feeling off balance - was attributed to combination of neurodegenerative process, encephalomalacia, spinal stenosis.  - CT Head 1/15/22: No acute intracranial hemorrhage or calvarial fracture.   - Neuro consulted - rec VEEG to r/o seizure per neuro holding off on EEG until temp decreases   - ECHO ordered if no cardiac findings will transfer to medicine service for further eval - As per outpatient notes, patient has hx of feeling off balance - was attributed to combination of neurodegenerative process, encephalomalacia, spinal stenosis.  - CT Head 1/15/22: No acute intracranial hemorrhage or calvarial fracture.   - Neuro consulted - vEEG negative  - Limited echo ordered to eval EF and aortic valve

## 2022-01-18 NOTE — PROGRESS NOTE ADULT - ASSESSMENT
89 y/o M, POOR HISTORIAN, with PMHx of Dementia (A & O x 1-2 self/place), HTN, colon cancer 2012 (s/p left hemicolectomy - chemoport in place), bilateral carotid artery stenosis (s/p stents 2019), osteoarthritis of hands, hx of imbalance, currently covid-19 positive who presents for syncopal episode of unclear etiology. Neurology consulted for syncope.     #Syncope  History of fall and loss of consciousness, patient is a poor historian likely 2/2 dementia.  No further details of the history - to differentiate causes of fall - seizure vs syncope. PT recommends SHEILA.  -CTH negative for acute hemorrhage or fracture   -vEEG showed no electrographic seizures or significant clinical events.  -Pending carotid duplex to assess carotid stent narrowing   -Rest of care per primary team     Neurology will sign off. Please re-consult with any new questions or concerns     Discussed with attending Dr. Combs

## 2022-01-18 NOTE — PROGRESS NOTE ADULT - SUBJECTIVE AND OBJECTIVE BOX
OVERNIGHT EVENTS:  No acute events overnight.    SUBJECTIVE / INTERVAL HPI: Patient seen and examined at bedside. Denies CP, SOB, dizziness/lightheadedness, palpitations    VITAL SIGNS:  Vital Signs Last 24 Hrs  T(C): 36.8 (18 Jan 2022 06:09), Max: 39.1 (17 Jan 2022 21:23)  T(F): 98.3 (18 Jan 2022 06:09), Max: 102.4 (17 Jan 2022 21:23)  HR: 62 (18 Jan 2022 08:14) (58 - 96)  BP: 110/57 (18 Jan 2022 08:14) (97/52 - 146/63)  RR: 16 (18 Jan 2022 08:14) (16 - 19)  SpO2: 97% (18 Jan 2022 08:14) (93% - 97%)      I&O's Summary    17 Jan 2022 07:01  -  18 Jan 2022 07:00  --------------------------------------------------------  IN: 750 mL / OUT: 700 mL / NET: 50 mL    18 Jan 2022 07:01  -  18 Jan 2022 08:53  --------------------------------------------------------  IN: 100 mL / OUT: 0 mL / NET: 100 mL      PHYSICAL EXAM:  Appearance: elderly male	  HEENT:   Normal oral mucosa, PERRL, EOMI	  Neck: Supple, - JVD;   Cardiovascular: Normal S1 S2, No JVD, No murmurs, irregular  Respiratory: CTAB 	  Gastrointestinal:  Soft, Non-tender, + BS	  Skin: No rashes, No ecchymoses, No cyanosis  Extremities: Normal range of motion, No clubbing, cyanosis or edema  Vascular: Peripheral pulses palpable 2+ bilaterally  Neurologic: Non-focal  Psychiatry: A & O x 2, Mood & affect appropriate    MEDICATIONS:  MEDICATIONS  (STANDING):  amLODIPine   Tablet 5 milliGRAM(s) Oral daily  apixaban 5 milliGRAM(s) Oral two times a day  atorvastatin 20 milliGRAM(s) Oral at bedtime  clopidogrel Tablet 75 milliGRAM(s) Oral daily  enalapril 10 milliGRAM(s) Oral daily  sodium chloride 0.9%. 1000 milliLiter(s) (150 mL/Hr) IV Continuous <Continuous>  tamsulosin 0.4 milliGRAM(s) Oral at bedtime    MEDICATIONS  (PRN):  acetaminophen     Tablet .. 650 milliGRAM(s) Oral every 6 hours PRN Temp greater or equal to 38C (100.4F)  benzonatate 100 milliGRAM(s) Oral three times a day PRN Cough      LABS:                        12.1   8.59  )-----------( 125      ( 18 Jan 2022 06:48 )             36.9       01-18    134<L>  |  105  |  34<H>  ----------------------------<  110<H>  4.3   |  18<L>  |  1.31<H>    Ca    7.7<L>      18 Jan 2022 06:48  Mg     2.7     01-18    TPro  5.7<L>  /  Alb  2.8<L>  /  TBili  1.2  /  DBili  x   /  AST  111<H>  /  ALT  49<H>  /  AlkPhos  45  01-17        TELEMETRY:    RADIOLOGY & ADDITIONAL TESTS: Reviewed. 5 [facility] to [facility] Transfer Note    Hospital Course   89 y/o M, POOR HISTORIAN, with PMHx of Dementia (A & O x 1-2 self/place), HTN, colon cancer 2012 (s/p left hemicolectomy - chemoport in place), bilateral carotid artery stenosis (s/p stents 2019), osteoarthritis of hands, hx of imbalance , who presented to [facility] ED [facility] on 1/15/22 after fall from bed vs. syncope, admitted to cardiac tele for syncopal workup, found to be COVID+. Regarding syncope work-up, patient underwent CT Head 1/15/22: No acute intracranial hemorrhage or calvarial fracture, vEEG showed no epileptiform activity. CT Neck/Cervical 1/15/22:  demonstrates a right carotid stent in place with narrowing of its lumen. Vascular consulted- carotid duplex reviewed w/ Dr. Balbuena and chief resident, no need for acute surgical intervention at this time, and fall unlikely related to carotid artery disease. Echo to evaluate for EF and aortic stenosis pending. Telemetry without any significant arrhythmia, however Patient was found to be in rate-controlled Afib (unclear if new, but pt not on AC)., He was started on eliquis 5mg BID. Regarding COVID, patient was intermittently spiking fevers but not hypoxic. On 1/18AM, pt noted to desat to high 80s on RA, requiring 2-3L NC. Patient was started on decadron and remdesevir and accepted to COVID medicine under Dr. Redding.     OVERNIGHT EVENTS:  No acute events overnight.    SUBJECTIVE / INTERVAL HPI: Patient seen and examined at bedside. C/o cough Denies CP, SOB, dizziness/lightheadedness, palpitations    VITAL SIGNS:  Vital Signs Last 24 Hrs  T(C): 36.8 (18 Jan 2022 06:09), Max: 39.1 (17 Jan 2022 21:23)  T(F): 98.3 (18 Jan 2022 06:09), Max: 102.4 (17 Jan 2022 21:23)  HR: 62 (18 Jan 2022 08:14) (58 - 96)  BP: 110/57 (18 Jan 2022 08:14) (97/52 - 146/63)  RR: 16 (18 Jan 2022 08:14) (16 - 19)  SpO2: 97% (18 Jan 2022 08:14) (93% - 97%)      I&O's Summary    17 Jan 2022 07:01  -  18 Jan 2022 07:00  --------------------------------------------------------  IN: 750 mL / OUT: 700 mL / NET: 50 mL    18 Jan 2022 07:01  -  18 Jan 2022 08:53  --------------------------------------------------------  IN: 100 mL / OUT: 0 mL / NET: 100 mL      PHYSICAL EXAM:  Appearance: elderly male	  HEENT:   Normal oral mucosa, PERRL, EOMI	  Neck: Supple, - JVD;   Cardiovascular: Normal S1 S2, No JVD, No murmurs, irregular  Respiratory: CTAB 	  Gastrointestinal:  Soft, Non-tender, + BS	  Skin: No rashes, No ecchymoses, No cyanosis  Extremities: Normal range of motion, No clubbing, cyanosis or edema  Vascular: Peripheral pulses palpable 2+ bilaterally  Neurologic: Non-focal  Psychiatry: A & O x 2, Mood & affect appropriate    MEDICATIONS:  MEDICATIONS  (STANDING):  amLODIPine   Tablet 5 milliGRAM(s) Oral daily  apixaban 5 milliGRAM(s) Oral two times a day  atorvastatin 20 milliGRAM(s) Oral at bedtime  clopidogrel Tablet 75 milliGRAM(s) Oral daily  enalapril 10 milliGRAM(s) Oral daily  sodium chloride 0.9%. 1000 milliLiter(s) (150 mL/Hr) IV Continuous <Continuous>  tamsulosin 0.4 milliGRAM(s) Oral at bedtime    MEDICATIONS  (PRN):  acetaminophen     Tablet .. 650 milliGRAM(s) Oral every 6 hours PRN Temp greater or equal to 38C (100.4F)  benzonatate 100 milliGRAM(s) Oral three times a day PRN Cough      LABS:                        12.1   8.59  )-----------( 125      ( 18 Jan 2022 06:48 )             36.9       01-18    134<L>  |  105  |  34<H>  ----------------------------<  110<H>  4.3   |  18<L>  |  1.31<H>    Ca    7.7<L>      18 Jan 2022 06:48  Mg     2.7     01-18    TPro  5.7<L>  /  Alb  2.8<L>  /  TBili  1.2  /  DBili  x   /  AST  111<H>  /  ALT  49<H>  /  AlkPhos  45  01-17        TELEMETRY:    RADIOLOGY & ADDITIONAL TESTS: Reviewed. 5 [facility] to [facility] Transfer Note    Hospital Course   89 y/o M, POOR HISTORIAN, with PMHx of Dementia (A & O x 1-2 self/place), HTN, colon cancer 2012 (s/p left hemicolectomy - chemoport in place), bilateral carotid artery stenosis (s/p stents 2019), osteoarthritis of hands, hx of imbalance , who presented to [facility] ED [facility] on 1/15/22 after fall from bed vs. syncope, admitted to cardiac tele for syncopal workup, found to be COVID+. Regarding syncope work-up, patient underwent CT Head 1/15/22: No acute intracranial hemorrhage or calvarial fracture, vEEG showed no epileptiform activity. CT Neck/Cervical 1/15/22:  demonstrates a right carotid stent in place with narrowing of its lumen. Vascular consulted- carotid duplex reviewed w/ Dr. Balbuena and chief resident, no need for acute surgical intervention at this time, and fall unlikely related to carotid artery disease. Echo to evaluate for EF and aortic stenosis pending. Telemetry without any significant arrhythmia, however Patient was found to be in rate-controlled Afib (unclear if new, but pt not on AC)., He was started on eliquis 5mg BID. Regarding COVID, patient was intermittently spiking fevers but not hypoxic. On 1/18AM, pt noted to desat to high 80s on RA, requiring 2-3L NC. Patient was started on decadron and remdesevir and accepted to COVID medicine under Dr. Redding.     OVERNIGHT EVENTS:  No acute events overnight.    SUBJECTIVE / INTERVAL HPI: Patient seen and examined at bedside. C/o cough Denies CP, SOB, dizziness/lightheadedness, palpitations    VITAL SIGNS:  Vital Signs Last 24 Hrs  T(C): 36.8 (18 Jan 2022 06:09), Max: 39.1 (17 Jan 2022 21:23)  T(F): 98.3 (18 Jan 2022 06:09), Max: 102.4 (17 Jan 2022 21:23)  HR: 62 (18 Jan 2022 08:14) (58 - 96)  BP: 110/57 (18 Jan 2022 08:14) (97/52 - 146/63)  RR: 16 (18 Jan 2022 08:14) (16 - 19)  SpO2: 97% (18 Jan 2022 08:14) (93% - 97%)      I&O's Summary    17 Jan 2022 07:01  -  18 Jan 2022 07:00  --------------------------------------------------------  IN: 750 mL / OUT: 700 mL / NET: 50 mL    18 Jan 2022 07:01  -  18 Jan 2022 08:53  --------------------------------------------------------  IN: 100 mL / OUT: 0 mL / NET: 100 mL      PHYSICAL EXAM:  Appearance: elderly male	  HEENT:   Normal oral mucosa, PERRL, EOMI	  Neck: Supple, - JVD;   Cardiovascular: Normal S1 S2, No JVD, No murmurs, irregular  Respiratory: CTAB 	  Gastrointestinal:  Soft, Non-tender, + BS	  Skin: No rashes, No ecchymoses, No cyanosis  Extremities: Normal range of motion, No clubbing, cyanosis or edema  Vascular: Peripheral pulses palpable 2+ bilaterally  Neurologic: Non-focal  Psychiatry: A & O x 2, Mood & affect appropriate    MEDICATIONS:  MEDICATIONS  (STANDING):  amLODIPine   Tablet 5 milliGRAM(s) Oral daily  apixaban 5 milliGRAM(s) Oral two times a day  atorvastatin 20 milliGRAM(s) Oral at bedtime  clopidogrel Tablet 75 milliGRAM(s) Oral daily  enalapril 10 milliGRAM(s) Oral daily  sodium chloride 0.9%. 1000 milliLiter(s) (150 mL/Hr) IV Continuous <Continuous>  tamsulosin 0.4 milliGRAM(s) Oral at bedtime    MEDICATIONS  (PRN):  acetaminophen     Tablet .. 650 milliGRAM(s) Oral every 6 hours PRN Temp greater or equal to 38C (100.4F)  benzonatate 100 milliGRAM(s) Oral three times a day PRN Cough      LABS:                        12.1   8.59  )-----------( 125      ( 18 Jan 2022 06:48 )             36.9       01-18    134<L>  |  105  |  34<H>  ----------------------------<  110<H>  4.3   |  18<L>  |  1.31<H>    Ca    7.7<L>      18 Jan 2022 06:48  Mg     2.7     01-18    TPro  5.7<L>  /  Alb  2.8<L>  /  TBili  1.2  /  DBili  x   /  AST  111<H>  /  ALT  49<H>  /  AlkPhos  45  01-17      RADIOLOGY & ADDITIONAL TESTS: Reviewed.

## 2022-01-18 NOTE — PROGRESS NOTE ADULT - PROBLEM SELECTOR PLAN 7
-LDL 45  - Cont. Atorva 40 (TIC for Crestor)      DVT: Eliquis   Dispo: pending clinical progression  PT rec SHEILA    Case d/w Dr. Arnold -LDL 45  - Cont. Atorva 40 (TIC for Crestor)      DVT: Eliquis   Dispo: transfer to medicine  PT rec SHEILA    Case d/w Dr. Arnold

## 2022-01-18 NOTE — PROGRESS NOTE ADULT - SUBJECTIVE AND OBJECTIVE BOX
OVERNIGHT EVENTS: JOANNA     SUBJECTIVE / INTERVAL HPI: Patient seen and examined at bedside. Patient pleasant, intermittently forgetful and confused during interview. Had a cough during exam, but otherwise denied any new complaints.     VITAL SIGNS:  Vital Signs Last 24 Hrs  T(C): 38.9 (18 Jan 2022 09:42), Max: 39.1 (17 Jan 2022 21:23)  T(F): 102 (18 Jan 2022 09:42), Max: 102.4 (17 Jan 2022 21:23)  HR: 71 (18 Jan 2022 09:42) (58 - 96)  BP: 132/73 (18 Jan 2022 09:42) (97/52 - 146/63)  BP(mean): --  RR: 16 (18 Jan 2022 09:42) (16 - 19)  SpO2: 94% (18 Jan 2022 09:42) (88% - 97%)    PHYSICAL EXAM:    - Mental Status:  Alert, awake, oriented to person, place ([address] [place] [address], did not recall name of hospital), off on date. Intermittently confused and forgetful.   - Cranial Nerves II-XII:    II: III, IV, VI:  KAI, Extraocular movements are intact without nystagmus.  VII:  Face is symmetric with normal eye closure and smile  - Motor:    Strength   Normal muscle bulk and tone throughout.    MEDICATIONS:  MEDICATIONS  (STANDING):  amLODIPine   Tablet 5 milliGRAM(s) Oral daily  apixaban 5 milliGRAM(s) Oral two times a day  atorvastatin 20 milliGRAM(s) Oral at bedtime  clopidogrel Tablet 75 milliGRAM(s) Oral daily  dexAMETHasone  Injectable 6 milliGRAM(s) IV Push every 24 hours  enalapril 10 milliGRAM(s) Oral daily  remdesivir  IVPB   IV Intermittent   senna 2 Tablet(s) Oral at bedtime  tamsulosin 0.4 milliGRAM(s) Oral at bedtime    MEDICATIONS  (PRN):  acetaminophen     Tablet .. 650 milliGRAM(s) Oral every 6 hours PRN Temp greater or equal to 38C (100.4F)  benzonatate 100 milliGRAM(s) Oral three times a day PRN Cough      ALLERGIES:  Allergies    No Known Allergies    Intolerances        LABS:                        12.1   8.59  )-----------( 125      ( 18 Jan 2022 06:48 )             36.9     01-18    134<L>  |  105  |  34<H>  ----------------------------<  110<H>  4.3   |  18<L>  |  1.31<H>    Ca    7.7<L>      18 Jan 2022 06:48  Mg     2.7     01-18    TPro  5.7<L>  /  Alb  2.8<L>  /  TBili  1.2  /  DBili  x   /  AST  111<H>  /  ALT  49<H>  /  AlkPhos  45  01-17        CAPILLARY BLOOD GLUCOSE          RADIOLOGY & ADDITIONAL TESTS: Reviewed.

## 2022-01-18 NOTE — PROGRESS NOTE ADULT - ATTENDING COMMENTS
Patient seen and examined (limited given COVID pandemic). Case discussed with ACP    89M dementia HTN colon ca prior carotid artery stents, OA, hx of imbalance per notes who presented after falls. Pt not sure why he is here. Denies any complaints. Found to be covid + and in afib with low rates. Elevated trop, CK. On tele with irregular rhythm. Pt's wife at home also has been unwell. No other support system. Chest pain free. Trop downtrending (0.15, 0.11, 0.11). No sign valvular murmurs on exam.     Course c/b O2 requirement and recurrent fevers    Evaluated by medicine -- planned for transfer to medicine service for worsening COVID infection     No significant arrhythmias that could have caused syncope on tele monitoring  Appreciate neuro and vascular consults-- per vascular carotid Doppler without abnormality that would explain syncope  pending TTE  for presumed new afib, favor a/c with apixaban given high chads vasc and stopping asa (continue clopidogrel) -- depending on discharge plan and fall risk after discharge, continuation of anticoag can be reconsidered   trops elevated but downtrending, no chest pain: low suspicion for ACS, already on anticoag, no BB given low HRs, already on statin, pending TTE  BP better controlled -- hold meds if lower BP in setting of worsening infection   Continue clopidogrel given prior vascular stent, statin  PT, SW eval given home situation     Isaac Enamorado MD

## 2022-01-18 NOTE — PROGRESS NOTE ADULT - ASSESSMENT
Patient is a 89 y/o M, POOR HISTORIAN, with PMHx of Dementia (A & O x 1-2 self/place), HTN, colon cancer 2012 (s/p left hemicolectomy - chemoport in place), bilateral carotid artery stenosis (s/p stents 2019), osteoarthritis of hands, hx of imbalance , who presented to [facility] ED BIBEMS on 1/15/22 after fall from bed, admitted for syncopal workup, found to be COVID+.    Patient is a 89 y/o M, POOR HISTORIAN, with PMHx of Dementia (A & O x 1-2 self/place), HTN, colon cancer 2012 (s/p left hemicolectomy - chemoport in place), bilateral carotid artery stenosis (s/p stents 2019), osteoarthritis of hands, hx of imbalance , who presented to [facility] ED BIBEMS on 1/15/22 after fall from bed, admitted for syncopal workup (negative), found to be COVID+ on admissio. On 1/18 pt noted to be hypoxic high 80s on RA, started on decadron and RDV and transferred to medicine for acute hypoxic respiratory failure in setting of COVID pneumonia.

## 2022-01-18 NOTE — CONSULT NOTE ADULT - SUBJECTIVE AND OBJECTIVE BOX
**Incomplete Note**    CRISS GARCIA  89y  Male      Patient is a 89y old  Male who presents with a chief complaint of Fall (18 Jan 2022 08:52)        PAST MEDICAL/SURGICAL HISTORY  PAST MEDICAL & SURGICAL HISTORY:  HTN (hypertension)    High cholesterol    BPH (benign prostatic hyperplasia)    Colon cancer    H/O hemicolectomy    Presence of internal carotid stent        REVIEW OF SYSTEMS:  CONSTITUTIONAL: No fever, weight loss, or fatigue  EYES: No eye pain, visual disturbances, or discharge  ENMT:  No difficulty hearing, tinnitus, vertigo; No sinus or throat pain  NECK: No pain or stiffness  BREASTS: No pain, masses, or nipple discharge  RESPIRATORY: No cough, wheezing, chills or hemoptysis; No shortness of breath  CARDIOVASCULAR: No chest pain, palpitations, dizziness, or leg swelling  GASTROINTESTINAL: No abdominal or epigastric pain. No nausea, vomiting, or hematemesis; No diarrhea or constipation. No melena or hematochezia.  GENITOURINARY: No dysuria, frequency, hematuria, or incontinence  NEUROLOGICAL: No headaches, memory loss, loss of strength, numbness, or tremors  SKIN: No itching, burning, rashes, or lesions   LYMPH NODES: No enlarged glands  ENDOCRINE: No heat or cold intolerance; No hair loss  MUSCULOSKELETAL: No joint pain or swelling; No muscle, back, or extremity pain  PSYCHIATRIC: No depression, anxiety, mood swings, or difficulty sleeping  HEME/LYMPH: No easy bruising, or bleeding gums  ALLERY AND IMMUNOLOGIC: No hives or eczema    T(C): 36.8 (01-18-22 @ 06:09), Max: 39.1 (01-17-22 @ 21:23)  HR: 62 (01-18-22 @ 08:14) (58 - 96)  BP: 110/57 (01-18-22 @ 08:14) (97/52 - 146/63)  RR: 18 (01-18-22 @ 09:07) (16 - 19)  SpO2: 88% (01-18-22 @ 09:07) (88% - 97%)  Wt(kg): --Vital Signs Last 24 Hrs  T(C): 36.8 (18 Jan 2022 06:09), Max: 39.1 (17 Jan 2022 21:23)  T(F): 98.3 (18 Jan 2022 06:09), Max: 102.4 (17 Jan 2022 21:23)  HR: 62 (18 Jan 2022 08:14) (58 - 96)  BP: 110/57 (18 Jan 2022 08:14) (97/52 - 146/63)  BP(mean): --  RR: 18 (18 Jan 2022 09:07) (16 - 19)  SpO2: 88% (18 Jan 2022 09:07) (88% - 97%)    PHYSICAL EXAM:  GENERAL: NAD, well-groomed, well-developed  HEAD:  Atraumatic, Normocephalic  EYES: EOMI, PERRLA, conjunctiva and sclera clear  ENMT: No tonsillar erythema, exudates, or enlargement; Moist mucous membranes, Good dentition, No lesions  NECK: Supple, No JVD, Normal thyroid  NERVOUS SYSTEM:  Alert & Oriented X3, Good concentration; Motor Strength 5/5 B/L upper and lower extremities; DTRs 2+ intact and symmetric  CHEST/LUNG: Clear to percussion bilaterally; No rales, rhonchi, wheezing, or rubs  HEART: Regular rate and rhythm; No murmurs, rubs, or gallops  ABDOMEN: Soft, Nontender, Nondistended; Bowel sounds present  EXTREMITIES:  2+ Peripheral Pulses, No clubbing, cyanosis, or edema  LYMPH: No lymphadenopathy noted  SKIN: No rashes or lesions    Consultant(s) Notes Reviewed:  [x ] YES  [ ] NO  Care Discussed with Consultants/Other Providers [ x] YES  [ ] NO    LABS:  CBC   01-18-22 @ 06:48  Hematcorit 36.9  Hemoglobin 12.1  Mean Cell Hemoglobin 32.2  Platelet Count-Automated 125  RBC Count 3.76  Red Cell Distrib Width 14.2  Wbc Count 8.59      BMP  01-18-22 @ 06:48  Anion Gap. Serum 11  Blood Urea Nitrogen,Serm 34  Calcium, Total Serum 7.7  Carbon Dioxide, Serum 18  Chloride, Serum 105  Creatinine, Serum 1.31  eGFR in  55  eGFR in Non Afican American 47  Gloucose, serum 110  Potassium, Serum 4.3  Sodium, Serum 134              01-17-22 @ 06:41  Anion Gap. Serum 12  Blood Urea Nitrogen,Serm 35  Calcium, Total Serum 7.5  Carbon Dioxide, Serum 17  Chloride, Serum 102  Creatinine, Serum 1.36  eGFR in  52  eGFR in Non Afican American 45  Gloucose, serum 112  Potassium, Serum 3.9  Sodium, Serum 131              01-16-22 @ 06:57  Anion Gap. Serum 15  Blood Urea Nitrogen,Serm 32  Calcium, Total Serum 8.4  Carbon Dioxide, Serum 18  Chloride, Serum 102  Creatinine, Serum 1.35  eGFR in  53  eGFR in Non Afican American 46  Gloucose, serum 81  Potassium, Serum 4.1  Sodium, Serum 135              01-15-22 @ 18:35  Anion Gap. Serum 18  Blood Urea Nitrogen,Serm 32  Calcium, Total Serum 8.8  Carbon Dioxide, Serum 19  Chloride, Serum 101  Creatinine, Serum 1.20  eGFR in  61  eGFR in Non Afican American 53  Gloucose, serum 69  Potassium, Serum 4.1  Sodium, Serum 138              01-15-22 @ 10:33  Anion Gap. Serum 18  Blood Urea Nitrogen,Serm 31  Calcium, Total Serum 8.5  Carbon Dioxide, Serum 14  Chloride, Serum 106  Creatinine, Serum 1.33  eGFR in  54  eGFR in Non Afican American 46  Gloucose, serum 110  Potassium, Serum 4.6  Sodium, Serum 138                  CMP  01-18-22 @ 06:48  Jacquie Aminotransferase(ALT/SGPT)--  Albumin, Serum --  Alkaline Phosphatase, Serum --  Anion Gap, Serum 11  Aspartate Aminotransferase (AST/SGOT)--  Bilirubin Total, Serum --  Blood Urea Nitrogen, Serum 34  Calcium,Total Serum 7.7  Carbon Dioxide, Serum 18  Chloride, Serum 105  Creatinine, Serum 1.31  eGFR if  55  eGFR if Non African American 47  Glucose, Serum 110  Potassium, Serum 4.3  Protein Total, Serum --  Sodium, Serum 134                      01-17-22 @ 06:41  Jacquie Aminotransferase(ALT/SGPT)49  Albumin, Serum 2.8  Alkaline Phosphatase, Serum 45  Anion Gap, Serum 12  Aspartate Aminotransferase (AST/SGOT)111  Bilirubin Total, Serum 1.2  Blood Urea Nitrogen, Serum 35  Calcium,Total Serum 7.5  Carbon Dioxide, Serum 17  Chloride, Serum 102  Creatinine, Serum 1.36  eGFR if  52  eGFR if Non African American 45  Glucose, Serum 112  Potassium, Serum 3.9  Protein Total, Serum 5.7  Sodium, Serum 131                      01-16-22 @ 06:57  Jacquie Aminotransferase(ALT/SGPT)44  Albumin, Serum 3.1  Alkaline Phosphatase, Serum 45  Anion Gap, Serum 15  Aspartate Aminotransferase (AST/SGOT)145  Bilirubin Total, Serum 1.2  Blood Urea Nitrogen, Serum 32  Calcium,Total Serum 8.4  Carbon Dioxide, Serum 18  Chloride, Serum 102  Creatinine, Serum 1.35  eGFR if  53  eGFR if Non African American 46  Glucose, Serum 81  Potassium, Serum 4.1  Protein Total, Serum 6.0  Sodium, Serum 135                      01-15-22 @ 18:35  Jacquie Aminotransferase(ALT/SGPT)--  Albumin, Serum --  Alkaline Phosphatase, Serum --  Anion Gap, Serum 18  Aspartate Aminotransferase (AST/SGOT)--  Bilirubin Total, Serum --  Blood Urea Nitrogen, Serum 32  Calcium,Total Serum 8.8  Carbon Dioxide, Serum 19  Chloride, Serum 101  Creatinine, Serum 1.20  eGFR if  61  eGFR if Non African American 53  Glucose, Serum 69  Potassium, Serum 4.1  Protein Total, Serum --  Sodium, Serum 138                      01-15-22 @ 10:33  Jacquie Aminotransferase(ALT/SGPT)--  Albumin, Serum --  Alkaline Phosphatase, Serum --  Anion Gap, Serum 18  Aspartate Aminotransferase (AST/SGOT)--  Bilirubin Total, Serum --  Blood Urea Nitrogen, Serum 31  Calcium,Total Serum 8.5  Carbon Dioxide, Serum 14  Chloride, Serum 106  Creatinine, Serum 1.33  eGFR if  54  eGFR if Non African American 46  Glucose, Serum 110  Potassium, Serum 4.6  Protein Total, Serum --  Sodium, Serum 138                          PT/INR      Amylase/Lipase            RADIOLOGY & ADDITIONAL TESTS:    Imaging Personally Reviewed:  [ ] YES  [ ] NO **Incomplete Note**    CRISS GARCIA    89 y/o M, POOR HISTORIAN, with PMHx of Dementia (A & O x 1-2 self/place), HTN, colon cancer 2012 (s/p left hemicolectomy - chemoport in place), bilateral carotid artery stenosis (s/p stents 2019), osteoarthritis of hands, and hx of gait instability who presented to [facility] ED BIBEMS on 1/15/22 after fall from bed, admitted for syncopal workup, found to be COVID+.     Interval hx/Events: Pt seen and examined at bedside, NAD, ROS per above.      PAST MEDICAL/SURGICAL HISTORY  PAST MEDICAL & SURGICAL HISTORY:  HTN (hypertension)    High cholesterol    BPH (benign prostatic hyperplasia)    Colon cancer    H/O hemicolectomy    Presence of internal carotid stent        REVIEW OF SYSTEMS:  per HPI      T(C): 36.8 (01-18-22 @ 06:09), Max: 39.1 (01-17-22 @ 21:23)  HR: 62 (01-18-22 @ 08:14) (58 - 96)  BP: 110/57 (01-18-22 @ 08:14) (97/52 - 146/63)  RR: 18 (01-18-22 @ 09:07) (16 - 19)  SpO2: 88% (01-18-22 @ 09:07) (88% - 97%)  Wt(kg): --Vital Signs Last 24 Hrs  T(C): 36.8 (18 Jan 2022 06:09), Max: 39.1 (17 Jan 2022 21:23)  T(F): 98.3 (18 Jan 2022 06:09), Max: 102.4 (17 Jan 2022 21:23)  HR: 62 (18 Jan 2022 08:14) (58 - 96)  BP: 110/57 (18 Jan 2022 08:14) (97/52 - 146/63)  BP(mean): --  RR: 18 (18 Jan 2022 09:07) (16 - 19)  SpO2: 88% (18 Jan 2022 09:07) (88% - 97%)    PHYSICAL EXAM:  GENERAL: NAD, well-groomed, well-developed  HEAD:  Atraumatic, Normocephalic  EYES: EOMI, PERRLA, conjunctiva and sclera clear  ENMT: No tonsillar erythema, exudates, or enlargement; Moist mucous membranes, Good dentition, No lesions  NECK: Supple, No JVD, Normal thyroid  NERVOUS SYSTEM:  Alert & Oriented X3, Good concentration; Motor Strength 5/5 B/L upper and lower extremities; DTRs 2+ intact and symmetric  CHEST/LUNG: Clear to percussion bilaterally; No rales, rhonchi, wheezing, or rubs  HEART: Regular rate and rhythm; No murmurs, rubs, or gallops  ABDOMEN: Soft, Nontender, Nondistended; Bowel sounds present  EXTREMITIES:  2+ Peripheral Pulses, No clubbing, cyanosis, or edema  LYMPH: No lymphadenopathy noted  SKIN: No rashes or lesions    Consultant(s) Notes Reviewed:  [x ] YES  [ ] NO  Care Discussed with Consultants/Other Providers [ x] YES  [ ] NO    LABS:  CBC   01-18-22 @ 06:48  Hematcorit 36.9  Hemoglobin 12.1  Mean Cell Hemoglobin 32.2  Platelet Count-Automated 125  RBC Count 3.76  Red Cell Distrib Width 14.2  Wbc Count 8.59      BMP  01-18-22 @ 06:48  Anion Gap. Serum 11  Blood Urea Nitrogen,Serm 34  Calcium, Total Serum 7.7  Carbon Dioxide, Serum 18  Chloride, Serum 105  Creatinine, Serum 1.31  eGFR in  55  eGFR in Non Afican American 47  Gloucose, serum 110  Potassium, Serum 4.3  Sodium, Serum 134              01-17-22 @ 06:41  Anion Gap. Serum 12  Blood Urea Nitrogen,Serm 35  Calcium, Total Serum 7.5  Carbon Dioxide, Serum 17  Chloride, Serum 102  Creatinine, Serum 1.36  eGFR in  52  eGFR in Non Afican American 45  Gloucose, serum 112  Potassium, Serum 3.9  Sodium, Serum 131              01-16-22 @ 06:57  Anion Gap. Serum 15  Blood Urea Nitrogen,Serm 32  Calcium, Total Serum 8.4  Carbon Dioxide, Serum 18  Chloride, Serum 102  Creatinine, Serum 1.35  eGFR in  53  eGFR in Non Afican American 46  Gloucose, serum 81  Potassium, Serum 4.1  Sodium, Serum 135              01-15-22 @ 18:35  Anion Gap. Serum 18  Blood Urea Nitrogen,Serm 32  Calcium, Total Serum 8.8  Carbon Dioxide, Serum 19  Chloride, Serum 101  Creatinine, Serum 1.20  eGFR in  61  eGFR in Non Afican American 53  Gloucose, serum 69  Potassium, Serum 4.1  Sodium, Serum 138              01-15-22 @ 10:33  Anion Gap. Serum 18  Blood Urea Nitrogen,Serm 31  Calcium, Total Serum 8.5  Carbon Dioxide, Serum 14  Chloride, Serum 106  Creatinine, Serum 1.33  eGFR in  54  eGFR in Non Afican American 46  Gloucose, serum 110  Potassium, Serum 4.6  Sodium, Serum 138                  CMP  01-18-22 @ 06:48  Jacquie Aminotransferase(ALT/SGPT)--  Albumin, Serum --  Alkaline Phosphatase, Serum --  Anion Gap, Serum 11  Aspartate Aminotransferase (AST/SGOT)--  Bilirubin Total, Serum --  Blood Urea Nitrogen, Serum 34  Calcium,Total Serum 7.7  Carbon Dioxide, Serum 18  Chloride, Serum 105  Creatinine, Serum 1.31  eGFR if  55  eGFR if Non African American 47  Glucose, Serum 110  Potassium, Serum 4.3  Protein Total, Serum --  Sodium, Serum 134                      01-17-22 @ 06:41  Jacquie Aminotransferase(ALT/SGPT)49  Albumin, Serum 2.8  Alkaline Phosphatase, Serum 45  Anion Gap, Serum 12  Aspartate Aminotransferase (AST/SGOT)111  Bilirubin Total, Serum 1.2  Blood Urea Nitrogen, Serum 35  Calcium,Total Serum 7.5  Carbon Dioxide, Serum 17  Chloride, Serum 102  Creatinine, Serum 1.36  eGFR if  52  eGFR if Non African American 45  Glucose, Serum 112  Potassium, Serum 3.9  Protein Total, Serum 5.7  Sodium, Serum 131                      01-16-22 @ 06:57  Jacquie Aminotransferase(ALT/SGPT)44  Albumin, Serum 3.1  Alkaline Phosphatase, Serum 45  Anion Gap, Serum 15  Aspartate Aminotransferase (AST/SGOT)145  Bilirubin Total, Serum 1.2  Blood Urea Nitrogen, Serum 32  Calcium,Total Serum 8.4  Carbon Dioxide, Serum 18  Chloride, Serum 102  Creatinine, Serum 1.35  eGFR if  53  eGFR if Non African American 46  Glucose, Serum 81  Potassium, Serum 4.1  Protein Total, Serum 6.0  Sodium, Serum 135                      01-15-22 @ 18:35  Jacquie Aminotransferase(ALT/SGPT)--  Albumin, Serum --  Alkaline Phosphatase, Serum --  Anion Gap, Serum 18  Aspartate Aminotransferase (AST/SGOT)--  Bilirubin Total, Serum --  Blood Urea Nitrogen, Serum 32  Calcium,Total Serum 8.8  Carbon Dioxide, Serum 19  Chloride, Serum 101  Creatinine, Serum 1.20  eGFR if  61  eGFR if Non African American 53  Glucose, Serum 69  Potassium, Serum 4.1  Protein Total, Serum --  Sodium, Serum 138                      01-15-22 @ 10:33  Jacquie Aminotransferase(ALT/SGPT)--  Albumin, Serum --  Alkaline Phosphatase, Serum --  Anion Gap, Serum 18  Aspartate Aminotransferase (AST/SGOT)--  Bilirubin Total, Serum --  Blood Urea Nitrogen, Serum 31  Calcium,Total Serum 8.5  Carbon Dioxide, Serum 14  Chloride, Serum 106  Creatinine, Serum 1.33  eGFR if  54  eGFR if Non African American 46  Glucose, Serum 110  Potassium, Serum 4.6  Protein Total, Serum --  Sodium, Serum 138                          PT/INR      Amylase/Lipase            RADIOLOGY & ADDITIONAL TESTS:    Imaging Personally Reviewed:  [ ] YES  [ ] NO CRISS GARCIA    Hospital Course: 89 y/o M, POOR HISTORIAN, with PMHx of Dementia (A & O x 1-2 self/place), HTN, colon cancer 2012 (s/p left hemicolectomy - chemoport in place), bilateral carotid artery stenosis (s/p stents 2019), osteoarthritis of hands, and hx of gait instability who presented to [facility] ED BIBEMS on 1/15/22 after fall from bed, admitted for syncopal workup, found to be COVID+. Neurology was consulted for worsening confusion with negative VEEGn thus far. His ischemic eval has been unremarkable pending TTE, his course was further complicated by acute hypoxemic respiratory failure 2/2 COVID-19 PNA now requiring supplemental 02. On ROS, patient c/o non productive cough unsure of temporarily however atient denies: fevers, chills, myalgias, dizziness, weakness, HA, dysphagia, dysarthria, changes in vision, CP/chest discomfort, palpitations, SOB, PND, orthopnea, N/V/D/C, abdominal pain, dysuria, urinary urgency/increased frequency, changes in bowel movements, melena, hematochezia, LE edema, joint pain, or unintentional weightloss. ROS otherwise negative.    Interval hx/Events: Pt seen and examined at bedside, NAD, ROS per above.      PAST MEDICAL/SURGICAL HISTORY  PAST MEDICAL & SURGICAL HISTORY:  HTN (hypertension)    High cholesterol    BPH (benign prostatic hyperplasia)    Colon cancer    H/O hemicolectomy    Presence of internal carotid stent        REVIEW OF SYSTEMS:  per HPI      T(C): 36.8 (01-18-22 @ 06:09), Max: 39.1 (01-17-22 @ 21:23)  HR: 62 (01-18-22 @ 08:14) (58 - 96)  BP: 110/57 (01-18-22 @ 08:14) (97/52 - 146/63)  RR: 18 (01-18-22 @ 09:07) (16 - 19)  SpO2: 88% (01-18-22 @ 09:07) (88% - 97%)  Wt(kg): --Vital Signs Last 24 Hrs  T(C): 36.8 (18 Jan 2022 06:09), Max: 39.1 (17 Jan 2022 21:23)  T(F): 98.3 (18 Jan 2022 06:09), Max: 102.4 (17 Jan 2022 21:23)  HR: 62 (18 Jan 2022 08:14) (58 - 96)  BP: 110/57 (18 Jan 2022 08:14) (97/52 - 146/63)  BP(mean): --  RR: 18 (18 Jan 2022 09:07) (16 - 19)  SpO2: 88% (18 Jan 2022 09:07) (88% - 97%)    PHYSICAL EXAM:  GENERAL: NAD, well-groomed, well-developed  HEAD:  Atraumatic, Normocephalic  EYES: EOMI, PERRLA, conjunctiva and sclera clear  ENMT: No tonsillar erythema, exudates, or enlargement; Moist mucous membranes, Good dentition, No lesions  NECK: Supple, No JVD, Normal thyroid  NERVOUS SYSTEM:  Alert & Oriented X3, Good concentration; Motor Strength 5/5 B/L upper and lower extremities; DTRs 2+ intact and symmetric  CHEST/LUNG: Clear to percussion bilaterally; No rales, rhonchi, wheezing, or rubs  HEART: Regular rate and rhythm; No murmurs, rubs, or gallops  ABDOMEN: Soft, Nontender, Nondistended; Bowel sounds present  EXTREMITIES:  2+ Peripheral Pulses, No clubbing, cyanosis, or edema  LYMPH: No lymphadenopathy noted  SKIN: No rashes or lesions    Consultant(s) Notes Reviewed:  [x ] YES  [ ] NO  Care Discussed with Consultants/Other Providers [ x] YES  [ ] NO    LABS:  CBC   01-18-22 @ 06:48  Hematcorit 36.9  Hemoglobin 12.1  Mean Cell Hemoglobin 32.2  Platelet Count-Automated 125  RBC Count 3.76  Red Cell Distrib Width 14.2  Wbc Count 8.59      BMP  01-18-22 @ 06:48  Anion Gap. Serum 11  Blood Urea Nitrogen,Serm 34  Calcium, Total Serum 7.7  Carbon Dioxide, Serum 18  Chloride, Serum 105  Creatinine, Serum 1.31  eGFR in  55  eGFR in Non Afican American 47  Gloucose, serum 110  Potassium, Serum 4.3  Sodium, Serum 134              01-17-22 @ 06:41  Anion Gap. Serum 12  Blood Urea Nitrogen,Serm 35  Calcium, Total Serum 7.5  Carbon Dioxide, Serum 17  Chloride, Serum 102  Creatinine, Serum 1.36  eGFR in  52  eGFR in Non Afican American 45  Gloucose, serum 112  Potassium, Serum 3.9  Sodium, Serum 131              01-16-22 @ 06:57  Anion Gap. Serum 15  Blood Urea Nitrogen,Serm 32  Calcium, Total Serum 8.4  Carbon Dioxide, Serum 18  Chloride, Serum 102  Creatinine, Serum 1.35  eGFR in  53  eGFR in Non Afican American 46  Gloucose, serum 81  Potassium, Serum 4.1  Sodium, Serum 135              01-15-22 @ 18:35  Anion Gap. Serum 18  Blood Urea Nitrogen,Serm 32  Calcium, Total Serum 8.8  Carbon Dioxide, Serum 19  Chloride, Serum 101  Creatinine, Serum 1.20  eGFR in  61  eGFR in Non Afican American 53  Gloucose, serum 69  Potassium, Serum 4.1  Sodium, Serum 138              01-15-22 @ 10:33  Anion Gap. Serum 18  Blood Urea Nitrogen,Serm 31  Calcium, Total Serum 8.5  Carbon Dioxide, Serum 14  Chloride, Serum 106  Creatinine, Serum 1.33  eGFR in  54  eGFR in Non Afican American 46  Gloucose, serum 110  Potassium, Serum 4.6  Sodium, Serum 138                  CMP  01-18-22 @ 06:48  Jacquie Aminotransferase(ALT/SGPT)--  Albumin, Serum --  Alkaline Phosphatase, Serum --  Anion Gap, Serum 11  Aspartate Aminotransferase (AST/SGOT)--  Bilirubin Total, Serum --  Blood Urea Nitrogen, Serum 34  Calcium,Total Serum 7.7  Carbon Dioxide, Serum 18  Chloride, Serum 105  Creatinine, Serum 1.31  eGFR if  55  eGFR if Non African American 47  Glucose, Serum 110  Potassium, Serum 4.3  Protein Total, Serum --  Sodium, Serum 134                      01-17-22 @ 06:41  Jacquie Aminotransferase(ALT/SGPT)49  Albumin, Serum 2.8  Alkaline Phosphatase, Serum 45  Anion Gap, Serum 12  Aspartate Aminotransferase (AST/SGOT)111  Bilirubin Total, Serum 1.2  Blood Urea Nitrogen, Serum 35  Calcium,Total Serum 7.5  Carbon Dioxide, Serum 17  Chloride, Serum 102  Creatinine, Serum 1.36  eGFR if  52  eGFR if Non African American 45  Glucose, Serum 112  Potassium, Serum 3.9  Protein Total, Serum 5.7  Sodium, Serum 131                      01-16-22 @ 06:57  Jacquie Aminotransferase(ALT/SGPT)44  Albumin, Serum 3.1  Alkaline Phosphatase, Serum 45  Anion Gap, Serum 15  Aspartate Aminotransferase (AST/SGOT)145  Bilirubin Total, Serum 1.2  Blood Urea Nitrogen, Serum 32  Calcium,Total Serum 8.4  Carbon Dioxide, Serum 18  Chloride, Serum 102  Creatinine, Serum 1.35  eGFR if  53  eGFR if Non African American 46  Glucose, Serum 81  Potassium, Serum 4.1  Protein Total, Serum 6.0  Sodium, Serum 135                      01-15-22 @ 18:35  Jacquie Aminotransferase(ALT/SGPT)--  Albumin, Serum --  Alkaline Phosphatase, Serum --  Anion Gap, Serum 18  Aspartate Aminotransferase (AST/SGOT)--  Bilirubin Total, Serum --  Blood Urea Nitrogen, Serum 32  Calcium,Total Serum 8.8  Carbon Dioxide, Serum 19  Chloride, Serum 101  Creatinine, Serum 1.20  eGFR if  61  eGFR if Non African American 53  Glucose, Serum 69  Potassium, Serum 4.1  Protein Total, Serum --  Sodium, Serum 138                      01-15-22 @ 10:33  Jacquie Aminotransferase(ALT/SGPT)--  Albumin, Serum --  Alkaline Phosphatase, Serum --  Anion Gap, Serum 18  Aspartate Aminotransferase (AST/SGOT)--  Bilirubin Total, Serum --  Blood Urea Nitrogen, Serum 31  Calcium,Total Serum 8.5  Carbon Dioxide, Serum 14  Chloride, Serum 106  Creatinine, Serum 1.33  eGFR if  54  eGFR if Non African American 46  Glucose, Serum 110  Potassium, Serum 4.6  Protein Total, Serum --  Sodium, Serum 138                          PT/INR      Amylase/Lipase            RADIOLOGY & ADDITIONAL TESTS:    Imaging Personally Reviewed:  [ ] YES  [ ] NO CRISS GARCIA    Hospital Course: 89 y/o M, POOR HISTORIAN, with PMHx of Dementia (A & O x 1-2 self/place), HTN, colon cancer 2012 (s/p left hemicolectomy - chemoport in place), bilateral carotid artery stenosis (s/p stents 2019), osteoarthritis of hands, and hx of gait instability who presented to [facility] ED BIBEMS on 1/15/22 after fall from bed, admitted for syncopal workup, found to be COVID+. Neurology was consulted for worsening confusion with negative VEEGn thus far. His ischemic eval has been unremarkable pending TTE, his course was further complicated by acute hypoxemic respiratory failure 2/2 COVID-19 PNA now requiring supplemental 02. On ROS, patient c/o non productive cough unsure of temporarily however atient denies: fevers, chills, myalgias, dizziness, weakness, HA, dysphagia, dysarthria, changes in vision, CP/chest discomfort, palpitations, SOB, PND, orthopnea, N/V/D/C, abdominal pain, dysuria, urinary urgency/increased frequency, changes in bowel movements, melena, hematochezia, LE edema, joint pain, or unintentional weightloss. ROS otherwise negative.    Interval hx/Events: Pt seen and examined at bedside, NAD, ROS per above.      PAST MEDICAL/SURGICAL HISTORY  PAST MEDICAL & SURGICAL HISTORY:  HTN (hypertension)    High cholesterol    BPH (benign prostatic hyperplasia)    Colon cancer    H/O hemicolectomy    Presence of internal carotid stent        REVIEW OF SYSTEMS:  per HPI      T(C): 36.8 (01-18-22 @ 06:09), Max: 39.1 (01-17-22 @ 21:23)  HR: 62 (01-18-22 @ 08:14) (58 - 96)  BP: 110/57 (01-18-22 @ 08:14) (97/52 - 146/63)  RR: 18 (01-18-22 @ 09:07) (16 - 19)  SpO2: 88% (01-18-22 @ 09:07) (88% - 97%)  Wt(kg): --Vital Signs Last 24 Hrs  T(C): 36.8 (18 Jan 2022 06:09), Max: 39.1 (17 Jan 2022 21:23)  T(F): 98.3 (18 Jan 2022 06:09), Max: 102.4 (17 Jan 2022 21:23)  HR: 62 (18 Jan 2022 08:14) (58 - 96)  BP: 110/57 (18 Jan 2022 08:14) (97/52 - 146/63)  BP(mean): --  RR: 18 (18 Jan 2022 09:07) (16 - 19)  SpO2: 88% (18 Jan 2022 09:07) (88% - 97%)    PHYSICAL EXAM:  GENERAL: elderly gentleman, NAD, well-groomed, well-developed on 4L NC  HEAD:  Atraumatic, Normocephalic  EYES: EOMI, PERRLA, conjunctiva and sclera clear  ENMT: No tonsillar erythema, exudates, or enlargement; Moist mucous membranes, Good dentition, No lesions  NECK: Supple, No JVD, Normal thyroid  NERVOUS SYSTEM:  Alert & Oriented X2 (self, place), poor concentration and insight; Motor Strength 5/5 B/L upper and lower extremities; DTRs 2+ intact and symmetric  CHEST/LUNG: no accessory muscle use or increased WOB, bibasilar insp crackles, no egophony/rhonchi  HEART: Regular rate and rhythm; No murmurs, rubs, or gallops  ABDOMEN: Soft, Nontender, Nondistended; Bowel sounds present  EXTREMITIES:  2+ Peripheral Pulses, No clubbing, cyanosis, or edema  LYMPH: No lymphadenopathy noted  SKIN: No rashes or lesions    Consultant(s) Notes Reviewed:  [x ] YES  [ ] NO  Care Discussed with Consultants/Other Providers [ x] YES  [ ] NO    LABS:  CBC   01-18-22 @ 06:48  Hematcorit 36.9  Hemoglobin 12.1  Mean Cell Hemoglobin 32.2  Platelet Count-Automated 125  RBC Count 3.76  Red Cell Distrib Width 14.2  Wbc Count 8.59      BMP  01-18-22 @ 06:48  Anion Gap. Serum 11  Blood Urea Nitrogen,Serm 34  Calcium, Total Serum 7.7  Carbon Dioxide, Serum 18  Chloride, Serum 105  Creatinine, Serum 1.31  eGFR in  55  eGFR in Non Afican American 47  Gloucose, serum 110  Potassium, Serum 4.3  Sodium, Serum 134              01-17-22 @ 06:41  Anion Gap. Serum 12  Blood Urea Nitrogen,Serm 35  Calcium, Total Serum 7.5  Carbon Dioxide, Serum 17  Chloride, Serum 102  Creatinine, Serum 1.36  eGFR in  52  eGFR in Non Afican American 45  Gloucose, serum 112  Potassium, Serum 3.9  Sodium, Serum 131              01-16-22 @ 06:57  Anion Gap. Serum 15  Blood Urea Nitrogen,Serm 32  Calcium, Total Serum 8.4  Carbon Dioxide, Serum 18  Chloride, Serum 102  Creatinine, Serum 1.35  eGFR in  53  eGFR in Non Afican American 46  Gloucose, serum 81  Potassium, Serum 4.1  Sodium, Serum 135              01-15-22 @ 18:35  Anion Gap. Serum 18  Blood Urea Nitrogen,Serm 32  Calcium, Total Serum 8.8  Carbon Dioxide, Serum 19  Chloride, Serum 101  Creatinine, Serum 1.20  eGFR in  61  eGFR in Non Afican American 53  Gloucose, serum 69  Potassium, Serum 4.1  Sodium, Serum 138              01-15-22 @ 10:33  Anion Gap. Serum 18  Blood Urea Nitrogen,Serm 31  Calcium, Total Serum 8.5  Carbon Dioxide, Serum 14  Chloride, Serum 106  Creatinine, Serum 1.33  eGFR in  54  eGFR in Non Afican American 46  Gloucose, serum 110  Potassium, Serum 4.6  Sodium, Serum 138                  CMP  01-18-22 @ 06:48  Jacquie Aminotransferase(ALT/SGPT)--  Albumin, Serum --  Alkaline Phosphatase, Serum --  Anion Gap, Serum 11  Aspartate Aminotransferase (AST/SGOT)--  Bilirubin Total, Serum --  Blood Urea Nitrogen, Serum 34  Calcium,Total Serum 7.7  Carbon Dioxide, Serum 18  Chloride, Serum 105  Creatinine, Serum 1.31  eGFR if  55  eGFR if Non African American 47  Glucose, Serum 110  Potassium, Serum 4.3  Protein Total, Serum --  Sodium, Serum 134                      01-17-22 @ 06:41  Jacquie Aminotransferase(ALT/SGPT)49  Albumin, Serum 2.8  Alkaline Phosphatase, Serum 45  Anion Gap, Serum 12  Aspartate Aminotransferase (AST/SGOT)111  Bilirubin Total, Serum 1.2  Blood Urea Nitrogen, Serum 35  Calcium,Total Serum 7.5  Carbon Dioxide, Serum 17  Chloride, Serum 102  Creatinine, Serum 1.36  eGFR if  52  eGFR if Non African American 45  Glucose, Serum 112  Potassium, Serum 3.9  Protein Total, Serum 5.7  Sodium, Serum 131                      01-16-22 @ 06:57  Jacquie Aminotransferase(ALT/SGPT)44  Albumin, Serum 3.1  Alkaline Phosphatase, Serum 45  Anion Gap, Serum 15  Aspartate Aminotransferase (AST/SGOT)145  Bilirubin Total, Serum 1.2  Blood Urea Nitrogen, Serum 32  Calcium,Total Serum 8.4  Carbon Dioxide, Serum 18  Chloride, Serum 102  Creatinine, Serum 1.35  eGFR if  53  eGFR if Non African American 46  Glucose, Serum 81  Potassium, Serum 4.1  Protein Total, Serum 6.0  Sodium, Serum 135                      01-15-22 @ 18:35  Jacquie Aminotransferase(ALT/SGPT)--  Albumin, Serum --  Alkaline Phosphatase, Serum --  Anion Gap, Serum 18  Aspartate Aminotransferase (AST/SGOT)--  Bilirubin Total, Serum --  Blood Urea Nitrogen, Serum 32  Calcium,Total Serum 8.8  Carbon Dioxide, Serum 19  Chloride, Serum 101  Creatinine, Serum 1.20  eGFR if  61  eGFR if Non African American 53  Glucose, Serum 69  Potassium, Serum 4.1  Protein Total, Serum --  Sodium, Serum 138                      01-15-22 @ 10:33  Jacquie Aminotransferase(ALT/SGPT)--  Albumin, Serum --  Alkaline Phosphatase, Serum --  Anion Gap, Serum 18  Aspartate Aminotransferase (AST/SGOT)--  Bilirubin Total, Serum --  Blood Urea Nitrogen, Serum 31  Calcium,Total Serum 8.5  Carbon Dioxide, Serum 14  Chloride, Serum 106  Creatinine, Serum 1.33  eGFR if  54  eGFR if Non African American 46  Glucose, Serum 110  Potassium, Serum 4.6  Protein Total, Serum --  Sodium, Serum 138                          PT/INR      Amylase/Lipase            RADIOLOGY & ADDITIONAL TESTS:    Imaging Personally Reviewed:  [ ] YES  [ ] NO

## 2022-01-18 NOTE — CONSULT NOTE ADULT - ASSESSMENT
89 y/o M, POOR HISTORIAN, with PMHx of Dementia (A & O x 1-2 self/place), HTN, colon cancer 2012 (s/p left hemicolectomy - chemoport in place), bilateral carotid artery stenosis (s/p stents 2019), osteoarthritis of hands, hx of imbalance , who presented to [facility] ED BIBEMS on 1/15/22 after fall from bed, admitted for syncopal workup, found to be COVID+.       Recommendations      89 y/o M, POOR HISTORIAN, with PMHx of Dementia (A & O x 1-2 self/place), HTN, colon cancer 2012 (s/p left hemicolectomy - chemoport in place), bilateral carotid artery stenosis (s/p stents 2019), osteoarthritis of hands, and hx of gait instability who presented to [facility] ED BIBEMS on 1/15/22 after fall from bed, admitted for syncopal workup, found to be COVID+.       Recommendations     #COVID-19- C/o ??, risk factors include age and HTN, saturating 88% on RA now on 2L saturating 92%  -Please obtain 1x set of blood cultures if pt spikes a fever   -If febrile, txt fever w/tylenol 650mg q6hn PRN  -Incentive spirometry w/education  -OOBTC   -Please begin dexamethasone 6mg PO qd for 9d  -Please begin Remdesivir for a total of 5d (does not prevent patient from being d/c if course isn't completed)  -Please begin moderate dose sliding scale  -Thrombophylaxis: C/w ASA   -For DC, patient can remain on ASA 81mg and/or DOAC for thrombophylaxis       Pending discussion with hospitalist Dr. Dylon Fernandez Pending for transfer, will continue to follow, thank you for the consultation  89 y/o M, POOR HISTORIAN, with PMHx of Dementia (A & O x 1-2 self/place), HTN, colon cancer 2012 (s/p left hemicolectomy - chemoport in place), bilateral carotid artery stenosis (s/p stents 2019), osteoarthritis of hands, and hx of gait instability who presented to [facility] ED BIBEMS on 1/15/22 after fall from bed, admitted for syncopal workup, found to be COVID+.       Recommendations     #COVID-19- C/o ??, risk factors include age and HTN, saturating 88% on RA now on 2L saturating 92%  -Please add on ESR/CRP/ferritin and obtain d-dimer for risk stratification   -Please obtain 1x set of blood cultures if pt spikes a fever   -If febrile, txt fever w/tylenol 650mg q6hn PRN  -Incentive spirometry w/education  -OOBTC   -Please begin dexamethasone 6mg PO qd for 9d  -Please begin Remdesivir for a total of 5d (does not prevent patient from being d/c if course isn't completed)  -Please begin moderate dose sliding scale  -Thrombophylaxis: C/w ASA   -For DC, patient can remain on ASA 81mg and/or DOAC for thrombophylaxis       Pending discussion with hospitalist Dr. Dylon Valenciao Pending for transfer, will continue to follow, thank you for the consultation  89 y/o M, POOR HISTORIAN, with PMHx of Dementia (A & O x 1-2 self/place), HTN, colon cancer 2012 (s/p left hemicolectomy - chemoport in place), bilateral carotid artery stenosis (s/p stents 2019), osteoarthritis of hands, and hx of gait instability who presented to [facility] ED BIBEMS on 1/15/22 after fall from bed, admitted for syncopal workup, found to be COVID+ now progressing to acute hypoxemic respiratory failure 2/2 COVID-19 PNA. Medicine consulted for COVID-19 management.      Recommendations     #COVID-19- C/o non productive cough, risk factors include age and HTN, saturating 88% on RA now on 2L saturating 92%  -Please add on ESR/CRP/ferritin and obtain d-dimer for risk stratification   -Please obtain 1x set of blood cultures if pt spikes a fever   -If febrile, txt fever w/tylenol 650mg q6hn PRN  -Incentive spirometry w/education  -OOBTC   -Please begin dexamethasone 6mg PO qd for 9d  -Please begin Remdesivir for a total of 5d (does not prevent patient from being d/c if course isn't completed)  -Please begin moderate dose sliding scale  -Thrombophylaxis: C/w ASA   -For DC, patient can remain on ASA 81mg and/or DOAC for thrombophylaxis     #Acute Hypoxemic Respiratory Failure 2/2 COVID-19 PNA  -see plan above     Discussed plan with hospitalist Dr. Osborne  Patient is deemed acceptable for transfer to medicine, thank you for the consultation

## 2022-01-19 NOTE — PROGRESS NOTE ADULT - ATTENDING COMMENTS
Initial attending contact date 1/17/22     . See PA note written above for details. I reviewed the PA documentation. I have personally seen and examined this patient. I reviewed vitals, labs, medications, cardiac studies, and additional imaging. I agree with the above PA's findings and plans as written above with the following additions/statements.    89M dementia HTN colon ca prior carotid artery stents, OA, hx of imbalance per notes who presented after falls of unclear etiology, covid +  -AAO x 1  -No active cardiac issues  -ECHO with normal LVEF  -With rate controlled A fib. Would defer AC given hx of frequent falls   -Cont plavix, statin  -BP controlled  -Pls reconsult as needed

## 2022-01-19 NOTE — PROGRESS NOTE ADULT - PROBLEM SELECTOR PLAN 4
As per outpatient notes, patient has hx of feeling off balance - was attributed to combination of neurodegenerative process, encephalomalacia, spinal stenosis.  - CT Head 1/15/22: No acute intracranial hemorrhage or calvarial fracture.   - Neuro consulted - vEEG negative  - TTE w normal systolic function and no valvular abnormalities

## 2022-01-19 NOTE — PROGRESS NOTE ADULT - PROBLEM SELECTOR PLAN 7
Hx of carotid artery stenosis; s/p R PTA in 2019 at [facility]. Vascular consulted- carotid duplex reviewed w/ Dr. Balbuena and chief resident, no need for acute surgical intervention at this time  -Per Vascular, fall unlikely related to carotid artery disease  -Patient should follow-up with Dr. Balbuena 1 week after discharge. Please call [phone] to schedule an appointment  -continue Plavix 75mg and  Eliquis 5mg BID   -c/w lipitor upon discharge,holding for now in setting of transaminitis

## 2022-01-19 NOTE — PROGRESS NOTE ADULT - PROBLEM SELECTOR PLAN 10
Fluids: 500cc LR bolus  Electrolytes: Mg>2, K>4  Nutrition:  DASH/TLC  Prophylaxis: Eliquis 5mg BID  Activity: AAT, OOBTC  GI: none  C: FC  Dispo: Admit to [facility]

## 2022-01-19 NOTE — PROGRESS NOTE ADULT - ASSESSMENT
89 Male with dementia and Pmhx of HTN, Carotid Artery Disease s/p PCI, hx of imbalance who presented s/p Mechanical fall, Found to be covid +, in Afib with low ventricular response, ruled out for ACS, now with worsening COVID transferred to medicine service. Cardiology following for cardiac optimization 89 Male with dementia and Pmhx of HTN, Carotid Artery Disease s/p PCI, hx of imbalance who presented s/p Mechanical fall, Found to be covid +, in Afib with low ventricular response, ruled out for ACS, now with worsening COVID transferred to medicine service. Cardiology following for cardiac optimization    1) Afib, rate controlled  -Patient with known ASCVD and known Afib here with rate control Afib and at times with low ventricular response  -Echo this admission with normal BIV function  -Patient is AoX1 and proned to mechanical falls. Would defer AC for now and continue with Plavix 75 mg daily given his known Carotid artery disease  -Would discontinue Norvasc and continue with Enalapril for BP control  -Continue with Lipitor 80  -Please keep K>4 and Mag>2  -Please reconsult Cardiology as needed

## 2022-01-19 NOTE — PROGRESS NOTE ADULT - ASSESSMENT
89 y/o M, POOR HISTORIAN, with PMHx of Dementia (A & O x 1-2 self/place), HTN, colon cancer 2012 (s/p left hemicolectomy - chemoport in place), bilateral carotid artery stenosis (s/p stents 2019), osteoarthritis of hands, and hx of gait instability who presented to [facility] ED BIBEMS on 1/15/22 after fall from bed, admitted for syncopal workup (negative) w course c/b acute hypoxic respiratory failure 2/2 covid pna.

## 2022-01-19 NOTE — PROGRESS NOTE ADULT - SUBJECTIVE AND OBJECTIVE BOX
INTERVAL HPI/OVERNIGHT EVENTS   Patient was seen and examined at bedside. Pt pulled off NC overnight and desatted to 88. In the am patient was disoriented, asking what he was supposed to do. He complained of a cough but denied SOB, CP, abd pain, N/V.    VITAL SIGNS:  T(F): 97.3 (01-19-22 @ 13:14)  HR: 47 (01-19-22 @ 13:14)  BP: 105/47 (01-19-22 @ 13:14)  RR: 17 (01-19-22 @ 13:14)  SpO2: 93% (01-19-22 @ 13:14)  Wt(kg): --    PHYSICAL EXAM:    Constitutional: Elderly m sitting on edge of bed, disoriented  HEENT: MMM, on 4L NC  Respiratory: bibasilar crackles, no increased work of breathing, coughing during exam  Cardiovascular: RRR, normal S1S2, no extra heart sounds, no murmur  Gastrointestinal: normoactive bowel sounds, soft, NTND, no masses palpable  Extremities: Warm, well perfused, no edema  Neurological: AAOx3, CN Grossly intact  Skin: Normal temperature, warm, dry    MEDICATIONS  (STANDING):  amLODIPine   Tablet 5 milliGRAM(s) Oral daily  apixaban 5 milliGRAM(s) Oral two times a day  clopidogrel Tablet 75 milliGRAM(s) Oral daily  dexAMETHasone  Injectable 6 milliGRAM(s) IV Push every 24 hours  insulin lispro (ADMELOG) corrective regimen sliding scale   SubCutaneous Before meals and at bedtime  remdesivir  IVPB   IV Intermittent   remdesivir  IVPB 100 milliGRAM(s) IV Intermittent every 24 hours  senna 2 Tablet(s) Oral at bedtime  tamsulosin 0.4 milliGRAM(s) Oral at bedtime    MEDICATIONS  (PRN):  acetaminophen     Tablet .. 650 milliGRAM(s) Oral every 6 hours PRN Temp greater or equal to 38C (100.4F)  benzonatate 100 milliGRAM(s) Oral three times a day PRN Cough      Allergies    No Known Allergies    Intolerances        LABS:                        11.8   9.56  )-----------( 160      ( 19 Jan 2022 07:32 )             36.6     01-19    135  |  105  |  44<H>  ----------------------------<  142<H>  4.2   |  19<L>  |  1.44<H>    Ca    7.8<L>      19 Jan 2022 07:32  Phos  3.1     01-19  Mg     2.9     01-19    TPro  6.0  /  Alb  2.6<L>  /  TBili  1.0  /  DBili  x   /  AST  110<H>  /  ALT  69<H>  /  AlkPhos  81  01-19            RADIOLOGY & ADDITIONAL TESTS:  Reviewed

## 2022-01-19 NOTE — PROGRESS NOTE ADULT - PROBLEM SELECTOR PLAN 1
2/2 COVID PNA. Patient + for COVID-19 on admission, now hypoxic requiring 4L NC. Desats when NC removed. No increased work of breathing but does have cough.  -c/w remdesivir and decadron 1/18  -incentive spirometer, OOBTC as tolerated

## 2022-01-19 NOTE — PROGRESS NOTE ADULT - SUBJECTIVE AND OBJECTIVE BOX
Interval HPI:      PAST MEDICAL & SURGICAL HISTORY:  HTN (hypertension)  High cholesterol  BPH (benign prostatic hyperplasia)  Colon cancer  H/O hemicolectomy  Presence of internal carotid stent    Home Medications:  Aspirin Enteric Coated 81 mg oral delayed release tablet: 1 tab(s) orally once a day (15 Macario 2022 14:10)  enalapril 10 mg oral tablet: 1 tab(s) orally once a day (15 Macario 2022 14:10)  Plavix 75 mg oral tablet: 1 tab(s) orally once a day (15 Macario 2022 14:10)  rosuvastatin 5 mg oral capsule: 1 cap(s) orally once a day (15 Macario 2022 14:10)  tamsulosin 0.4 mg oral capsule: 1 cap(s) orally once a day (15 Macario 2022 14:10)      MEDICATIONS  (STANDING):  amLODIPine   Tablet 5 milliGRAM(s) Oral daily  apixaban 5 milliGRAM(s) Oral two times a day  clopidogrel Tablet 75 milliGRAM(s) Oral daily  dexAMETHasone  Injectable 6 milliGRAM(s) IV Push every 24 hours  enalapril 10 milliGRAM(s) Oral daily  insulin lispro (ADMELOG) corrective regimen sliding scale   SubCutaneous Before meals and at bedtime  remdesivir  IVPB   IV Intermittent   remdesivir  IVPB 100 milliGRAM(s) IV Intermittent every 24 hours  senna 2 Tablet(s) Oral at bedtime  tamsulosin 0.4 milliGRAM(s) Oral at bedtime    MEDICATIONS  (PRN):  acetaminophen     Tablet .. 650 milliGRAM(s) Oral every 6 hours PRN Temp greater or equal to 38C (100.4F)  benzonatate 100 milliGRAM(s) Oral three times a day PRN Cough      .  VITAL SIGNS:  T(C): 36.4 (01-19-22 @ 05:52), Max: 38.9 (01-18-22 @ 09:42)  T(F): 97.5 (01-19-22 @ 05:52), Max: 102 (01-18-22 @ 09:42)  HR: 45 (01-19-22 @ 05:52) (45 - 71)  BP: 107/55 (01-19-22 @ 05:52) (103/51 - 132/73)  BP(mean): --  RR: 17 (01-19-22 @ 05:52) (16 - 18)  SpO2: 94% (01-19-22 @ 05:52) (88% - 97%)  Wt(kg): --    PHYSICAL EXAM: INCOMPLETE    Constitutional: WDWN resting comfortably in bed; NAD  Head: NC/AT  Eyes: PERRL, EOMI, anicteric sclera  ENT: no nasal discharge; uvula midline, no oropharyngeal erythema or exudates; MMM  Neck: supple; no JVD or thyromegaly  Respiratory: CTA B/L; no W/R/R, no retractions  Cardiac: +S1/S2; RRR; no M/R/G; PMI non-displaced  Gastrointestinal: soft, NT/ND; no rebound or guarding; +BSx4  Genitourinary: normal external genitalia  Back: spine midline, no bony tenderness or step-offs; no CVAT B/L  Extremities: WWP, no clubbing or cyanosis; no peripheral edema  Musculoskeletal: NROM x4; no joint swelling, tenderness or erythema  Vascular: 2+ radial, femoral, DP/PT pulses B/L  Dermatologic: skin warm, dry and intact; no rashes, wounds, or scars  Lymphatic: no submandibular or cervical LAD  Neurologic: AAOx3; CNII-XII grossly intact; no focal deficits  Psychiatric: affect and characteristics of appearance, verbalizations, behaviors are appropriate    .  LABS:                         11.8   9.56  )-----------( 160      ( 19 Jan 2022 07:32 )             36.6     01-19    135  |  105  |  44<H>  ----------------------------<  142<H>  4.2   |  19<L>  |  1.44<H>    Ca    7.8<L>      19 Jan 2022 07:32  Phos  3.1     01-19  Mg     2.9     01-19    TPro  6.0  /  Alb  2.6<L>  /  TBili  1.0  /  DBili  x   /  AST  110<H>  /  ALT  69<H>  /  AlkPhos  81  01-19            RADIOLOGY, EKG & ADDITIONAL TESTS: Reviewed.        Interval HPI: Pt seen and examined at bedside. Denies any current discomfort      PAST MEDICAL & SURGICAL HISTORY:  HTN (hypertension)  High cholesterol  BPH (benign prostatic hyperplasia)  Colon cancer  H/O hemicolectomy  Presence of internal carotid stent    Home Medications:  Aspirin Enteric Coated 81 mg oral delayed release tablet: 1 tab(s) orally once a day (15 Macario 2022 14:10)  enalapril 10 mg oral tablet: 1 tab(s) orally once a day (15 Macario 2022 14:10)  Plavix 75 mg oral tablet: 1 tab(s) orally once a day (15 Macario 2022 14:10)  rosuvastatin 5 mg oral capsule: 1 cap(s) orally once a day (15 Macario 2022 14:10)  tamsulosin 0.4 mg oral capsule: 1 cap(s) orally once a day (15 Macario 2022 14:10)      MEDICATIONS  (STANDING):  amLODIPine   Tablet 5 milliGRAM(s) Oral daily  apixaban 5 milliGRAM(s) Oral two times a day  clopidogrel Tablet 75 milliGRAM(s) Oral daily  dexAMETHasone  Injectable 6 milliGRAM(s) IV Push every 24 hours  enalapril 10 milliGRAM(s) Oral daily  insulin lispro (ADMELOG) corrective regimen sliding scale   SubCutaneous Before meals and at bedtime  remdesivir  IVPB   IV Intermittent   remdesivir  IVPB 100 milliGRAM(s) IV Intermittent every 24 hours  senna 2 Tablet(s) Oral at bedtime  tamsulosin 0.4 milliGRAM(s) Oral at bedtime    MEDICATIONS  (PRN):  acetaminophen     Tablet .. 650 milliGRAM(s) Oral every 6 hours PRN Temp greater or equal to 38C (100.4F)  benzonatate 100 milliGRAM(s) Oral three times a day PRN Cough      .  VITAL SIGNS:  T(C): 36.4 (01-19-22 @ 05:52), Max: 38.9 (01-18-22 @ 09:42)  T(F): 97.5 (01-19-22 @ 05:52), Max: 102 (01-18-22 @ 09:42)  HR: 45 (01-19-22 @ 05:52) (45 - 71)  BP: 107/55 (01-19-22 @ 05:52) (103/51 - 132/73)  BP(mean): --  RR: 17 (01-19-22 @ 05:52) (16 - 18)  SpO2: 94% (01-19-22 @ 05:52) (88% - 97%)  Wt(kg): --    PHYSICAL EXAM:     Constitutional: WDWN resting comfortably in bed; NAD  Head: NC/AT  ENT:  MMM  Neck: supple; no JVD   Respiratory: CTA B/L; no W/R/R  Cardiac: +S1/S2; Irregular RR; no M/R/G;   Gastrointestinal: soft, NT/ND; no rebound or guarding; +BS  Extremities: WWP, no clubbing or cyanosis; no peripheral edema  Vascular: 2+ radial,, DP/PT pulses B/L  Neurologic: AAOx1; CNII-XII grossly intact; no focal deficits      .  LABS:                         11.8   9.56  )-----------( 160      ( 19 Jan 2022 07:32 )             36.6     01-19    135  |  105  |  44<H>  ----------------------------<  142<H>  4.2   |  19<L>  |  1.44<H>    Ca    7.8<L>      19 Jan 2022 07:32  Phos  3.1     01-19  Mg     2.9     01-19    TPro  6.0  /  Alb  2.6<L>  /  TBili  1.0  /  DBili  x   /  AST  110<H>  /  ALT  69<H>  /  AlkPhos  81  01-19            RADIOLOGY, EKG & ADDITIONAL TESTS: Reviewed.

## 2022-01-20 NOTE — DIETITIAN INITIAL EVALUATION ADULT. - PROBLEM SELECTOR PLAN 7
Hx of htn, home med: Enalapril 10 mg once daily  - Start Enalapril 10 mg once daily   - Cont Amlodipine 5 mg once daily

## 2022-01-20 NOTE — PROVIDER CONTACT NOTE (OTHER) - BACKGROUND
Pt has been bradycardic with no changes in O2 Stat and CT.
See provider notes, pt admitted for syncopal episode and pt found to have covid

## 2022-01-20 NOTE — PROGRESS NOTE ADULT - ATTENDING COMMENTS
Episode of de-saturating earlier this morning, was briefly placed on NRB and now back on 4-6L NC. Monitor sats closely today.   Repeat CXR in AM  Plans for [facility] dispo

## 2022-01-20 NOTE — PROVIDER CONTACT NOTE (OTHER) - RECOMMENDATIONS
Notify MAHOGANY Partida
Pt was put on Non-rebreather 10L. O2 Stat and PA increased to 92% and 55 PA.

## 2022-01-20 NOTE — DIETITIAN INITIAL EVALUATION ADULT. - PROBLEM SELECTOR PLAN 3
Hx of carotid artery stenosis; s/p R PTA in 2019 at [facility]  - CT Neck with right carotid stent in place with narrowing of its lumen   - Vascular consulted - bilateral carotid duplex ordered   - Continue Plavix/Atorva (IC for Crestor 5)

## 2022-01-20 NOTE — DIETITIAN INITIAL EVALUATION ADULT. - PROBLEM SELECTOR PLAN 6
RULE OUT - Patient with elevated BNP to 8824, then elevated to 19269  - ECHO from 2019 with normal EF, mild MR, trace MR  - Repeat echo ordered

## 2022-01-20 NOTE — PROGRESS NOTE ADULT - PROBLEM SELECTOR PLAN 1
2/2 COVID PNA. Patient + for COVID-19 on admission, now hypoxic requiring 6L NC. Desats when NC removed. No increased work of breathing but does have cough.   -c/w remdesivir and decadron 1/18  -incentive spirometer, OOBTC as tolerated

## 2022-01-20 NOTE — DIETITIAN INITIAL EVALUATION ADULT. - HEIGHT FOR BMI (FEET)
5 Moderate: Comprehensive teaching/Verbalized Understanding/Patient asked questions/Returned Demonstration

## 2022-01-20 NOTE — PROVIDER CONTACT NOTE (OTHER) - ASSESSMENT
Pt's HR 95 afib, /107, sp02 96% on room air and temperature 104F rectal. Pt shivering, pt states "I'm so cold". Pt warm to touch
Pt did not seem like he was in respiratory distress but did have a consistent dry cough.

## 2022-01-20 NOTE — PROGRESS NOTE ADULT - PROBLEM SELECTOR PLAN 7
Hx of carotid artery stenosis; s/p R PTA in 2019 at [facility]. Vascular consulted- carotid duplex reviewed w/ Dr. Balbuena and chief resident, no need for acute surgical intervention at this time  -Per Vascular, fall unlikely related to carotid artery disease  -Patient should follow-up with Dr. Balbuena 1 week after discharge. Please call [phone] to schedule an appointment  -continue Plavix 75mg and  Eliquis 5mg BID   -c/w lipitor upon discharge, holding for now in setting of transaminitis

## 2022-01-20 NOTE — PROGRESS NOTE ADULT - ASSESSMENT
89 y/o M, POOR HISTORIAN, with PMHx of Dementia (A & O x 1-2 self/place), HTN, colon cancer 2012 (s/p left hemicolectomy - chemoport in place), bilateral carotid artery stenosis (s/p stents 2019), osteoarthritis of hands, and hx of gait instability who presented to [facility] ED BIBEMS on 1/15/22 after fall from bed, admitted for syncopal workup (negative) w course c/b acute hypoxic respiratory failure 2/2 covid pna.

## 2022-01-20 NOTE — PROGRESS NOTE ADULT - PROBLEM SELECTOR PLAN 10
Fluids: s/p 500cc bolus   Electrolytes: Mg>2, K>4  Nutrition:  DASH/TLC  Prophylaxis: Eliquis 5mg BID  Activity: AAT, OOBTC  GI: none  C: FC  Dispo: Admit to [facility] Fluids: s/p 500cc bolus   Electrolytes: Mg>2, K>4  Nutrition:  DASH/TLC  Prophylaxis: Eliquis 5mg BID  Activity: AAT, OOBTC  GI: none  C: FC  Dispo: [facility]

## 2022-01-20 NOTE — DIETITIAN INITIAL EVALUATION ADULT. - OTHER INFO
89 y/o M, POOR HISTORIAN, with PMHx of Dementia (A & O x 1-2 self/place), HTN, colon cancer 2012 (s/p left hemicolectomy - chemo port in place), bilateral carotid artery stenosis (s/p stents 2019), osteoarthritis of hands, and hx of gait instability who presented to [facility] ED BIBEMS on 1/15/22 after fall from bed, admitted for syncopal workup (negative) w course c/b acute hypoxic respiratory failure 2/2 Covid-19 PNA.  Today observed eating >60% of meals. No N/V/D/C or pain .Skin intact. BM 1/15.Patient on 4-6liter NC. pending SHEILA. Per weights patient is 90% of IBW.IBW:155lbs.Unable to quantify UBW.

## 2022-01-20 NOTE — DIETITIAN INITIAL EVALUATION ADULT. - PROBLEM SELECTOR PLAN 2
On admission troponin elevated to 0.15 -> 0.11  - CK: 1843 -> 2341, CKMB: 11.4 -> 14.6  - EKG: Afib @ 52 bpm, peaked t waves, RBBB  - ECHO from 2019 - EF: 57%, Mild MR, Trace TR   - Troponin likely elevated from fall   - Repeat ECHO ordered

## 2022-01-20 NOTE — DIETITIAN INITIAL EVALUATION ADULT. - PROBLEM SELECTOR PLAN 4
Patient with afib on admission, rates to 50s  - Unclear if new afib - not on A/C  - EKG: Afib @ 52 bpm, peaked t waves, RBBB  - Tele monitoring   - CHADsVASc: 4   - Awaiting vascular recs if ok to stop Aspirin; if so will start Eliquis 2.5 mg BID   - EP consult   - Tele monitoring

## 2022-01-20 NOTE — DIETITIAN INITIAL EVALUATION ADULT. - PROBLEM SELECTOR PLAN 8
Patient with hx of CKD as per MD note - CKD stage 2  - Cr 1.7 in 04/2021, 1.0 12/2021  - Cr on admission: 1.33  - Will repeat Creatinine  - Continue to trend, avoid nephrotoxic agents

## 2022-01-20 NOTE — PROVIDER CONTACT NOTE (OTHER) - SITUATION
Pt was bradycardic on 2L NC with AR of 46 and O2 stat of 83%. Pt was then increased to 4 then 6 L on NC but there was no change. Pt was then put on 10L Non-rebreather and O2 was brought back up to 92%
Pt with 104F rectal temperature

## 2022-01-20 NOTE — PROGRESS NOTE ADULT - SUBJECTIVE AND OBJECTIVE BOX
INTERVAL HPI/OVERNIGHT EVENTS:  Patient was seen and examined at bedside. Patient pulled of O2 overnight, desatted to 87. Bradycardic to 45, a fib slow ventricular response on ekg. Patient mentating at baseline and hemodynamically stable. In the morning only complained of cough. No SOB, CP, abd pain, N/V.    VITAL SIGNS:  T(F): 97.9 (01-20-22 @ 12:47)  HR: 52 (01-20-22 @ 12:47)  BP: 135/68 (01-20-22 @ 12:47)  RR: 18 (01-20-22 @ 12:47)  SpO2: 89% (01-20-22 @ 12:47)  Wt(kg): --    PHYSICAL EXAM:    Constitutional: Elderly m sleeping in bed w head of bed raised, easily arousable on 6L NC  HEENT: MMM, on 6L NC  Respiratory: bibasilar crackles, no increased work of breathing, coughing during exam  Cardiovascular: RRR, normal S1S2, no extra heart sounds, no murmur  Gastrointestinal: normoactive bowel sounds, soft, NTND, no masses palpable  Extremities: Warm, well perfused, no edema  Neurological: AAOx3, CN Grossly intact  Skin: Normal temperature, warm, dry    MEDICATIONS  (STANDING):  amLODIPine   Tablet 5 milliGRAM(s) Oral daily  apixaban 5 milliGRAM(s) Oral two times a day  clopidogrel Tablet 75 milliGRAM(s) Oral daily  dexAMETHasone  Injectable 6 milliGRAM(s) IV Push every 24 hours  insulin lispro (ADMELOG) corrective regimen sliding scale   SubCutaneous Before meals and at bedtime  polyethylene glycol 3350 17 Gram(s) Oral every 12 hours  remdesivir  IVPB 100 milliGRAM(s) IV Intermittent every 24 hours  remdesivir  IVPB   IV Intermittent   senna 2 Tablet(s) Oral at bedtime  tamsulosin 0.4 milliGRAM(s) Oral at bedtime    MEDICATIONS  (PRN):  acetaminophen     Tablet .. 650 milliGRAM(s) Oral every 6 hours PRN Temp greater or equal to 38C (100.4F)  benzonatate 100 milliGRAM(s) Oral three times a day PRN Cough      Allergies    No Known Allergies    Intolerances        LABS:                        11.2   12.02 )-----------( 218      ( 20 Jan 2022 06:41 )             34.9     01-20    135  |  106  |  56<H>  ----------------------------<  155<H>  4.2   |  18<L>  |  1.29    Ca    7.7<L>      20 Jan 2022 06:41  Phos  3.1     01-19  Mg     2.8     01-20    TPro  6.0  /  Alb  2.6<L>  /  TBili  1.0  /  DBili  x   /  AST  110<H>  /  ALT  69<H>  /  AlkPhos  81  01-19            RADIOLOGY & ADDITIONAL TESTS:  Reviewed

## 2022-01-20 NOTE — DIETITIAN INITIAL EVALUATION ADULT. - PROBLEM SELECTOR PLAN 5
Patient + for COVID-19  - Ddimer elevated to 413, Ferritin: 329, LDH: 327, CRP: 120.1; procal: 1.12  - Medicine consulted  - Trend COVID labs

## 2022-01-20 NOTE — PROVIDER CONTACT NOTE (OTHER) - ACTION/TREATMENT ORDERED:
MAHOGANY Partida made aware, see new orders, Tylenol given/IVF infusing. RN to continue to assess pt.
MD was notified and checked on Pt.

## 2022-01-21 NOTE — CONSULT NOTE ADULT - ASSESSMENT
89 M with acute respiratory failure due to COVID, Alzheimer's dementia, debility, encounter for palliative care.

## 2022-01-21 NOTE — DISCHARGE NOTE PROVIDER - CARE PROVIDER_API CALL
Jonna Balbuena)  Surgery; Vascular Surgery  [address], [address] [place] [address]  Phone: [phone]  Fax: [phone]  Follow Up Time: 1 week   Cathy Montero)  [facility] Surgery  Vascular  [address], [address]  [place] [address]  Phone: [phone]  Fax: [phone]  Scheduled Appointment: 02/03/2022 12:30 PM

## 2022-01-21 NOTE — PROGRESS NOTE ADULT - PROBLEM SELECTOR PLAN 7
Hx of carotid artery stenosis; s/p R PTA in 2019 at [facility]. Vascular consulted- carotid duplex reviewed w/ Dr. Balbuena and chief resident, no need for acute surgical intervention at this time  -Per Vascular, fall unlikely related to carotid artery disease  -Patient should follow-up with Dr. Balbuena 1 week after discharge. Please call [phone] to schedule an appointment  -continue Plavix 75mg and  Eliquis 5mg BID   -c/w lipitor upon discharge, holding for now in setting of transaminitis

## 2022-01-21 NOTE — PROGRESS NOTE ADULT - PROBLEM SELECTOR PLAN 1
2/2 COVID PNA. Patient + for COVID-19 on admission, today 6L NC. Desats when NC removed. No increased work of breathing but does have cough.   -c/w remdesivir and decadron 1/18  -incentive spirometer, OOBTC as tolerated

## 2022-01-21 NOTE — PROGRESS NOTE ADULT - ASSESSMENT
89 y/o M, POOR HISTORIAN, with PMHx of Dementia (A & O x 1-2 self/place), HTN, colon cancer 2012 (s/p left hemicolectomy - chemoport in place), bilateral carotid artery stenosis (s/p stents 2019), osteoarthritis of hands, and hx of gait instability who presented to [facility] ED BIBEMS on 1/15/22 after fall from bed, admitted for syncopal workup (negative) w course c/b acute hypoxic respiratory failure 2/2 covid pna.

## 2022-01-21 NOTE — CONSULT NOTE ADULT - PROBLEM SELECTOR RECOMMENDATION 3
- Evidence of moderate to severe dementia at baseline. Mental status waxes and wanes.  - Currently, difficult to assess proper baseline.  - Supportive care.  - Frequent reorientation.  - Zyprexa available.

## 2022-01-21 NOTE — PROGRESS NOTE ADULT - SUBJECTIVE AND OBJECTIVE BOX
INTERVAL HPI/OVERNIGHT EVENTS:  Patient was seen and examined at bedside. Denies any CP, SOB, abd pain, N/V.    VITAL SIGNS:  T(F): 98.2 (01-21-22 @ 10:06)  HR: 53 (01-21-22 @ 10:06)  BP: 127/64 (01-21-22 @ 10:06)  RR: 18 (01-21-22 @ 10:06)  SpO2: 90% (01-21-22 @ 11:00)  Wt(kg): --    PHYSICAL EXAM:    Constitutional: Elderly m awake in bed, disoriented  HEENT: MMM, on 6L NC  Respiratory: bibasilar crackles, no increased work of breathing, coughing during exam  Cardiovascular: RRR, normal S1S2, no extra heart sounds, no murmur  Gastrointestinal: normoactive bowel sounds, soft, NTND, no masses palpable  Extremities: Warm, well perfused, no edema  Neurological: AAOx3, CN Grossly intact  Skin: Normal temperature, warm, dry      MEDICATIONS  (STANDING):  amLODIPine   Tablet 5 milliGRAM(s) Oral daily  apixaban 5 milliGRAM(s) Oral two times a day  clopidogrel Tablet 75 milliGRAM(s) Oral daily  dexAMETHasone  Injectable 6 milliGRAM(s) IV Push every 24 hours  insulin lispro (ADMELOG) corrective regimen sliding scale   SubCutaneous Before meals and at bedtime  polyethylene glycol 3350 17 Gram(s) Oral every 12 hours  remdesivir  IVPB 100 milliGRAM(s) IV Intermittent every 24 hours  remdesivir  IVPB   IV Intermittent   senna 2 Tablet(s) Oral at bedtime  tamsulosin 0.4 milliGRAM(s) Oral at bedtime    MEDICATIONS  (PRN):  acetaminophen     Tablet .. 650 milliGRAM(s) Oral every 6 hours PRN Temp greater or equal to 38C (100.4F)  benzonatate 100 milliGRAM(s) Oral three times a day PRN Cough      Allergies    No Known Allergies    Intolerances        LABS:                        11.2   13.82 )-----------( 277      ( 21 Jan 2022 08:08 )             35.2     01-21    140  |  109<H>  |  53<H>  ----------------------------<  146<H>  4.9   |  18<L>  |  1.19    Ca    8.2<L>      21 Jan 2022 08:06  Phos  2.7     01-21  Mg     2.9     01-21    TPro  6.1  /  Alb  2.9<L>  /  TBili  1.1  /  DBili  x   /  AST  59<H>  /  ALT  59<H>  /  AlkPhos  78  01-21            RADIOLOGY & ADDITIONAL TESTS:  Reviewed

## 2022-01-21 NOTE — CONSULT NOTE ADULT - SUBJECTIVE AND OBJECTIVE BOX
[facility] Geriatrics and Palliative Care  Contact Info: Call [phone] (HEAL Line) or message on Microsoft Teams    HPI:  Patient is a 89 y/o M, POOR HISTORIAN, with PMHx of Dementia (A & O x 1-2 self/place), HTN, colon cancer 2012 (s/p left hemicolectomy - chemoport in place), bilateral carotid artery stenosis (s/p stents 2019), osteoarthritis of hands, hx of imbalance , who presented to [facility] ED BiEMS on 1/15/22 after fall from bed. Patient was found lying down supine in living room - wife said he tripped and fell, so she called 911.  Patient states that he does not remember what happened and does not state he fell. Patient denies CP, SOB, palpitations, dizziness, fatigue, headache, n/v/d, abdominal pain, blurry vision, loss of sensation, numbness or tingling.     Upon admit, VS - T 98.3F, HR 67, /76, RR 17, SpO2 97% on RA. Labs significant for ALT 27, AST, blood glucose 113, BUN/Cr 28/1.33, PTT 1.7, INR 1.5, H/H 10.9/34.4, platelet 107, covid +, troponin 0.15.  CT  head prelim read 1/15: no acute ICH or calvarial fracture, posterior-superior R scalp bruising, asymmetric atrophy of L anterior maxillary horn, chronic small vessel ischemic disease. CT cervical spine prelim 1/15: No vertebral body fracture or dislocation. ECG 1/15/22: slow Afib @ 52 VR w/ RBBB, peaked T waves.  Pt admitted to [facility] for tele for further management of syncope.  (15 Macario 2022 11:42)    PERTINENT PM/SXH:   HTN (hypertension)    High cholesterol    BPH (benign prostatic hyperplasia)    Colon cancer      H/O hemicolectomy    Presence of internal carotid stent      FAMILY HISTORY:    ITEMS NOT CHECKED ARE NOT PRESENT    SOCIAL HISTORY:   Significant other/partner:  []  Children:  []   Substance hx:  []   Tobacco hx:  [x]   Alcohol hx: []   Home Opioid hx:  [] I-Stop Reference No:  - no active Rx's / see chart note  Living Situation: [x]Home  []Long term care  []Rehab []Other  Tenriism/Spiritual practice: ; Role of organized Yarsani [ ] important [ ] some [ ] unable to assess  Coping: [ ] well [ ] with difficulty [ ] poor coping [x ] unable to assess  Support system: [ ] strong [x ] adequate [ ] inadequate    ADVANCE DIRECTIVES:    [x]MOLST  []Living Will  DECISION MAKER(s):  [] Health Care Proxy(s)  [x] Surrogate(s)  [] Guardian           Name(s)/Phone Number(s): Cesilia Frazier (spouse)    BASELINE (I)ADLs (prior to admission):  [place]: []Total  [] Moderate [x]Dependent    ALLERGIES:  No Known Allergies    MEDICATIONS  (STANDING):  amLODIPine   Tablet 10 milliGRAM(s) Oral every 24 hours  clopidogrel Tablet 75 milliGRAM(s) Oral daily  enoxaparin Injectable 40 milliGRAM(s) SubCutaneous at bedtime  insulin lispro (ADMELOG) corrective regimen sliding scale   SubCutaneous Before meals and at bedtime  lactated ringers. 1000 milliLiter(s) (75 mL/Hr) IV Continuous <Continuous>  OLANZapine 2.5 milliGRAM(s) Oral daily  pantoprazole    Tablet 40 milliGRAM(s) Oral before breakfast  polyethylene glycol 3350 17 Gram(s) Oral every 12 hours  predniSONE   Tablet 60 milliGRAM(s) Oral daily  QUEtiapine 25 milliGRAM(s) Oral at bedtime  senna 2 Tablet(s) Oral at bedtime  tamsulosin 0.4 milliGRAM(s) Oral at bedtime    MEDICATIONS  (PRN):  acetaminophen     Tablet .. 650 milliGRAM(s) Oral every 6 hours PRN Temp greater or equal to 38C (100.4F)  benzonatate 100 milliGRAM(s) Oral three times a day PRN Cough    PRESENT SYMPTOMS: [x]Unable to obtain due to poor mentation/encephalopathy  Source if other than patient:  []Family   []Team     Pain: [ ] yes [x ] no  QOL impact -   Location -                    Aggravating Factors -  Quality -  Radiation -  Timing -  Severity (0-10 scale) -   Minimal Acceptable Level (0-10 scale) -    PAIN AD Score:  [email or web address]/[email or web address] (press ctrl +  left click to view)    Dyspnea:                           []Mild  []Moderate []Severe  Anxiety:                             []Mild []Moderate []Severe  Fatigue:                             []Mild []Moderate []Severe  Nausea:                             []Mild []Moderate []Severe  Loss of Appetite:              []Mild []Moderate []Severe  Constipation:                    []Mild []Moderate []Severe    Other Symptoms:  [x]All other review of systems negative     Palliative Performance Status Version 2: 30 %    [email or web address]/[email or web address]_performance_scale_[email or web address]    PHYSICAL EXAM:  GENERAL:  []Alert  []Oriented x   [x]Lethargic  [x]Cachexia  []Unarousable  []Verbal  []Non-Verbal  Behavioral:   []Anxiety  []Delirium []Agitation [x]Cooperative  HEENT:  []Normal   [x]Dry mouth   []ET Tube/Trach  []Oral lesions  PULMONARY:   []Clear [x]Tachypnea  []Audible excessive secretions   [x]Rhonchi        []Right []Left [x]Bilateral  []Crackles        []Right []Left []Bilateral  []Wheezing     []Right []Left []Bilateral  CARDIOVASCULAR:    [x]Regular []Irregular []Tachy  []Harsh []Murmur []Other  GASTROINTESTINAL:  [x]Soft  []Distended   [x]+BS  [x]Non tender []Tender  []PEG []OGT/ NGT  Last BM:     GENITOURINARY:  []Normal [] Incontinent   []Oliguria/Anuria   [xFoley  MUSCULOSKELETAL:   []Normal   x[]Weakness  [x]Bed/Wheelchair bound []Edema  NEUROLOGIC:   []No focal deficits  [x]Cognitive impairment  []Dysphagia []Dysarthria []Paresis []Encephalopathic   SKIN:   [x]Normal   []Pressure ulcer(s)  []Rash    CRITICAL CARE:  [ ]Shock Present  [ ]Septic [ ]Cardiogenic [ ]Neurologic [ ]Hypovolemic  [ ]Vasopressors [ ]Inotropes   [x ]Respiratory failure present [ ]Mechanical Ventilation [ ]Non-invasive ventilatory support [ ]High-Flow  [x ]Acute  [ ]Chronic [x ]Hypoxic  [ ]Hypercarbic  [ ]Other organ failure    Vital Signs Last 24 Hrs  T(C): 36.1 (26 Jan 2022 04:27), Max: 36.1 (26 Jan 2022 00:56)  T(F): 97 (26 Jan 2022 04:27), Max: 97 (26 Jan 2022 00:56)  HR: 46 (26 Jan 2022 09:50) (46 - 84)  BP: 140/62 (26 Jan 2022 08:18) (135/65 - 166/72)  BP(mean): 89 (26 Jan 2022 08:18) (86 - 100)  RR: 32 (26 Jan 2022 09:50) (23 - 52)  SpO2: 92% (26 Jan 2022 09:50) (85% - 99%) I&O's Summary    25 Jan 2022 07:01  -  26 Jan 2022 07:00  --------------------------------------------------------  IN: 1230 mL / OUT: 700 mL / NET: 530 mL    26 Jan 2022 07:01  -  26 Jan 2022 10:23  --------------------------------------------------------  IN: 240 mL / OUT: 200 mL / NET: 40 mL        LABS:                        9.8    11.81 )-----------( 236      ( 26 Jan 2022 08:22 )             30.5   01-26    143  |  115<H>  |  39<H>  ----------------------------<  115<H>  4.7   |  20<L>  |  0.96    Ca    8.0<L>      26 Jan 2022 08:22  Phos  3.8     01-26  Mg     2.0     01-26        RADIOLOGY & ADDITIONAL STUDIES:      PROTEIN CALORIE MALNUTRITION PRESENT: [ ]mild [ ]moderate [ ]severe [ ]underweight [ ]morbid obesity  []PPSV2 < or = to 30% []significant weight loss  []poor nutritional intake []catabolic state []anasarca     Artificial Nutrition []     REFERRALS:  [x]Social Work  []Case management []PT/OT []Chaplaincy  []Hospice  []Patient/Family Support    DISCUSSION OF CASE: Family - to obtain additional history and to provide emotional support; ( ) -     Care Coordination/Goals of Care Document:                                          Progress Notes    PROGRESS NOTE  Date & Time of Note   2022-01-25 12:30    Notes    Notes: Chart reviewed. Case discussed during IDR. As per the MICU team, patient  remains medically acute in respiratory failure with plans to adjust steroid  regimen. CM remains available.       Electronically signed by:  Radha Burks  Electronically signed on:  2022-01-25  14:19           PALLIATIVE MEDICINE COORDINATION OF CARE DOCUMENTATION: [x] Inpatient Consult  Non-Face-to-Face prolonged service provided that relates to (face-to-face) care that has or will occur and ongoing patient management, including one or more of the following: - Reviewed documentation from other physicians and other health care professional services - Reviewed medical records and diagnostic / radiology study results - Coordination with patient's support system  ************************************************************************  MEDICATION REVIEW:  - See Medication List Above    ISTOP REFERENCE:   - no active Rx's / see ISTOP Chart Note  - PRN usage: NO PRN'S  ------------------------------------------------------------------------  COORDINATION OF CARE:  - Palliative Care consulted for: GO / Symptom Management  - Patient (to be) assessed: 1/21/22  - Patient previously seen by Palliative Care service: NO    ADVANCE CARE PLANNING  - Code status: FULL  - MOLST reviewed in chart: NONE; None found on Alpha  - HCP/Surrogate: Suki (Niece) [phone]  - [facility] documents: NONE found on [place]  - HCP/Living will/Other Advanced Directives in Alpha: NONE found on Alpha  ------------------------------------------------------------------------  CARE PROVIDER DOCUMENTATION:  - SW/CM notes: Remains medically active  -   -   -     PLAN OF CARE  - Known admissions in past year: 1  - Current admit date:  1/21/22  - LOS: 6  - LACE score: 13  - Current dispo plan: TO BE DETERMINED    01-15-22 (11d)  ------------------------------------------------------------------------  - Time Spent/Chart reviewed: 41 Minutes [including time used to gather, review and transfer data]  - Start: 1100  - End: 1141    Prolonged services rendered, as part of this patient's care provided by Palliative Medicine, include: i. chart review for provider and ancillary service documentation, ii. pertinent diagnostics including laboratory and imaging studies, iii. medication review including PRN use, iv. admission history including previous palliative care encounters and GOC notes, v. advance care planning documents including HCP and MOLST forms in Alpha. Part of Palliative Medicine extended evaluation and management also involves coordination of care with our IDT, the primary and consulting teams, and unit CM/SW and Hospice if eligible. Recommendations based on the information gathered and discussed are outlined in the A/P of Palliative notes.

## 2022-01-21 NOTE — CONSULT NOTE ADULT - PROBLEM SELECTOR RECOMMENDATION 2
- Currently receiving steroids, Remdesivir and condominant antibiotics for suspected bacterial PNA.  - Requiring HiFlow for treatment of hypoxia.   - Xray with evidence of bilateral infiltrates.

## 2022-01-21 NOTE — DISCHARGE NOTE PROVIDER - NSDCFUADDAPPT_GEN_ALL_CORE_FT
Please bring your Insurance card, Photo ID, Covid-19 vaccination card (if applicable) and Discharge paperwork to the following appointment:    (1) Please follow up with your Vascular Surgery Provider, Dr. Cathy Montero at [address] [place] [address], [address] [place] [address] on 02/03/2022 at 12:30pm.    Appointment was scheduled by Ms. AUNG Gil, Referral Coordinator.

## 2022-01-21 NOTE — CONSULT NOTE ADULT - PROBLEM SELECTOR RECOMMENDATION 5
- Patient with underlying dementia, other chronic comorbidities, presents with fall, found to have respiratory failure in the setting of COVID PNA.  - Palliative care following for symptoms and GOC.  - Patient is DNR.  - Will readdress goals depending on clinical course.

## 2022-01-21 NOTE — DISCHARGE NOTE PROVIDER - HOSPITAL COURSE
#Discharge: do not delete    89 y/o M with PMHx of Dementia (A & O x 1-2 self/place), HTN, colon cancer 2012 (s/p left hemicolectomy - chemoport in place), bilateral carotid artery stenosis (s/p stents 2019), osteoarthritis of hands, and hx of gait instability who presented to [facility] ED BIBEMS on 1/15/22 after fall from bed, admitted for syncopal workup (negative) w course c/b acute hypoxic respiratory failure 2/2 covid pna.    Problem List/Main Diagnoses (system-based):     #Acute respiratory failure with hypoxia.   2/2 COVID PNA. Patient + for COVID-19 on admission. Hypoxic w need forO2 via NC. No increased work of breathing but did have cough. Treated w remdesivir and decadron    #2019 novel coronavirus disease (COVID-19).   see above    #Acute kidney injury superimposed on CKD.   Likely pre renal in setting of poor PO intake. Cr improved w fluids, PO intake, and holding ACEI.    #Pre-syncope.   As per outpatient notes, patient has hx of feeling off balance - was attributed to combination of neurodegenerative process, encephalomalacia, spinal stenosis. CT Head 1/15/22: No acute intracranial hemorrhage or calvarial fracture. Neuro consulted - vEEG negative. TTE w normal systolic function and no valvular abnormalities. Possibly due to weakness in setting of COVID, poor PO intake, and mechanical fall.    #Transaminitis.   Had mildly elevated liver enzymes likely 2/2 covid. No intervention    #Anemia.   Hgb on admission 10.9, currently no signs of active bleeding (no hematochezia, melena, hemoptysis, hematuria). Iron panel consistent w anemia of chronic disease. Folate normal, b12 elevated. Hgb stable.    #Carotid artery stenosis.   Hx of carotid artery stenosis; s/p R PTA in 2019 at [facility]. Per Vascular, fall unlikely related to carotid artery disease. Patient will follow-up with Dr. Balbuena. Continued w Plavix 75mg and  Eliquis 5mg BID. Held statin in setting of elevated liver enzymes.    #Atrial fibrillation.   Unclear if new afib - not on A/C, not in RVR. Received eliquis 5mg BID, no BB as intermittently bradycardic      Inpatient treatment course: decadron and remdesivir for covid, received O2 via NC,eliquis for a fib  New medications:   Labs to be followed outpatient: CBC  Exam to be followed outpatient: Consider anemia workup

## 2022-01-21 NOTE — CONSULT NOTE ADULT - PROBLEM SELECTOR RECOMMENDATION 9
- Patient with severe respiratory failure in the setting of COVID PNA.  - Currently receiving steroids, Remdesivir and condominant antibiotics for suspected bacterial PNA.  - Requiring HiFlow for treatment of hypoxia.   - Xray with evidence of bilateral infiltrates.   - DNR/DNI as per family wishes, as per discussion by primary team.  - If respiratory status worsens, and is not responsive to HiFLow, low dose opiates such as Morphine 1mg IV Q2 hours prn is appropriate.

## 2022-01-21 NOTE — PROGRESS NOTE ADULT - PROBLEM SELECTOR PLAN 10
Fluids: s/p 500cc bolus   Electrolytes: Mg>2, K>4  Nutrition:  DASH/TLC  Prophylaxis: Eliquis 5mg BID  Activity: AAT, OOBTC  GI: none  C: FC  Dispo: JASON SHEILA

## 2022-01-21 NOTE — DISCHARGE NOTE PROVIDER - NSDCMRMEDTOKEN_GEN_ALL_CORE_FT
Aspirin Enteric Coated 81 mg oral delayed release tablet: 1 tab(s) orally once a day  enalapril 10 mg oral tablet: 1 tab(s) orally once a day  Plavix 75 mg oral tablet: 1 tab(s) orally once a day  rosuvastatin 5 mg oral capsule: 1 cap(s) orally once a day  tamsulosin 0.4 mg oral capsule: 1 cap(s) orally once a day

## 2022-01-21 NOTE — DISCHARGE NOTE PROVIDER - PROVIDER TOKENS
PROVIDER:[TOKEN:[[id number]],FOLLOWUP:[1 week]] PROVIDER:[TOKEN:[[id number]],SCHEDULEDAPPT:[02/03/2022],SCHEDULEDAPPTTIME:[12:30 PM]]

## 2022-01-21 NOTE — DISCHARGE NOTE PROVIDER - CARE PROVIDERS DIRECT ADDRESSES
[email or web address]

## 2022-01-22 NOTE — PROGRESS NOTE ADULT - PROBLEM SELECTOR PLAN 1
2/2 COVID PNA. Patient + for COVID-19 on admission, today 6L NC. Desats when NC removed. No increased work of breathing but does have cough.   -c/w remdesivir and decadron 1/18  -incentive spirometer, OOBTC as tolerated  -Needs more time to improve

## 2022-01-22 NOTE — PROGRESS NOTE ADULT - SUBJECTIVE AND OBJECTIVE BOX
INTERVAL HPI/OVERNIGHT EVENTS:  Patient was seen and examined at bedside. Denies any CP, SOB, abd pain, N/V. Requiring 6L but not looking uncomfortable, no breathign complaints.     VITAL SIGNS:  On 6LNC. Otherwise wnl.     PHYSICAL EXAM:    Constitutional: Elderly m awake in bed, disoriented  HEENT: MMM, on 6L NC  Respiratory: no increased work of breathing, coughing during exam  Cardiovascular: RRR,  Gastrointestinal: normoactive bowel sounds, soft, NTND, no masses palpable  Extremities: no edema  Neurological: AAOx3  Skin: Normal temperature, warm, dry      MEDICATIONS  (STANDING):  amLODIPine   Tablet 5 milliGRAM(s) Oral daily  apixaban 5 milliGRAM(s) Oral two times a day  clopidogrel Tablet 75 milliGRAM(s) Oral daily  dexAMETHasone  Injectable 6 milliGRAM(s) IV Push every 24 hours  insulin lispro (ADMELOG) corrective regimen sliding scale   SubCutaneous Before meals and at bedtime  polyethylene glycol 3350 17 Gram(s) Oral every 12 hours  remdesivir  IVPB 100 milliGRAM(s) IV Intermittent every 24 hours  remdesivir  IVPB   IV Intermittent   senna 2 Tablet(s) Oral at bedtime  tamsulosin 0.4 milliGRAM(s) Oral at bedtime    MEDICATIONS  (PRN):  acetaminophen     Tablet .. 650 milliGRAM(s) Oral every 6 hours PRN Temp greater or equal to 38C (100.4F)  benzonatate 100 milliGRAM(s) Oral three times a day PRN Cough      Allergies    No Known Allergies    Intolerances        LABS:     reviewed, acceptable        RADIOLOGY & ADDITIONAL TESTS:  Reviewed

## 2022-01-22 NOTE — PROGRESS NOTE ADULT - PROBLEM SELECTOR PLAN 7
Hx of carotid artery stenosis; s/p R PTA in 2019 at [facility]. Vascular consulted- carotid duplex reviewed w/ Dr. Balbuena and chief resident, no need for acute surgical intervention at this time  -Per Vascular, fall unlikely related to carotid artery disease  -Patient should follow-up with Dr. Balbuena 1 week after discharge. Please call [phone] to schedule an appointment  -continue Plavix 75mg and  Eliquis 5mg BID   -c/w lipitor upon discharge, holding for now in setting of transaminitis

## 2022-01-22 NOTE — PROGRESS NOTE ADULT - ASSESSMENT
89 y/o M, POOR HISTORIAN, with PMHx of Dementia (A & O x 1-2 self/place), HTN, colon cancer 2012 (s/p left hemicolectomy - chemoport in place), bilateral carotid artery stenosis (s/p stents 2019), osteoarthritis of hands, and hx of gait instability who presented to [facility] ED BIBEMS on 1/15/22 after fall from bed, admitted for syncopal workup (negative) w course c/b acute hypoxic respiratory failure 2/2 covid pna.

## 2022-01-23 NOTE — CONSULT NOTE ADULT - ASSESSMENT
89 y/o M, POOR HISTORIAN, with PMHx of Dementia (A & O x 1-2 self/place), HTN, colon cancer 2012 (s/p left hemicolectomy - chemoport in place), bilateral carotid artery stenosis (s/p stents 2019), osteoarthritis of hands, and hx of gait instability who presented to [facility] ED BIBEMS on 1/15/22 after fall from bed, admitted for syncopal workup, found to be COVID+. Originally admitted to cardiac tele for syncope with negative work up and then transferred to [facility] 1/18/22 on NC. ICU consulted for increasing oxygen requirements.     #Acute hypoxic respiratory failure 2/2 COVID PNA  Admitted on 1/15 (COVID+), s/p remdesivir, currently on decadron. Exam and diagnostics consistent with worsening COVID PNA. CXR with worsening bilateral infiltrates and ABG with respiratory alkalosis. DNR/DNI.   - Recommend Venti Mask (50%) with spO2 goal >90%; if failing, can switch to HFNC  - Recommend tocilizumab 800mg x1 (no contraindications)  - OK to continue empiric abx for HAP but low suspicion and would discontinue if MRSA neg and blood cultures neg    Remainder of care per primary team  Case discussed with attending  Dispo: COVID Tele

## 2022-01-23 NOTE — PROGRESS NOTE ADULT - ASSESSMENT
89 y/o M, POOR HISTORIAN, with PMHx of Dementia (A & O x 1-2 self/place), HTN, colon cancer 2012 (s/p left hemicolectomy - chemoport in place), bilateral carotid artery stenosis (s/p stents 2019), osteoarthritis of hands, and hx of gait instability who presented to [facility] ED BIBEMS on 1/15/22 after fall from bed, admitted for syncopal workup (negative) w course c/b acute hypoxic respiratory failure 2/2 covid pna vs HAP now stepped up to covid telemetry.

## 2022-01-23 NOTE — CONSULT NOTE ADULT - SUBJECTIVE AND OBJECTIVE BOX
[facility] SERVICE CONSULTATION NOTE    CC:    HPI:     ROS:  Otherwise negative, except as specified in HPI.    PMH:    PSH:    FH:    SH:    ALLERGIES:    MEDICATIONS:    VITAL SIGNS:  ICU Vital Signs Last 24 Hrs  T(C): 36.4 (23 Jan 2022 06:02), Max: 36.6 (22 Jan 2022 20:28)  T(F): 97.6 (23 Jan 2022 06:02), Max: 97.9 (22 Jan 2022 20:28)  HR: 65 (23 Jan 2022 06:02) (62 - 78)  BP: 156/62 (23 Jan 2022 06:02) (142/74 - 156/62)  BP(mean): --  ABP: --  ABP(mean): --  RR: 18 (23 Jan 2022 06:02) (18 - 20)  SpO2: 91% (23 Jan 2022 06:02) (91% - 93%)    CAPILLARY BLOOD GLUCOSE      POCT Blood Glucose.: 88 mg/dL (23 Jan 2022 08:17)      PHYSICAL EXAM:  Constitutional: resting comfortably in bed, NAD  HEENT: NC/AT; PERRL, anicteric sclera; no oropharyngeal erythema or exudates; MMM  Neck: supple, no appreciable JVD  Respiratory: CTA B/L, no W/R/R; respirations appear non-labored, conversive in full sentences  Cardiovascular: +S1/S2, RRR  Gastrointestinal: abdomen soft, NT/ND  Extremities: WWP; no clubbing, cyanosis or edema  Vascular: 2+ radial, femoral, and DP/PT pulses B/L  Dermatologic: skin normal color and turgor; no visible rashes  Neurological:     LABS:                        11.9   12.01 )-----------( 284      ( 23 Jan 2022 08:05 )             35.8     01-23    141  |  111<H>  |  37<H>  ----------------------------<  96  5.1   |  19<L>  |  1.05    Ca    8.1<L>      23 Jan 2022 08:05  Phos  3.2     01-23  Mg     2.3     01-23    TPro  6.0  /  Alb  2.8<L>  /  TBili  1.7<H>  /  DBili  x   /  AST  40  /  ALT  44  /  AlkPhos  88  01-23                  EKG: Reviewed.    RADIOLOGY & ADDITIONAL TESTS: Reviewed. [facility] SERVICE CONSULTATION NOTE    CC: hypoxia     HPI:   89 y/o M, POOR HISTORIAN, with PMHx of Dementia (A & O x 1-2 self/place), HTN, colon cancer 2012 (s/p left hemicolectomy - chemoport in place), bilateral carotid artery stenosis (s/p stents 2019), osteoarthritis of hands, and hx of gait instability who presented to [facility] ED BIBEMS on 1/15/22 after fall from bed, admitted for syncopal workup, found to be COVID+. Neurology was consulted for worsening confusion with negative VEEGn thus far. His ischemic eval has been unremarkable pending TTE, his course was further complicated by acute hypoxemic respiratory failure 2/2 COVID-19 PNA,  requiring supplemental 02. Patient was transferred from cardiac telemetry on 1/18 to [facility] for further management of COVID PNA. He is s/p remdesivir and currently on decadron. Today patient was hypoxic on 4L NN, still hypoxic to mid 80s on 6L, then saturated 95% on NRB.     ICU consulted in setting of increased O2 requirements and increased work of breathing. ABG showed respiratory alkalosis. CXR showed worsening b/l opacifications, and he was started on vanc/zosyn for coverage of HAP. Patient stepped up to [facility] telemetry for closer vital signs monitoring and use of HFNC i/s/o worsening covid PNA with potential superimposed HAP.     ROS:  Otherwise negative, except as specified in HPI.    PMH: as  above    PSH: as above    FH: as above    SH: unknown    ALLERGIES:    MEDICATIONS:    VITAL SIGNS:  ICU Vital Signs Last 24 Hrs  T(C): 36.4 (23 Jan 2022 06:02), Max: 36.6 (22 Jan 2022 20:28)  T(F): 97.6 (23 Jan 2022 06:02), Max: 97.9 (22 Jan 2022 20:28)  HR: 65 (23 Jan 2022 06:02) (62 - 78)  BP: 156/62 (23 Jan 2022 06:02) (142/74 - 156/62)  BP(mean): --  ABP: --  ABP(mean): --  RR: 18 (23 Jan 2022 06:02) (18 - 20)  SpO2: 91% (23 Jan 2022 06:02) (91% - 93%)    CAPILLARY BLOOD GLUCOSE      POCT Blood Glucose.: 88 mg/dL (23 Jan 2022 08:17)      PHYSICAL EXAM:  Constitutional: mild acute distress  HEENT: NC/AT; PERRL, anicteric sclera; no oropharyngeal erythema or exudates; MMM  Neck: supple, no appreciable JVD  Respiratory: CTA B/L, no W/R/R; slightly tachypneic but no accessory muscle use  Cardiovascular: +S1/S2, RRR  Gastrointestinal: abdomen soft, NT/ND  Extremities: WWP; no clubbing, cyanosis or edema  Vascular: 2+ radial, femoral, and DP/PT pulses B/L  Dermatologic: skin normal color and turgor; no visible rashes  Neurological: AOx2    LABS:                        11.9   12.01 )-----------( 284      ( 23 Jan 2022 08:05 )             35.8     01-23    141  |  111<H>  |  37<H>  ----------------------------<  96  5.1   |  19<L>  |  1.05    Ca    8.1<L>      23 Jan 2022 08:05  Phos  3.2     01-23  Mg     2.3     01-23    TPro  6.0  /  Alb  2.8<L>  /  TBili  1.7<H>  /  DBili  x   /  AST  40  /  ALT  44  /  AlkPhos  88  01-23                  EKG: Reviewed.    RADIOLOGY & ADDITIONAL TESTS: Reviewed.

## 2022-01-23 NOTE — CONSULT NOTE ADULT - ATTENDING COMMENTS
89yo M with carotid artery stenosis, CAD admitted with syncope and found to be covid-19 positive on 1/15. initially admitted to cardiac tele, then transferred to medicine and now for the last 2 days with worsening oxygen requirements. will start patient on venti mask/HFNC, give toci. can give one dose of vanc and zosyn however low suspicion for superimposed bacterial infection given mildly elevated procal, no significant change in WBC count. if repeat cultures are negative, will dc abx. dc eliquis since risk of falls. patient DNR/DNI.

## 2022-01-23 NOTE — PROVIDER CONTACT NOTE (CHANGE IN STATUS NOTIFICATION) - BACKGROUND
Patient previously saturating WDL on 4L NC. Increased to 6L NC for increased WOB, vitals taken and found patient saturating in low 80s. Patient placed on 15L nonrebreather saturating >95%, MD Romo called to bedside.

## 2022-01-23 NOTE — GOALS OF CARE CONVERSATION - ADVANCED CARE PLANNING - CONVERSATION DETAILS
Discussed how her uncle's respiratory status is worsening and whether or not she believes he would want CPR if his heart were to stop, or intubation and mechanical ventilation if his respiratory status continued to worsen. She requested him to be DNR and DNI. We also discussed high flow nasal cannula and bipap which she is ok with, but would like updates if things were to change.

## 2022-01-23 NOTE — PROGRESS NOTE ADULT - SUBJECTIVE AND OBJECTIVE BOX
CRISS GARCIA, 89y, Male  MRN-[id number]  Patient is a 89y old  Male who presents with a chief complaint of Fall (23 Jan 2022 11:52)    ****Accepted Transfer from [facility] to 3[facility] Tele****    Hospital course: 89 y/o M, POOR HISTORIAN, with PMHx of Dementia (A & O x 1-2 self/place), HTN, colon cancer 2012 (s/p left hemicolectomy - chemoport in place), bilateral carotid artery stenosis (s/p stents 2019), osteoarthritis of hands, hx of imbalance , who presented to [facility] ED BIBEMS on 1/15/22 after fall from bed vs. syncope, admitted to cardiac tele for syncopal workup, found to be COVID+. Regarding syncope work-up, patient underwent CT Head 1/15/22: No acute intracranial hemorrhage or calvarial fracture, vEEG showed no epileptiform activity. CT Neck/Cervical 1/15/22:  demonstrates a right carotid stent in place with narrowing of its lumen. Vascular consulted- carotid duplex reviewed w/ Dr. Balbuena and chief resident, no need for acute surgical intervention at this time, and fall unlikely related to carotid artery disease. Echo showed normal systolic function w no major valvular abnormalities. Telemetry without any significant arrhythmia, however Patient was found to be in rate-controlled Afib (unclear if new, but pt not on AC). He was started on eliquis 5mg BID then discontinued due to fall risk. Regarding COVID, patient was intermittently spiking fevers but not hypoxic. On 1/18AM, pt noted to desat to high 80s on RA, requiring 2-3L NC. Patient was started on decadron and remdesevir and accepted to COVID medicine under Dr. Redding. Remdesivir d/c'd in setting of JUAN LUIS. Today (1/23) patient was hypoxic on 4L NC, still hypoxic to mid 80s on 6L, then saturated 95% on NRB. ICU consulted in setting of increased O2 requirements and increased work of breathing. ABG showed respiratory alkalosis. CXR showed worsening b/l opacifications, and he was started on vanc/zosyn for coverage of HAP. Patient stepped up to [facility] telemetry for closer vital signs monitoring and use of HFNC i/s/o worsening covid PNA vs HAP.      SUBJECTIVE: Patient seen/examined at bedside. No acute complaints at this time.     12 Point ROS Negative unless noted otherwise above.  -------------------------------------------------------------------------------  VITAL SIGNS:  Vital Signs Last 24 Hrs  T(C): 36.6 (23 Jan 2022 08:40), Max: 36.6 (22 Jan 2022 20:28)  T(F): 97.9 (23 Jan 2022 08:40), Max: 97.9 (22 Jan 2022 20:28)  HR: 67 (23 Jan 2022 12:20) (63 - 78)  BP: 169/74 (23 Jan 2022 08:40) (147/72 - 169/74)  BP(mean): --  RR: 34 (23 Jan 2022 12:20) (18 - 34)  SpO2: 100% (23 Jan 2022 12:20) (91% - 100%)  I&O's Summary    22 Jan 2022 07:01  -  23 Jan 2022 07:00  --------------------------------------------------------  IN: 0 mL / OUT: 350 mL / NET: -350 mL    23 Jan 2022 07:01  -  23 Jan 2022 13:06  --------------------------------------------------------  IN: 0 mL / OUT: 250 mL / NET: -250 mL        PHYSICAL EXAM:    General: Elderly male, sitting in bed, on HFNC   HEENT: NC/AT, anicteric sclera  Cardiovascular: RRR, +S1/S2; NO M/R/G  Respiratory: CTA B/L; no W/R/R; coughing during exam   Gastrointestinal: soft, NT/ND; +BSx4  Extremities: WWP; no edema or cyanosis  Vascular: 2+ radial, DP/PT pulses B/L  Neurological: AAOx1-2 at baseline     ALLERGIES:  Allergies    No Known Allergies    Intolerances    MEDICATIONS:  MEDICATIONS  (STANDING):  amLODIPine   Tablet 5 milliGRAM(s) Oral daily  clopidogrel Tablet 75 milliGRAM(s) Oral daily  dexAMETHasone  Injectable 6 milliGRAM(s) IV Push every 24 hours  enoxaparin Injectable 40 milliGRAM(s) SubCutaneous at bedtime  insulin lispro (ADMELOG) corrective regimen sliding scale   SubCutaneous Before meals and at bedtime  piperacillin/tazobactam IVPB.. 4.5 Gram(s) IV Intermittent every 6 hours  polyethylene glycol 3350 17 Gram(s) Oral every 12 hours  senna 2 Tablet(s) Oral at bedtime  tamsulosin 0.4 milliGRAM(s) Oral at bedtime  tocilizumab (EUA) IVPB 500 milliGRAM(s) IV Intermittent once  vancomycin  IVPB 1000 milliGRAM(s) IV Intermittent once    MEDICATIONS  (PRN):  acetaminophen     Tablet .. 650 milliGRAM(s) Oral every 6 hours PRN Temp greater or equal to 38C (100.4F)  benzonatate 100 milliGRAM(s) Oral three times a day PRN Cough      -------------------------------------------------------------------------------  LABS:                        11.9   12.01 )-----------( 284      ( 23 Jan 2022 08:05 )             35.8     01-23    141  |  111<H>  |  37<H>  ----------------------------<  96  5.1   |  19<L>  |  1.05    Ca    8.1<L>      23 Jan 2022 08:05  Phos  3.2     01-23  Mg     2.3     01-23    TPro  6.0  /  Alb  2.8<L>  /  TBili  1.7<H>  /  DBili  x   /  AST  40  /  ALT  44  /  AlkPhos  88  01-23    LIVER FUNCTIONS - ( 23 Jan 2022 08:05 )  Alb: 2.8 g/dL / Pro: 6.0 g/dL / ALK PHOS: 88 U/L / ALT: 44 U/L / AST: 40 U/L / GGT: x               CAPILLARY BLOOD GLUCOSE      POCT Blood Glucose.: 88 mg/dL (23 Jan 2022 08:17)      COVID-19 PCR: Detected (22 Jan 2022 08:05)  COVID-19 PCR: Positive (15 Macario 2022 04:58)      RADIOLOGY & ADDITIONAL TESTS: Reviewed.

## 2022-01-23 NOTE — PROGRESS NOTE ADULT - ASSESSMENT
89 y/o M, POOR HISTORIAN, with PMHx of Dementia (A & O x 1-2 self/place), HTN, colon cancer 2012 (s/p left hemicolectomy - chemoport in place), bilateral carotid artery stenosis (s/p stents 2019), osteoarthritis of hands, and hx of gait instability who presented to [facility] ED BIBEMS on 1/15/22 after fall from bed, admitted for syncopal workup (negative) w course c/b acute hypoxic respiratory failure 2/2 covid pna vs HAP now stepped up to covid telemetry. Pt made DNR/DNI by sharon. On HFNC and started on Abx.    Neurology    #Dementia  Pt AAOx1-2 at baseline, poor historian. No changes in mental status noted    Pulmonary    #Acute respiratory failure with hypoxia.    2/2 COVID PNA vs HAP. Increasing O2 requirements and work of breathing.  ABG showing respiratory alkalosis and worsening opacifications on CXR. Patient noted to have trouble clearing when he coughs, so suspicion for HAP. Started on Vanc/zosyn on 1/23.  Now on NRB and transferredto 3Wo. Pt transitioned to HFNC.   -c/w decadron 1/18 - 1/27  - c/w vancomycin, next trough 1/24 pm (started 1/23-) -- will DC if MRSA swab negative   - c/w zosyn (1/23-)  - incentive spirometer    Cardiology    # Carotid artery stenosis.    Hx of carotid artery stenosis; s/p R PTA in 2019 at [facility]. Vascular consulted- carotid duplex reviewed w/ Dr. Balbuena and chief resident, no need for acute surgical intervention at this time  -Per Vascular, fall unlikely related to carotid artery disease  -Patient should follow-up with Dr. Balbuena 1 week after discharge. Please call [phone] to schedule an appointment  -continue Plavix 75mg and  Eliquis 5mg BID   -c/w lipitor upon discharge, holding for now in setting of transaminitis.    # Atrial fibrillation.   - Unclear if new afib - not on A/C, not in RVR  - CHADsVASc: 4   - d/c eliquis due to fall risk.    # HTN (hypertension).   -c/w Amlodipine 5 mg once daily with holding parameters  -d/c ACEI in setting of soft BP and JUAN LUIS.    # Syncope.    As per outpatient notes, patient has hx of feeling off balance - was attributed to combination of neurodegenerative process, encephalomalacia, spinal stenosis.  - CT Head 1/15/22: No acute intracranial hemorrhage or calvarial fracture.   - Neuro consulted - vEEG negative  - TTE w normal systolic function and no valvular abnormalities.    Renal    # Acute kidney injury superimposed on CKD.    Patient with hx of CKD as per MD note - CKD stage 2, Cr improving after bolus, PO intake, and holding ACEI. Cr improving.  - Continue to trend, avoid nephrotoxic agents  - hold ACEI in setting of JUAN LUIS and soft BPs  - holding remdesivir.    Abdominal    #No acute interventions    Infectious Disease    #COVID-19 PNA v HAP   Pt with worsening/increasing O2 requirements. Pt started on Vancomycin + Zosyn on 1/23 for presumed HAP given worsening CXR however patient afebrile with no leukocytosis noted. Pt stepped up from RMF to [facility] Tele. Now on HFNC   - c/w Decadron (1/17-1/28)  - holding remdesivir in setting of JUAN LUIS  - Repeat blood cultures sent -- f/u results   - MRSA swab sent -- DC vancomycin if negative  - C/w Zosyn    Heme    #Anemia.   Hgb on admission 10.9, currently no signs of active bleeding (no hematochezia, melena, hemoptysis, hematuria). Iron panel consistent w anemia of chronic disease. Folate normal, b12 elevated. Hgb stable.  - trend CBC  - maintain active T&S  - transfuse if Hgb <7    F: as needed  E: replete as needed, keep K>4, Mg >2  D: DASH/TLC  GI Proph: Protonix  Dispo: RMF to [facility] Tele

## 2022-01-23 NOTE — PROGRESS NOTE ADULT - PROBLEM SELECTOR PLAN 7
Hx of carotid artery stenosis; s/p R PTA in 2019 at [facility]. Vascular consulted- carotid duplex reviewed w/ Dr. Balbuena and chief resident, no need for acute surgical intervention at this time  -Per Vascular, fall unlikely related to carotid artery disease  -Patient should follow-up with Dr. Balbuena 1 week after discharge. Please call [phone] to schedule an appointment  -continue Plavix 75mg and  Eliquis 5mg BID   -c/w lipitor upon discharge, holding for now in setting of transaminitis

## 2022-01-23 NOTE — PROGRESS NOTE ADULT - PROBLEM SELECTOR PLAN 10
Electrolytes: Mg>2, K>4  Nutrition:  DASH/TLC  Prophylaxis: lovenox covid dosing  Activity: AAT, OOBTC  GI: none  C: GAMALIEL  Dispo: SHEILA GOMEZ

## 2022-01-23 NOTE — PROGRESS NOTE ADULT - SUBJECTIVE AND OBJECTIVE BOX
Transfer from [facility] to [address] [place] [address]    Hospital course: 89 y/o M, POOR HISTORIAN, with PMHx of Dementia (A & O x 1-2 self/place), HTN, colon cancer 2012 (s/p left hemicolectomy - chemoport in place), bilateral carotid artery stenosis (s/p stents 2019), osteoarthritis of hands, hx of imbalance , who presented to [facility] ED BIBEMS on 1/15/22 after fall from bed vs. syncope, admitted to cardiac tele for syncopal workup, found to be COVID+. Regarding syncope work-up, patient underwent CT Head 1/15/22: No acute intracranial hemorrhage or calvarial fracture, vEEG showed no epileptiform activity. CT Neck/Cervical 1/15/22:  demonstrates a right carotid stent in place with narrowing of its lumen. Vascular consulted- carotid duplex reviewed w/ Dr. Balbuena and chief resident, no need for acute surgical intervention at this time, and fall unlikely related to carotid artery disease. Echo showed normal systolic function w no major valvular abnormalities. Telemetry without any significant arrhythmia, however Patient was found to be in rate-controlled Afib (unclear if new, but pt not on AC). He was started on eliquis 5mg BID then discontinued due to fall risk. Regarding COVID, patient was intermittently spiking fevers but not hypoxic. On 1/18AM, pt noted to desat to high 80s on RA, requiring 2-3L NC. Patient was started on decadron and remdesevir and accepted to COVID medicine under Dr. Redding. Miko d/c'd in setting of JUAN LUIS. Today patient was hypoxic on 4L NN, still hypoxic to mid 80s on 6L, then saturated 95% on NRB. ICU consulted in setting of increased O2 requirements and increased work of breathing. ABG showed respiratory alkalosis. CXR showed worsening b/l opacifications, and he was started on vanc/zosyn for coverage of HAP. Patient stepped up to [facility] telemetry for closer vital signs monitoring and use of HFNC i/s/o worsening covid PNA vs HAP.      INTERVAL HPI/OVERNIGHT EVENTS:  Patient was seen and examined at bedside. Patient hypoxic on 4L in the morning (see above course and transfer note). He complained of trouble breathing and cough. No chest pain, abd pain, N/V.     VITAL SIGNS:  T(F): 97.9 (01-23-22 @ 08:40)  HR: 66 (01-23-22 @ 08:40)  BP: 169/74 (01-23-22 @ 08:40)  RR: 26 (01-23-22 @ 08:40)  SpO2: 98% (01-23-22 @ 08:40)  Wt(kg): --    PHYSICAL EXAM:    Constitutional: Elderly m awake sitting upright in bed w increased work of breathing  HEENT: MMM, NRB  Respiratory: faint inspiratory crackles, coughing during exam  Cardiovascular: RRR, normal S1S2, no extra heart sounds, no murmur  Gastrointestinal:soft, NTND  Extremities: Warm, well perfused, no edema  Neurological: AAOx1-2 to person and place (hospital), CN Grossly intact  Skin: Normal temperature, warm, dry    MEDICATIONS  (STANDING):  amLODIPine   Tablet 5 milliGRAM(s) Oral daily  clopidogrel Tablet 75 milliGRAM(s) Oral daily  dexAMETHasone  Injectable 6 milliGRAM(s) IV Push every 24 hours  enoxaparin Injectable 40 milliGRAM(s) SubCutaneous at bedtime  insulin lispro (ADMELOG) corrective regimen sliding scale   SubCutaneous Before meals and at bedtime  piperacillin/tazobactam IVPB.. 4.5 Gram(s) IV Intermittent every 6 hours  polyethylene glycol 3350 17 Gram(s) Oral every 12 hours  senna 2 Tablet(s) Oral at bedtime  tamsulosin 0.4 milliGRAM(s) Oral at bedtime  vancomycin  IVPB 1000 milliGRAM(s) IV Intermittent once    MEDICATIONS  (PRN):  acetaminophen     Tablet .. 650 milliGRAM(s) Oral every 6 hours PRN Temp greater or equal to 38C (100.4F)  benzonatate 100 milliGRAM(s) Oral three times a day PRN Cough      Allergies    No Known Allergies    Intolerances        LABS:                        11.9   12.01 )-----------( 284      ( 23 Jan 2022 08:05 )             35.8     01-23    141  |  111<H>  |  37<H>  ----------------------------<  96  5.1   |  19<L>  |  1.05    Ca    8.1<L>      23 Jan 2022 08:05  Phos  3.2     01-23  Mg     2.3     01-23    TPro  6.0  /  Alb  2.8<L>  /  TBili  1.7<H>  /  DBili  x   /  AST  40  /  ALT  44  /  AlkPhos  88  01-23            RADIOLOGY & ADDITIONAL TESTS:  Reviewed

## 2022-01-23 NOTE — CHART NOTE - NSCHARTNOTEFT_GEN_A_CORE
This morning, patient had increasing O2 requirements from 4 L NC to 6 L NC. Was still saturating in the low-mid 80s on 6L NC, per nurse, requiring placement on NRB. Saw patient at bedside. Patient tachypneic, but denied chest pain or increased WOB. Still has significant cough. Mental status at baseline (AAOx1-2, poor insight). Attempted to wean patient back to 6L NC but desaturated to low-mid 80s and was placed back on NRB. Urgent CXR showed worsening of b/l infiltrates, patient was started on Vanc and Zosyn for overlying HAP coverage. ABG obtained which showed respiratory alkalosis. ICU consulted for increased WOB and increased O2 requirements. Plan for patient to be stepped-up to COVID tele for closer monitoring and likely need for HFNC.   Plan discussed with attending. See progress note/ICU consult note for further details.    Dr. Menendez spoke to sharon (HCP) Suki with updates. Patient made DNR/DNI. Suki understood plan for HFNC/BiPAP and need for increased level of care at this time. This morning, patient had increasing O2 requirements from 4 L NC to 6 L NC. Was still saturating in the low-mid 80s on 6L NC, per nurse, requiring placement on NRB. Saw patient at bedside. Patient tachypneic, but denied chest pain or increased WOB. Still has significant cough. Mental status at baseline (AAOx1-2, poor insight). Attempted to wean patient back to 6L NC but desaturated to low-mid 80s and was placed back on NRB. Urgent CXR showed worsening of b/l infiltrates, patient was started on Vanc and Zosyn for possible HAP/aspiration coverage. ABG obtained which showed respiratory alkalosis. ICU consulted for increased WOB and increased O2 requirements. Plan for patient to be stepped-up to COVID tele for closer monitoring and need for HFNC.   Plan discussed with attending. See progress note/ICU consult note for further details.    Dr. Menendez spoke to sharon (HCP) Suki with updates. Patient made DNR/DNI. Suki understood plan for HFNC/BiPAP and need for increased level of care at this time.

## 2022-01-23 NOTE — PROGRESS NOTE ADULT - PROBLEM/PLAN-10
Done.  Fax then send to scanning  
DISPLAY PLAN FREE TEXT

## 2022-01-24 NOTE — PROGRESS NOTE ADULT - SUBJECTIVE AND OBJECTIVE BOX
CRISS GARCIA, 89y, Male  MRN-[id number]  Patient is a 89y old  Male who presents with a chief complaint of Fall (24 Jan 2022 07:19)      OVERNIGHT EVENTS: Patient agitated and pulling at HFNC overnight. Given Seroquel 12.5mg PO and placed on mittens.     SUBJECTIVE: Pt seen/evaluated at bedside. Speaking in full sentences and redirected. Pt oriented to self and place however not time. Denied acute pain.     12 Point ROS Negative unless noted otherwise above.  -------------------------------------------------------------------------------  VITAL SIGNS:  Vital Signs Last 24 Hrs  T(C): 36.2 (24 Jan 2022 06:31), Max: 36.2 (24 Jan 2022 06:31)  T(F): 97.2 (24 Jan 2022 06:31), Max: 97.2 (24 Jan 2022 06:31)  HR: 60 (24 Jan 2022 05:55) (60 - 91)  BP: 155/68 (24 Jan 2022 05:17) (151/68 - 164/74)  BP(mean): 98 (24 Jan 2022 05:17) (97 - 106)  RR: 24 (24 Jan 2022 05:55) (20 - 34)  SpO2: 95% (24 Jan 2022 05:55) (91% - 100%)  I&O's Summary    23 Jan 2022 07:01  -  24 Jan 2022 07:00  --------------------------------------------------------  IN: 440 mL / OUT: 1200 mL / NET: -760 mL        PHYSICAL EXAM:    General: thin/frail; laying in bed on HFNC; bilateral mittens in place   HEENT: NC/AT;  anicteric sclera; dry mucosal membranes.  Cardiovascular: RRR, +S1/S2; NO M/R/G  Respiratory: CTA B/L; no W/R/R  Gastrointestinal: soft, NT/ND; +BSx4  Genitourinary: condom cath in place   Extremities: WWP; no edema or cyanosis  Vascular: 2+ radial, DP/PT pulses B/L  Neurological: AAOx2 to self and place     ALLERGIES:  Allergies    No Known Allergies    Intolerances    MEDICATIONS:  MEDICATIONS  (STANDING):  amLODIPine   Tablet 10 milliGRAM(s) Oral every 24 hours  clopidogrel Tablet 75 milliGRAM(s) Oral daily  dexAMETHasone  Injectable 6 milliGRAM(s) IV Push every 24 hours  enoxaparin Injectable 40 milliGRAM(s) SubCutaneous at bedtime  insulin lispro (ADMELOG) corrective regimen sliding scale   SubCutaneous Before meals and at bedtime  pantoprazole    Tablet 40 milliGRAM(s) Oral before breakfast  polyethylene glycol 3350 17 Gram(s) Oral every 12 hours  QUEtiapine 12.5 milliGRAM(s) Oral at bedtime  senna 2 Tablet(s) Oral at bedtime  tamsulosin 0.4 milliGRAM(s) Oral at bedtime    MEDICATIONS  (PRN):  acetaminophen     Tablet .. 650 milliGRAM(s) Oral every 6 hours PRN Temp greater or equal to 38C (100.4F)  benzonatate 100 milliGRAM(s) Oral three times a day PRN Cough    -------------------------------------------------------------------------------  LABS:                        10.8   9.42  )-----------( 278      ( 24 Jan 2022 06:04 )             33.8     01-24    143  |  111<H>  |  38<H>  ----------------------------<  108<H>  4.8   |  20<L>  |  1.09    Ca    8.3<L>      24 Jan 2022 06:04  Phos  3.6     01-24  Mg     2.3     01-24    TPro  6.0  /  Alb  2.8<L>  /  TBili  1.7<H>  /  DBili  x   /  AST  40  /  ALT  44  /  AlkPhos  88  01-23    LIVER FUNCTIONS - ( 23 Jan 2022 08:05 )  Alb: 2.8 g/dL / Pro: 6.0 g/dL / ALK PHOS: 88 U/L / ALT: 44 U/L / AST: 40 U/L / GGT: x           CAPILLARY BLOOD GLUCOSE    POCT Blood Glucose.: 91 mg/dL (24 Jan 2022 07:30)    Culture - Blood (collected 23 Jan 2022 16:12)  Source: .Blood Blood  Preliminary Report (24 Jan 2022 05:00):    No growth at 12 hours      COVID-19 PCR: Detected (22 Jan 2022 08:05)  COVID-19 PCR: Positive (15 Macario 2022 04:58)      RADIOLOGY & ADDITIONAL TESTS: Reviewed.

## 2022-01-24 NOTE — PROGRESS NOTE ADULT - ATTENDING COMMENTS
AHRF in setting of covid 19, AF, renal failure with ATN, dementia  Continue decadron  Difficult to mobilize with encephalopathy, trying to increase seroquel  HFNC with 50% oxygen  Continue amlodipine  Decision making of high complexity

## 2022-01-24 NOTE — PROGRESS NOTE ADULT - ASSESSMENT
89 y/o M, POOR HISTORIAN, with PMHx of Dementia (A & O x 1-2 self/place), HTN, colon cancer 2012 (s/p left hemicolectomy - chemoport in place), bilateral carotid artery stenosis (s/p stents 2019), osteoarthritis of hands, and hx of gait instability who presented to [facility] ED BIBEMS on 1/15/22 after fall from bed, admitted for syncopal workup (negative) w course c/b acute hypoxic respiratory failure 2/2 covid pna vs HAP now stepped up to covid telemetry. Pt made DNR/DNI by sharon. On HFNC and started on Zosyn for broad spectrum coverage.     NEUROLOGY     #Dementia  Pt AAOx1-2 at baseline, poor historian. No changes in mental status noted since admission. Patient agitated overnight, given 12.5mg PO Seroquel  - c/w seroquel 12.5 qhs for sundowning     PULMONARY    #Acute respiratory failure with hypoxia   2/2 COVID PNA vs HAP. Increasing O2 requirements and work of breathing.  ABG showing respiratory alkalosis and worsening opacifications on CXR. Patient noted to have trouble clearing when he coughs, so suspicion for HAP. Started on Vanc/zosyn on 1/23. Transitioned from NRB to HFNC. Currently on HFNC @ 50% FiO2 saturating 92%.   - c/w decadron 1/18 - 1/27  - Toci given on 1/23   - MRSA swab negative therefore vancomycin discontinued  - BCx negative at this time, Abx discontinued   - incentive spirometer    CARDIOLOGY   # Carotid artery stenosis.    Hx of carotid artery stenosis; s/p R PTA in 2019 at [facility]. Vascular consulted- carotid duplex reviewed w/ Dr. Balbuena and chief resident, no need for acute surgical intervention at this time  -Per Vascular, fall unlikely related to carotid artery disease  -Patient should follow-up with Dr. Balbuena 1 week after discharge. Please call [phone] to schedule an appointment  -continue Plavix 75mg and  Eliquis 5mg BID   -c/w lipitor upon discharge, holding for now in setting of transaminitis.    # Atrial fibrillation.   - Unclear if new afib - not on A/C, not in RVR  - CHADsVASc: 4   - d/c eliquis due to fall risk per cardiology recs     # HTN (hypertension)  Pt with BPs in 160s/80s. Amlodipine 5mg at home.   - Amlodipine increased to 10 mg once daily with holding parameters  - continue to hold home ACEI given recent JUAN LUIS     # Syncope.    As per outpatient notes, patient has hx of feeling off balance - was attributed to combination of neurodegenerative process, encephalomalacia, spinal stenosis.  - CT Head 1/15/22: No acute intracranial hemorrhage or calvarial fracture.   - Neuro consulted - vEEG negative  - TTE w normal systolic function and no valvular abnormalities    Renal    # Acute kidney injury superimposed on CKD.    Patient with hx of CKD as per MD note - CKD stage 2, Cr improving after bolus, PO intake, and holding ACEI. Cr improving, 1.09 on AM labs.   - Continue to trend, avoid nephrotoxic agents  - hold ACEI in setting of recent JUAN LUIS   - holding remdesivir  RESOLVED     Abdominal    #No acute interventions    Infectious Disease    #COVID-19 PNA v HAP   Pt with worsening/increasing O2 requirements. Pt started on Vancomycin + Zosyn on 1/23 for presumed HAP given worsening CXR however patient afebrile with no leukocytosis noted. Pt stepped up from RMF to [facility]. Now on HFNC @ 50% FiO2.   - c/w Decadron (1/17-1/28)  - holding remdesivir in setting of JUAN LUIS  - Pt given Toci on 1/24 -- monitor O2 requirements and wean as tolerated. Improvement noted.   - Repeat blood cultures sent -- f/u results   - MRSA swab sent -- DC vancomycin if negative  - C/w Zosyn    Heme    #Anemia.   Hgb on admission 10.9, currently no signs of active bleeding (no hematochezia, melena, hemoptysis, hematuria). Iron panel consistent w anemia of chronic disease. Folate normal, b12 elevated. Hgb stable.  - trend CBC  - maintain active T&S  - transfuse if Hgb <7    F: as needed  E: replete as needed, keep K>4, Mg >2  D: DASH/TLC  GI Proph: Protonix  Dispo: RMF to [facility]      89 y/o M, POOR HISTORIAN, with PMHx of Dementia (A & O x 1-2 self/place), HTN, colon cancer 2012 (s/p left hemicolectomy - chemoport in place), bilateral carotid artery stenosis (s/p stents 2019), osteoarthritis of hands, and hx of gait instability who presented to [facility] ED BIBEMS on 1/15/22 after fall from bed, admitted for syncopal workup (negative) w course c/b acute hypoxic respiratory failure 2/2 covid pna vs HAP now stepped up to covid telemetry. Pt made DNR/DNI by sharon. On HFNC and started on Zosyn for broad spectrum coverage. Pt afebrile with BCx negative, Abx discontinued.     NEUROLOGY     #Dementia  Pt AAOx1-2 at baseline, poor historian. No changes in mental status noted since admission. Patient agitated overnight, given 12.5mg PO Seroquel  - c/w seroquel 12.5 qhs for sundowning   - mittens removed, re-oriented     PULMONARY    #Acute respiratory failure with hypoxia   2/2 COVID PNA vs HAP. Increasing O2 requirements and work of breathing.  ABG showing respiratory alkalosis and worsening opacifications on CXR. Patient noted to have trouble clearing when he coughs, so suspicion for HAP. Started on Vanc/zosyn on 1/23. Transitioned from NRB to HFNC. Currently on HFNC @ 50% FiO2 saturating 92%.   - c/w decadron 1/18 - 1/27  - Toci given on 1/23   - MRSA swab negative therefore vancomycin discontinued  - BCx negative at this time, Abx discontinued   - incentive spirometer  - Pt OOBTC today however was very uncomfortable -- sitting upright in bed at this time.     CARDIOLOGY   # Carotid artery stenosis.    Hx of carotid artery stenosis; s/p R PTA in 2019 at [facility]. Vascular consulted- carotid duplex reviewed w/ Dr. Balbuena and chief resident, no need for acute surgical intervention at this time  -Per Vascular, fall unlikely related to carotid artery disease  -Patient should follow-up with Dr. Balbuena 1 week after discharge. Please call [phone] to schedule an appointment  -continue Plavix 75mg and  Eliquis 5mg BID   -c/w lipitor upon discharge, holding for now in setting of transaminitis.    # Atrial fibrillation.   - Unclear if new afib - not on A/C, not in RVR  - CHADsVASc: 4   - d/c eliquis due to fall risk per cardiology recs     # HTN (hypertension)  Pt with BPs in 160s/80s. Amlodipine 5mg at home.   - Amlodipine increased to 10 mg once daily with holding parameters  - continue to hold home ACEI given recent JUAN LUIS     # Syncope.    As per outpatient notes, patient has hx of feeling off balance - was attributed to combination of neurodegenerative process, encephalomalacia, spinal stenosis.  - CT Head 1/15/22: No acute intracranial hemorrhage or calvarial fracture.   - Neuro consulted - vEEG negative  - TTE w normal systolic function and no valvular abnormalities    Renal    # Acute kidney injury superimposed on CKD.    Patient with hx of CKD as per MD note - CKD stage 2, Cr improving after bolus, PO intake, and holding ACEI. Cr improving, 1.09 on AM labs.   - Continue to trend, avoid nephrotoxic agents  - hold ACEI in setting of recent JUAN LUIS   - holding remdesivir  RESOLVED     Abdominal    #No acute interventions    Infectious Disease    #COVID-19 PNA v HAP   Pt with worsening/increasing O2 requirements. Pt started on Vancomycin + Zosyn on 1/23 for presumed HAP given worsening CXR however patient afebrile with no leukocytosis noted. Pt stepped up from RMF to [facility]. Now on HFNC @ 50% FiO2.   - c/w Decadron (1/17-1/28)  - holding remdesivir in setting of JUAN LUIS  - Pt given Toci on 1/24 -- monitor O2 requirements and wean as tolerated. Improvement noted.   - Repeat blood cultures NGTD and patient afebrile. MRSA swab negative. All abx discontinued  - f/u ESR/CRP, LDH, ferritin, D-Dimer, Pro-mercedes to determine progression       Heme    #Anemia.   Hgb on admission 10.9, currently no signs of active bleeding (no hematochezia, melena, hemoptysis, hematuria). Iron panel consistent w anemia of chronic disease. Folate normal, b12 elevated. Hgb stable.  - trend CBC  - maintain active T&S  - transfuse if Hgb <7    F: as needed  E: replete as needed, keep K>4, Mg >2  D: DASH/TLC  GI Proph: Protonix  Dispo: RMF to [facility]      89 y/o M, POOR HISTORIAN, with PMHx of Dementia (A & O x 1-2 self/place), HTN, colon cancer 2012 (s/p left hemicolectomy - chemoport in place), bilateral carotid artery stenosis (s/p stents 2019), osteoarthritis of hands, and hx of gait instability who presented to [facility] ED BIBEMS on 1/15/22 after fall from bed, admitted for syncopal workup (negative) w course c/b acute hypoxic respiratory failure 2/2 covid pna vs HAP now stepped up to covid telemetry. Pt made DNR/DNI by sharon. On HFNC and started on Zosyn for broad spectrum coverage. Pt afebrile with BCx negative, Abx discontinued.     NEUROLOGY     #Dementia  Pt AAOx1-2 at baseline, poor historian. No changes in mental status noted since admission. Patient agitated overnight, given 12.5mg PO Seroquel  - c/w seroquel 12.5 qhs for sundowning   - mittens removed, re-oriented     PULMONARY    #Acute respiratory failure with hypoxia   2/2 COVID PNA vs HAP. Increasing O2 requirements and work of breathing.  ABG showing respiratory alkalosis and worsening opacifications on CXR. Patient noted to have trouble clearing when he coughs, so suspicion for HAP. Started on Vanc/zosyn on 1/23. Transitioned from NRB to HFNC. Currently on HFNC @ 50% FiO2 saturating 92%.   - c/w decadron 1/18 - 1/27  - Toci given on 1/23   - MRSA swab negative therefore vancomycin discontinued  - BCx negative at this time, Abx discontinued   - incentive spirometer  - Pt OOBTC today however was very uncomfortable -- sitting upright in bed at this time.     CARDIOLOGY   # Carotid artery stenosis.    Hx of carotid artery stenosis; s/p R PTA in 2019 at [facility]. Vascular consulted- carotid duplex reviewed w/ Dr. Balbuena and chief resident, no need for acute surgical intervention at this time  -Per Vascular, fall unlikely related to carotid artery disease  -Patient should follow-up with Dr. Balbuena 1 week after discharge. Please call [phone] to schedule an appointment  -continue Plavix 75mg   -c/w lipitor upon discharge, holding for now in setting of transaminitis.    # Atrial fibrillation.   - Unclear if new afib - not on A/C, not in RVR  - CHADsVASc: 4   - d/c eliquis due to fall risk per cardiology recs     # HTN (hypertension)  Pt with BPs in 160s/80s. Amlodipine 5mg at home.   - Amlodipine increased to 10 mg once daily with holding parameters  - continue to hold home ACEI given recent JUAN LUIS     # Syncope.    As per outpatient notes, patient has hx of feeling off balance - was attributed to combination of neurodegenerative process, encephalomalacia, spinal stenosis.  - CT Head 1/15/22: No acute intracranial hemorrhage or calvarial fracture.   - Neuro consulted - vEEG negative  - TTE w normal systolic function and no valvular abnormalities    Renal    # Acute kidney injury superimposed on CKD.    Patient with hx of CKD as per MD note - CKD stage 2, Cr improving after bolus, PO intake, and holding ACEI. Cr improving, 1.09 on AM labs.   - Continue to trend, avoid nephrotoxic agents  - hold ACEI in setting of recent JUAN LUIS   - holding remdesivir  RESOLVED     Abdominal    #No acute interventions    Infectious Disease    #COVID-19 PNA v HAP   Pt with worsening/increasing O2 requirements. Pt started on Vancomycin + Zosyn on 1/23 for presumed HAP given worsening CXR however patient afebrile with no leukocytosis noted. Pt stepped up from RMF to [facility]. Now on HFNC @ 50% FiO2.   - c/w Decadron (1/17-1/28)  - holding remdesivir in setting of JUAN LUIS  - Pt given Toci on 1/24 -- monitor O2 requirements and wean as tolerated. Improvement noted.   - Repeat blood cultures NGTD and patient afebrile. MRSA swab negative. All abx discontinued  - f/u ESR/CRP, LDH, ferritin, D-Dimer, Pro-mercedes to determine progression       Heme    #Anemia.   Hgb on admission 10.9, currently no signs of active bleeding (no hematochezia, melena, hemoptysis, hematuria). Iron panel consistent w anemia of chronic disease. Folate normal, b12 elevated. Hgb stable.  - trend CBC  - maintain active T&S  - transfuse if Hgb <7    F: as needed  E: replete as needed, keep K>4, Mg >2  D: DASH/TLC  GI Proph: Protonix  Dispo: RMF to [facility]

## 2022-01-25 NOTE — PROGRESS NOTE ADULT - ASSESSMENT
89 y/o M, POOR HISTORIAN, with PMHx of Dementia (A & O x 1-2 self/place), HTN, colon cancer 2012 (s/p left hemicolectomy - chemoport in place), bilateral carotid artery stenosis (s/p stents 2019), osteoarthritis of hands, and hx of gait instability who presented to [facility] ED BIBEMS on 1/15/22 after fall from bed, admitted for syncopal workup (negative) w course c/b acute hypoxic respiratory failure 2/2 covid pna vs HAP now stepped up to covid telemetry. Pt made DNR/DNI by sharon. On HFNC and started on Zosyn for broad spectrum coverage. Pt afebrile with BCx negative, Abx discontinued. Pt with fluctuating O2 requirements. Started on PO Prednisone 60mg on 1/25.     NEUROLOGY     #Dementia  Pt AAOx1-2 at baseline, poor historian. No changes in mental status noted since admission. Intermittent agitation noted since admission however none overnight since beginning Seroquel.   - c/w seroquel 25mg qhs      PULMONARY    #Acute respiratory failure with hypoxia   2/2 COVID PNA vs HAP. Increasing O2 requirements and work of breathing.  ABG showing respiratory alkalosis and worsening opacifications on CXR. Patient noted to have trouble clearing when he coughs, so suspicion for HAP. Started on Vanc/zosyn on 1/23. Toci given on 1/23. Transitioned from NRB to HFNC. Currently on HFNC @ 50% FiO2 saturating 90-92%.   - switch from decadron to PO prednisone 60mg given fluctuating O2 requirements.   - MRSA swab and BCx negative, therefore abx discontinued   - incentive spirometer    #R/o PE  D-Dimer increased to >2x UL when compared to labs from admission. Pt with noticeable hypoxia 2/2 COVID PNA v sequelae. Currently on HFNC. Pt has been on DVT prophylaxis since admission and was intermittently on eliquis. Eliquis was discontinued given elevated fall risk. No tachycardia noted .  - f/u LE dopplers to r/o DVT  - f/u echo to determine if there is presence of R heart strain  - c/w lovenox prophylaxis dosing  - continue to monitor.    CARDIOLOGY   # Carotid artery stenosis.    Hx of carotid artery stenosis; s/p R PTA in 2019 at [facility]. Vascular consulted- carotid duplex reviewed w/ Dr. Balbuena and chief resident, no need for acute surgical intervention at this time  -Per Vascular, fall unlikely related to carotid artery disease  -Patient should follow-up with Dr. Balbuena 1 week after discharge. Please call [phone] to schedule an appointment  -continue Plavix 75mg   -c/w lipitor upon discharge    # Atrial fibrillation.   - Unclear if new afib - not on A/C, not in RVR  - CHADsVASc: 4   - d/c eliquis due to fall risk per cardiology recs     # HTN (hypertension)  Pt with BPs in 160s/80s. Amlodipine 5mg at home.   - C/w Amlodipine 10 mg once daily with holding parameters  - continue to hold home ACEI given recent JUAN LUIS     # Syncope.    As per outpatient notes, patient has hx of feeling off balance - was attributed to combination of neurodegenerative process, encephalomalacia, spinal stenosis.  - CT Head 1/15/22: No acute intracranial hemorrhage or calvarial fracture.   - Neuro consulted - vEEG negative  - TTE w normal systolic function and no valvular abnormalities  - f/u repeat Echo     Renal    # Acute kidney injury superimposed on CKD.    Patient with hx of CKD as per MD note - CKD stage 2, Cr improving after bolus, PO intake, and holding ACEI. Cr improving, 1.09 on AM labs.   - Continue to trend, avoid nephrotoxic agents  - hold ACEI in setting of recent JUAN LUIS   - holding remdesivir  RESOLVED     Abdominal    #No acute interventions    Infectious Disease    #COVID-19 PNA v Sequelae   Pt with worsening/increasing O2 requirements. Pt started on Vancomycin + Zosyn on 1/23 for presumed HAP given worsening CXR however patient afebrile with no leukocytosis noted. Pt stepped up from RMF to 3Wo Tele on 1/23. Toci administered on 1/23. Now on HFNC @ 50% FiO2 with fluctuating O2 requirements.   - Switched from decadron to PO Prednisone 60mg   - Repeat blood cultures NGTD and patient afebrile. MRSA swab negative. All abx discontinued  - ESR and CRP elevated  - D-dimer elevated when compared to previous labs -- monitor Echo and LE dopplers       Heme    #Anemia.   Hgb on admission 10.9, currently no signs of active bleeding (no hematochezia, melena, hemoptysis, hematuria). Iron panel consistent w anemia of chronic disease. Folate normal, b12 elevated. Hgb stable.  - trend CBC  - maintain active T&S  - transfuse if Hgb <7    F: as needed  E: replete as needed, keep K>4, Mg >2  D: DASH/TLC  GI Proph: Protonix  Dispo:  3Wo Tele

## 2022-01-25 NOTE — PROGRESS NOTE ADULT - SUBJECTIVE AND OBJECTIVE BOX
CRISS GARCIA, 89y, Male  MRN-[id number]  Patient is a 89y old  Male who presents with a chief complaint of Fall (25 Jan 2022 06:59)      OVERNIGHT EVENTS: Pt on standing Seroqel 25mg PO at bedtime.    SUBJECTIVE: Pt seen/evaluated at bedside. Pt not oriented to time.     12 Point ROS Negative unless noted otherwise above.  -------------------------------------------------------------------------------  VITAL SIGNS:  Vital Signs Last 24 Hrs  T(C): 36.1 (25 Jan 2022 04:22), Max: 36.3 (24 Jan 2022 09:15)  T(F): 96.9 (25 Jan 2022 04:22), Max: 97.4 (24 Jan 2022 20:00)  HR: 62 (25 Jan 2022 05:30) (46 - 120)  BP: 156/67 (25 Jan 2022 04:00) (109/64 - 156/67)  BP(mean): 97 (25 Jan 2022 04:00) (70 - 117)  RR: 22 (25 Jan 2022 05:30) (22 - 58)  SpO2: 91% (25 Jan 2022 05:30) (85% - 96%)  I&O's Summary    24 Jan 2022 07:01  -  25 Jan 2022 07:00  --------------------------------------------------------  IN: 50 mL / OUT: 1900 mL / NET: -1850 mL        PHYSICAL EXAM:    General: NAD, well developed  HEENT: NC/AT; EOMI, PERRLA, anicteric sclera; moist mucosal membranes.  Neck: supple, trachea midline  Cardiovascular: RRR, +S1/S2; NO M/R/G  Respiratory: CTA B/L; no W/R/R  Gastrointestinal: soft, NT/ND; +BSx4  Genitourinary: condom cath in place   Extremities: WWP; no edema or cyanosis  Vascular: 2+ radial, DP/PT pulses B/L  Neurological: AAOx3; no focal deficits    ALLERGIES:  Allergies    No Known Allergies    Intolerances        MEDICATIONS:  MEDICATIONS  (STANDING):  amLODIPine   Tablet 10 milliGRAM(s) Oral every 24 hours  clopidogrel Tablet 75 milliGRAM(s) Oral daily  dexAMETHasone  Injectable 6 milliGRAM(s) IV Push every 24 hours  enoxaparin Injectable 40 milliGRAM(s) SubCutaneous at bedtime  insulin lispro (ADMELOG) corrective regimen sliding scale   SubCutaneous Before meals and at bedtime  lactated ringers. 1000 milliLiter(s) (75 mL/Hr) IV Continuous <Continuous>  pantoprazole    Tablet 40 milliGRAM(s) Oral before breakfast  polyethylene glycol 3350 17 Gram(s) Oral every 12 hours  QUEtiapine 25 milliGRAM(s) Oral at bedtime  senna 2 Tablet(s) Oral at bedtime  tamsulosin 0.4 milliGRAM(s) Oral at bedtime    MEDICATIONS  (PRN):  acetaminophen     Tablet .. 650 milliGRAM(s) Oral every 6 hours PRN Temp greater or equal to 38C (100.4F)  benzonatate 100 milliGRAM(s) Oral three times a day PRN Cough      -------------------------------------------------------------------------------  LABS:                        10.3   10.09 )-----------( 258      ( 25 Jan 2022 05:42 )             33.1     01-25    143  |  113<H>  |  38<H>  ----------------------------<  116<H>  4.9   |  22  |  1.14    Ca    8.0<L>      25 Jan 2022 05:42  Phos  4.2     01-25  Mg     2.2     01-25    TPro  6.0  /  Alb  2.8<L>  /  TBili  1.7<H>  /  DBili  x   /  AST  40  /  ALT  44  /  AlkPhos  88  01-23    LIVER FUNCTIONS - ( 23 Jan 2022 08:05 )  Alb: 2.8 g/dL / Pro: 6.0 g/dL / ALK PHOS: 88 U/L / ALT: 44 U/L / AST: 40 U/L / GGT: x               CAPILLARY BLOOD GLUCOSE      POCT Blood Glucose.: 148 mg/dL (24 Jan 2022 21:53)      Culture - Blood (collected 23 Jan 2022 16:12)  Source: .Blood Blood  Preliminary Report (24 Jan 2022 17:00):    No growth at 1 day.      COVID-19 PCR: Detected (22 Jan 2022 08:05)  COVID-19 PCR: Positive (15 Macario 2022 04:58)      RADIOLOGY & ADDITIONAL TESTS: Reviewed.   CRISS GARCIA, 89y, Male  MRN-[id number]  Patient is a 89y old  Male who presents with a chief complaint of Fall (25 Jan 2022 06:59)      OVERNIGHT EVENTS: Pt on standing Seroqel 25mg PO at bedtime.    SUBJECTIVE: Pt seen/evaluated at bedside. Pt not oriented to time. Patient on HFNC, laying in bed and resting.     12 Point ROS Negative unless noted otherwise above.  -------------------------------------------------------------------------------  VITAL SIGNS:  Vital Signs Last 24 Hrs  T(C): 36.1 (25 Jan 2022 04:22), Max: 36.3 (24 Jan 2022 09:15)  T(F): 96.9 (25 Jan 2022 04:22), Max: 97.4 (24 Jan 2022 20:00)  HR: 62 (25 Jan 2022 05:30) (46 - 120)  BP: 156/67 (25 Jan 2022 04:00) (109/64 - 156/67)  BP(mean): 97 (25 Jan 2022 04:00) (70 - 117)  RR: 22 (25 Jan 2022 05:30) (22 - 58)  SpO2: 91% (25 Jan 2022 05:30) (85% - 96%)  I&O's Summary    24 Jan 2022 07:01  -  25 Jan 2022 07:00  --------------------------------------------------------  IN: 50 mL / OUT: 1900 mL / NET: -1850 mL        PHYSICAL EXAM:    General: laying in bed, thin/cachectic; on HFNC   HEENT: NC/AT; anicteric sclera; dry mucosal membranes.  Cardiovascular: RRR, +S1/S2; NO M/R/G  Respiratory: CTA B/L; no W/R/R  Gastrointestinal: soft, NT/ND; +BSx4  Genitourinary: condom cath in place   Extremities: WWP; no edema or cyanosis; healing abrasion noted on L anterior knee   Vascular: 2+ radial, DP/PT pulses B/L  Neurological: AAOx1-2 at baseline     ALLERGIES:  Allergies    No Known Allergies    Intolerances    MEDICATIONS:  MEDICATIONS  (STANDING):  amLODIPine   Tablet 10 milliGRAM(s) Oral every 24 hours  clopidogrel Tablet 75 milliGRAM(s) Oral daily  dexAMETHasone  Injectable 6 milliGRAM(s) IV Push every 24 hours  enoxaparin Injectable 40 milliGRAM(s) SubCutaneous at bedtime  insulin lispro (ADMELOG) corrective regimen sliding scale   SubCutaneous Before meals and at bedtime  lactated ringers. 1000 milliLiter(s) (75 mL/Hr) IV Continuous <Continuous>  pantoprazole    Tablet 40 milliGRAM(s) Oral before breakfast  polyethylene glycol 3350 17 Gram(s) Oral every 12 hours  QUEtiapine 25 milliGRAM(s) Oral at bedtime  senna 2 Tablet(s) Oral at bedtime  tamsulosin 0.4 milliGRAM(s) Oral at bedtime    MEDICATIONS  (PRN):  acetaminophen     Tablet .. 650 milliGRAM(s) Oral every 6 hours PRN Temp greater or equal to 38C (100.4F)  benzonatate 100 milliGRAM(s) Oral three times a day PRN Cough  -------------------------------------------------------------------------------  LABS:                        10.3   10.09 )-----------( 258      ( 25 Jan 2022 05:42 )             33.1     01-25    143  |  113<H>  |  38<H>  ----------------------------<  116<H>  4.9   |  22  |  1.14    Ca    8.0<L>      25 Jan 2022 05:42  Phos  4.2     01-25  Mg     2.2     01-25    TPro  6.0  /  Alb  2.8<L>  /  TBili  1.7<H>  /  DBili  x   /  AST  40  /  ALT  44  /  AlkPhos  88  01-23    LIVER FUNCTIONS - ( 23 Jan 2022 08:05 )  Alb: 2.8 g/dL / Pro: 6.0 g/dL / ALK PHOS: 88 U/L / ALT: 44 U/L / AST: 40 U/L / GGT: x           CAPILLARY BLOOD GLUCOSE    POCT Blood Glucose.: 148 mg/dL (24 Jan 2022 21:53)    Culture - Blood (collected 23 Jan 2022 16:12)  Source: .Blood Blood  Preliminary Report (24 Jan 2022 17:00):    No growth at 1 day.    COVID-19 PCR: Detected (22 Jan 2022 08:05)  COVID-19 PCR: Positive (15 Macario 2022 04:58)    RADIOLOGY & ADDITIONAL TESTS: Reviewed.

## 2022-01-25 NOTE — PROGRESS NOTE ADULT - ATTENDING COMMENTS
Worsening AHRF despite rx for covid pna, possible organizing pna, dementia, agitation, AF anemia  Continue empiric higher dose prednisone  zyprexa/seroquel  HR controlled  Amlodipine for HTN  Monitor swallowing  DNR/DNI but on HFNC 50-60%, 50 lpm    Decision making of high complexity

## 2022-01-26 PROBLEM — N40.0 BENIGN PROSTATIC HYPERPLASIA WITHOUT LOWER URINARY TRACT SYMPTOMS: Chronic | Status: ACTIVE | Noted: 2022-01-01

## 2022-01-26 PROBLEM — I10 ESSENTIAL (PRIMARY) HYPERTENSION: Chronic | Status: ACTIVE | Noted: 2022-01-01

## 2022-01-26 PROBLEM — C18.9 MALIGNANT NEOPLASM OF COLON, UNSPECIFIED: Chronic | Status: ACTIVE | Noted: 2022-01-01

## 2022-01-26 PROBLEM — E78.00 PURE HYPERCHOLESTEROLEMIA, UNSPECIFIED: Chronic | Status: ACTIVE | Noted: 2022-01-01

## 2022-01-26 NOTE — PROGRESS NOTE ADULT - ATTENDING COMMENTS
Continued AHRF with possible OP on top of covid 19 pna with agitated delirium at times  Continue zyprexa  Empiric high dose prednisone  Amlodipine for HTN  Continue IVF at 75/hr as po intake marginal  Swallowing under supervision  Decision making of high complexity

## 2022-01-26 NOTE — CHART NOTE - NSCHARTNOTEFT_GEN_A_CORE
Progress Note:   · Provider Specialty	Palliative Care    Reason for Admission:    Reason for Admission:  · Reason for Admission	Fall      · Subjective and Objective:   [facility]iatrics and Palliative Care  Contact Info: Call [phone] (HEAL Line) or message on Microsoft Teams    HPI:  Patient is a 89 y/o M, POOR HISTORIAN, with PMHx of Dementia (A & O x 1-2 self/place), HTN, colon cancer 2012 (s/p left hemicolectomy - chemoport in place), bilateral carotid artery stenosis (s/p stents 2019), osteoarthritis of hands, hx of imbalance , who presented to [facility] ED [facility] on 1/15/22 after fall from bed. Patient was found lying down supine in living room - wife said he tripped and fell, so she called 911.  Patient states that he does not remember what happened and does not state he fell. Patient denies CP, SOB, palpitations, dizziness, fatigue, headache, n/v/d, abdominal pain, blurry vision, loss of sensation, numbness or tingling.     Upon admit, VS - T 98.3F, HR 67, /76, RR 17, SpO2 97% on RA. Labs significant for ALT 27, AST, blood glucose 113, BUN/Cr 28/1.33, PTT 1.7, INR 1.5, H/H 10.9/34.4, platelet 107, covid +, troponin 0.15.  CT  head prelim read 1/15: no acute ICH or calvarial fracture, posterior-superior R scalp bruising, asymmetric atrophy of L anterior maxillary horn, chronic small vessel ischemic disease. CT cervical spine prelim 1/15: No vertebral body fracture or dislocation. ECG 1/15/22: slow Afib @ 52 VR w/ RBBB, peaked T waves.  Pt admitted to [facility] tele for tele for further management of syncope.  (15 Macario 2022 11:42)    PERTINENT PM/SXH:   HTN (hypertension)    High cholesterol    BPH (benign prostatic hyperplasia)    Colon cancer      H/O hemicolectomy    Presence of internal carotid stent      FAMILY HISTORY:    ITEMS NOT CHECKED ARE NOT PRESENT    SOCIAL HISTORY:   Significant other/partner:  []  Children:  []   Substance hx:  []   Tobacco hx:  [x]   Alcohol hx: []   Home Opioid hx:  [] I-Stop Reference No:  - no active Rx's / see chart note  Living Situation: [x]Home  []Long term care  []Rehab []Other  Cheondoism/Spiritual practice: ; Role of organized Sabianism [ ] important [ ] some [ ] unable to assess  Coping: [ ] well [ ] with difficulty [ ] poor coping [x ] unable to assess  Support system: [ ] strong [x ] adequate [ ] inadequate    ADVANCE DIRECTIVES:    [x]MOLST  []Living Will  DECISION MAKER(s):  [] Health Care Proxy(s)  [x] Surrogate(s)  [] Guardian           Name(s)/Phone Number(s): Cesilia Frazier (spouse)    BASELINE (I)ADLs (prior to admission):  Charles: []Total  [] Moderate [x]Dependent    ALLERGIES:  No Known Allergies    MEDICATIONS  (STANDING):  amLODIPine   Tablet 10 milliGRAM(s) Oral every 24 hours  clopidogrel Tablet 75 milliGRAM(s) Oral daily  enoxaparin Injectable 40 milliGRAM(s) SubCutaneous at bedtime  insulin lispro (ADMELOG) corrective regimen sliding scale   SubCutaneous Before meals and at bedtime  lactated ringers. 1000 milliLiter(s) (75 mL/Hr) IV Continuous <Continuous>  OLANZapine 2.5 milliGRAM(s) Oral daily  pantoprazole    Tablet 40 milliGRAM(s) Oral before breakfast  polyethylene glycol 3350 17 Gram(s) Oral every 12 hours  predniSONE   Tablet 60 milliGRAM(s) Oral daily  QUEtiapine 25 milliGRAM(s) Oral at bedtime  senna 2 Tablet(s) Oral at bedtime  tamsulosin 0.4 milliGRAM(s) Oral at bedtime    MEDICATIONS  (PRN):  acetaminophen     Tablet .. 650 milliGRAM(s) Oral every 6 hours PRN Temp greater or equal to 38C (100.4F)  benzonatate 100 milliGRAM(s) Oral three times a day PRN Cough    PRESENT SYMPTOMS: [x]Unable to obtain due to poor mentation/encephalopathy  Source if other than patient:  []Family   []Team     Pain: [ ] yes [x ] no  QOL impact -   Location -                    Aggravating Factors -  Quality -  Radiation -  Timing -  Severity (0-10 scale) -   Minimal Acceptable Level (0-10 scale) -    PAIN AD Score:  [email or web address] (press ctrl +  left click to view)    Dyspnea:                           []Mild  []Moderate []Severe  Anxiety:                             []Mild []Moderate []Severe  Fatigue:                             []Mild []Moderate []Severe  Nausea:                             []Mild []Moderate []Severe  Loss of Appetite:              []Mild []Moderate []Severe  Constipation:                    []Mild []Moderate []Severe    Other Symptoms:  [x]All other review of systems negative     Palliative Performance Status Version 2: 30 %    [email or web address]    PHYSICAL EXAM:  GENERAL:  []Alert  []Oriented x   [x]Lethargic  [x]Cachexia  []Unarousable  []Verbal  []Non-Verbal  Behavioral:   []Anxiety  []Delirium []Agitation [x]Cooperative  HEENT:  []Normal   [x]Dry mouth   []ET Tube/Trach  []Oral lesions  PULMONARY:   []Clear [x]Tachypnea  []Audible excessive secretions   [x]Rhonchi        []Right []Left [x]Bilateral  []Crackles        []Right []Left []Bilateral  []Wheezing     []Right []Left []Bilateral  CARDIOVASCULAR:    [x]Regular []Irregular []Tachy  []Harsh []Murmur []Other  GASTROINTESTINAL:  [x]Soft  []Distended   [x]+BS  [x]Non tender []Tender  []PEG []OGT/ NGT  Last BM:     GENITOURINARY:  []Normal [] Incontinent   []Oliguria/Anuria   [xFoley  MUSCULOSKELETAL:   []Normal   x[]Weakness  [x]Bed/Wheelchair bound []Edema  NEUROLOGIC:   []No focal deficits  [x]Cognitive impairment  []Dysphagia []Dysarthria []Paresis []Encephalopathic   SKIN:   [x]Normal   []Pressure ulcer(s)  []Rash    CRITICAL CARE:  [ ]Shock Present  [ ]Septic [ ]Cardiogenic [ ]Neurologic [ ]Hypovolemic  [ ]Vasopressors [ ]Inotropes   [x ]Respiratory failure present [ ]Mechanical Ventilation [ ]Non-invasive ventilatory support [ ]High-Flow  [x ]Acute  [ ]Chronic [x ]Hypoxic  [ ]Hypercarbic  [ ]Other organ failure    Vital Signs Last 24 Hrs  T(C): 36.1 (26 Jan 2022 04:27), Max: 36.1 (26 Jan 2022 00:56)  T(F): 97 (26 Jan 2022 04:27), Max: 97 (26 Jan 2022 00:56)  HR: 46 (26 Jan 2022 09:50) (46 - 84)  BP: 140/62 (26 Jan 2022 08:18) (135/65 - 166/72)  BP(mean): 89 (26 Jan 2022 08:18) (86 - 100)  RR: 32 (26 Jan 2022 09:50) (23 - 52)  SpO2: 92% (26 Jan 2022 09:50) (85% - 99%) I&O's Summary    25 Jan 2022 07:01  -  26 Jan 2022 07:00  --------------------------------------------------------  IN: 1230 mL / OUT: 700 mL / NET: 530 mL    26 Jan 2022 07:01  -  26 Jan 2022 10:23  --------------------------------------------------------  IN: 240 mL / OUT: 200 mL / NET: 40 mL        LABS:                        9.8    11.81 )-----------( 236      ( 26 Jan 2022 08:22 )             30.5   01-26    143  |  115<H>  |  39<H>  ----------------------------<  115<H>  4.7   |  20<L>  |  0.96    Ca    8.0<L>      26 Jan 2022 08:22  Phos  3.8     01-26  Mg     2.0     01-26        RADIOLOGY & ADDITIONAL STUDIES:      PROTEIN CALORIE MALNUTRITION PRESENT: [ ]mild [ ]moderate [ ]severe [ ]underweight [ ]morbid obesity  []PPSV2 < or = to 30% []significant weight loss  []poor nutritional intake []catabolic state []anasarca     Artificial Nutrition []     REFERRALS:  [x]Social Work  []Case management []PT/OT []Chaplaincy  []Hospice  []Patient/Family Support    DISCUSSION OF CASE: Family - to obtain additional history and to provide emotional support; ( ) -     Care Coordination/Goals of Care Document:                                          Progress Notes    PROGRESS NOTE  Date & Time of Note   2022-01-25 12:30    Notes    Notes: Chart reviewed. Case discussed during IDR. As per the MICU team, patient  remains medically acute in respiratory failure with plans to adjust steroid  regimen. CM remains available.       Electronically signed by:  Radha Burks  Electronically signed on:  2022-01-25  14:19           PALLIATIVE MEDICINE COORDINATION OF CARE DOCUMENTATION: [x] Inpatient Consult  Non-Face-to-Face prolonged service provided that relates to (face-to-face) care that has or will occur and ongoing patient management, including one or more of the following: - Reviewed documentation from other physicians and other health care professional services - Reviewed medical records and diagnostic / radiology study results - Coordination with patient's support system  ************************************************************************  MEDICATION REVIEW:  - See Medication List Above    ISTOP REFERENCE:   - no active Rx's / see ISTOP Chart Note  - PRN usage: NO PRN'S  ------------------------------------------------------------------------  COORDINATION OF CARE:  - Palliative Care consulted for: GOC / Symptom Management  - Patient (to be) assessed: 1/21/22  - Patient previously seen by Palliative Care service: NO    ADVANCE CARE PLANNING  - Code status: FULL  - MOLST reviewed in chart: NONE; None found on Alpha  - HCP/Surrogate: Suki (Niece) [phone]  - [facility] documents: NONE found on [place]  - HCP/Living will/Other Advanced Directives in Alpha: NONE found on Alpha  ------------------------------------------------------------------------  CARE PROVIDER DOCUMENTATION:  - SW/CM notes: Remains medically active  -   -   -     PLAN OF CARE  - Known admissions in past year: 1  - Current admit date:  1/21/22  - LOS: 6  - LACE score: 13  - Current dispo plan: TO BE DETERMINED    01-15-22 (11d)  ------------------------------------------------------------------------  - Time Spent/Chart reviewed: 41 Minutes [including time used to gather, review and transfer data]  - Start: 1100  - End: 1141    Prolonged services rendered, as part of this patient's care provided by Palliative Medicine, include: i. chart review for provider and ancillary service documentation, ii. pertinent diagnostics including laboratory and imaging studies, iii. medication review including PRN use, iv. admission history including previous palliative care encounters and GOC notes, v. advance care planning documents including HCP and MOLST forms in Alpha. Part of Palliative Medicine extended evaluation and management also involves coordination of care with our IDT, the primary and consulting teams, and unit CM/SW and Hospice if eligible. Recommendations based on the information gathered and discussed are outlined in the A/P of Palliative notes.      Assessment and Plan:   · Assessment	  89 M with acute respiratory failure due to COVID, Alzheimer's dementia, debility, encounter for palliative care.      Problem/Plan - 1:  ·  Problem: Acute respiratory failure with hypoxia.   ·  Plan: - Patient with severe respiratory failure in the setting of COVID PNA.  - Currently receiving steroids, Remdesivir and condominant antibiotics for suspected bacterial PNA.  - Requiring HiFlow for treatment of hypoxia.   - Xray with evidence of bilateral infiltrates.   - DNR/DNI as per family wishes, as per discussion by primary team.  - If respiratory status worsens, and is not responsive to HiFLow, low dose opiates such as Morphine 1mg IV Q2 hours prn is appropriate.     Problem/Plan - 2:  ·  Problem: 2019 novel coronavirus disease (COVID-19).   ·  Plan: - Currently receiving steroids, Remdesivir and condominant antibiotics for suspected bacterial PNA.  - Requiring HiFlow for treatment of hypoxia.   - Xray with evidence of bilateral infiltrates.     Problem/Plan - 3:  ·  Problem: Alzheimer's dementia, late onset.   ·  Plan: - Evidence of moderate to severe dementia at baseline. Mental status waxes and wanes.  - Currently, difficult to assess proper baseline.  - Supportive care.  - Frequent reorientation.  - Zyprexa available.     Problem/Plan - 4:  ·  Problem: Debility.   ·  Plan: - PPSV2 is 30%.  - Requires full assist with all ADLs.     Problem/Plan - 5:  ·  Problem: Encounter for palliative care.   ·  Plan: - Patient with underlying dementia, other chronic comorbidities, presents with fall, found to have respiratory failure in the setting of COVID PNA.  - Palliative care following for symptoms and GOC.  - Patient is DNR.  - Will readdress goals depending on clinical course.

## 2022-01-26 NOTE — PROGRESS NOTE ADULT - SUBJECTIVE AND OBJECTIVE BOX
** INCOMPLETE **     INTERVAL HPI/OVERNIGHT EVENTS:    SUBJECTIVE: Patient seen and examined at bedside.    VITAL SIGNS:  ICU Vital Signs Last 24 Hrs  T(C): 36.1 (26 Jan 2022 04:27), Max: 36.1 (26 Jan 2022 00:56)  T(F): 97 (26 Jan 2022 04:27), Max: 97 (26 Jan 2022 00:56)  HR: 65 (26 Jan 2022 01:00) (62 - 76)  BP: 135/65 (26 Jan 2022 01:00) (135/65 - 166/72)  BP(mean): 93 (26 Jan 2022 01:00) (86 - 95)  ABP: --  ABP(mean): --  RR: 39 (26 Jan 2022 01:00) (22 - 52)  SpO2: 99% (26 Jan 2022 01:00) (85% - 99%)        01-24 @ 07:01 - 01-25 @ 07:00  --------------------------------------------------------  IN: 50 mL / OUT: 1900 mL / NET: -1850 mL    01-25 @ 07:01 - 01-26 @ 05:55  --------------------------------------------------------  IN: 1230 mL / OUT: 600 mL / NET: 630 mL      CAPILLARY BLOOD GLUCOSE      POCT Blood Glucose.: 177 mg/dL (25 Jan 2022 21:38)      PHYSICAL EXAM:    Constitutional: NAD  HEENT: NC/AT; PERRL, anicteric sclera; MMM  Neck: supple, no JVD  Cardiovascular: +S1/S2, RRR  Respiratory: CTA B/L, no W/R/R  Gastrointestinal: abdomen soft, NT/ND; no rebound or guarding; +BSx4  Genitourinary: no suprapubic tenderness or fullness  Extremities: WWP; no LE edema; no clubbing or cyanosis  Vascular: 2+ radial, DP/PT and femoral pulses B/L  Dermatologic: normal color and turgor; no visible rashes  Neurological:     MEDICATIONS:  MEDICATIONS  (STANDING):  amLODIPine   Tablet 10 milliGRAM(s) Oral every 24 hours  clopidogrel Tablet 75 milliGRAM(s) Oral daily  enoxaparin Injectable 40 milliGRAM(s) SubCutaneous at bedtime  insulin lispro (ADMELOG) corrective regimen sliding scale   SubCutaneous Before meals and at bedtime  lactated ringers. 1000 milliLiter(s) (75 mL/Hr) IV Continuous <Continuous>  OLANZapine 2.5 milliGRAM(s) Oral daily  pantoprazole    Tablet 40 milliGRAM(s) Oral before breakfast  polyethylene glycol 3350 17 Gram(s) Oral every 12 hours  predniSONE   Tablet 60 milliGRAM(s) Oral daily  QUEtiapine 25 milliGRAM(s) Oral at bedtime  senna 2 Tablet(s) Oral at bedtime  tamsulosin 0.4 milliGRAM(s) Oral at bedtime    MEDICATIONS  (PRN):  acetaminophen     Tablet .. 650 milliGRAM(s) Oral every 6 hours PRN Temp greater or equal to 38C (100.4F)  benzonatate 100 milliGRAM(s) Oral three times a day PRN Cough      ALLERGIES:  Allergies    No Known Allergies    Intolerances        LABS:                        10.3   10.09 )-----------( 258      ( 25 Jan 2022 05:42 )             33.1     01-25    143  |  113<H>  |  38<H>  ----------------------------<  116<H>  4.9   |  22  |  1.14    Ca    8.0<L>      25 Jan 2022 05:42  Phos  4.2     01-25  Mg     2.2     01-25            RADIOLOGY & ADDITIONAL TESTS: Reviewed. INTERVAL HPI/OVERNIGHT EVENTS:  Agitated despite seroquel, pulling lines/HF and trying to get out of bed; ordered mittens b/l briefly, dc'ed once pt fell asleep ; weaned FiO2 down to 60%    SUBJECTIVE:   Patient seen and examined at bedside, underlying dementia. Agitated in the morning, orientated to self only. Patient on HFNC    VITAL SIGNS:  ICU Vital Signs Last 24 Hrs  T(C): 36.1 (26 Jan 2022 04:27), Max: 36.1 (26 Jan 2022 00:56)  T(F): 97 (26 Jan 2022 04:27), Max: 97 (26 Jan 2022 00:56)  HR: 65 (26 Jan 2022 01:00) (62 - 76)  BP: 135/65 (26 Jan 2022 01:00) (135/65 - 166/72)  BP(mean): 93 (26 Jan 2022 01:00) (86 - 95)  ABP: --  ABP(mean): --  RR: 39 (26 Jan 2022 01:00) (22 - 52)  SpO2: 99% (26 Jan 2022 01:00) (85% - 99%)        01-24 @ 07:01 - 01-25 @ 07:00  --------------------------------------------------------  IN: 50 mL / OUT: 1900 mL / NET: -1850 mL    01-25 @ 07:01 - 01-26 @ 05:55  --------------------------------------------------------  IN: 1230 mL / OUT: 600 mL / NET: 630 mL      CAPILLARY BLOOD GLUCOSE      POCT Blood Glucose.: 177 mg/dL (25 Jan 2022 21:38)      PHYSICAL EXAM:  General: laying in bed, agitated, thin, cachectic male; on HFNC 50%   HEENT: NC/AT; anicteric sclera; dry mucosal membranes  Cardiovascular: RRR, +S1/S2; NO M/R/G  Respiratory: +diffuse crackles, on HFNC, no accessory muscle use or increased WOB  Gastrointestinal: thin abd, soft, NT/ND; +BSx4  Genitourinary: +condom cath in place, about 200cc or yellowish colored urine over shift  Extremities: WWP; no edema or cyanosis; healing abrasion noted on L anterior knee   Vascular: 2+ radial, DP/PT pulses B/L  Neurological: AAOx1-2 at baseline     MEDICATIONS:  MEDICATIONS  (STANDING):  amLODIPine   Tablet 10 milliGRAM(s) Oral every 24 hours  clopidogrel Tablet 75 milliGRAM(s) Oral daily  enoxaparin Injectable 40 milliGRAM(s) SubCutaneous at bedtime  insulin lispro (ADMELOG) corrective regimen sliding scale   SubCutaneous Before meals and at bedtime  lactated ringers. 1000 milliLiter(s) (75 mL/Hr) IV Continuous <Continuous>  OLANZapine 2.5 milliGRAM(s) Oral daily  pantoprazole    Tablet 40 milliGRAM(s) Oral before breakfast  polyethylene glycol 3350 17 Gram(s) Oral every 12 hours  predniSONE   Tablet 60 milliGRAM(s) Oral daily  QUEtiapine 25 milliGRAM(s) Oral at bedtime  senna 2 Tablet(s) Oral at bedtime  tamsulosin 0.4 milliGRAM(s) Oral at bedtime    MEDICATIONS  (PRN):  acetaminophen     Tablet .. 650 milliGRAM(s) Oral every 6 hours PRN Temp greater or equal to 38C (100.4F)  benzonatate 100 milliGRAM(s) Oral three times a day PRN Cough      ALLERGIES:  Allergies    No Known Allergies    Intolerances      LABS:                        10.3   10.09 )-----------( 258      ( 25 Jan 2022 05:42 )             33.1     01-25    143  |  113<H>  |  38<H>  ----------------------------<  116<H>  4.9   |  22  |  1.14    Ca    8.0<L>      25 Jan 2022 05:42  Phos  4.2     01-25  Mg     2.2     01-25            RADIOLOGY & ADDITIONAL TESTS: Reviewed. HOSPITAL COURSE:  89 y/o M, poor historian with PMHx of Dementia (A & O x 1-2 self/place), HTN, colon cancer 2012 (s/p left hemicolectomy - chemoport in place), bilateral carotid artery stenosis (s/p stents 2019), osteoarthritis of hands, hx of imbalance , who presented to [facility] ED BIB[facility] on 1/15/22 after fall from bed vs. syncope, admitted to cardiac tele for syncopal workup, found to be COVID+. Regarding syncope work-up, patient underwent CT Head 1/15/22: No acute intracranial hemorrhage or calvarial fracture, vEEG showed no epileptiform activity. CT Neck/Cervical 1/15/22:  demonstrates a right carotid stent in place with narrowing of its lumen. Vascular consulted- carotid duplex reviewed w/ Dr. Balbuena and chief resident, no need for acute surgical intervention at this time, and fall unlikely related to carotid artery disease. Echo showed normal systolic function w no major valvular abnormalities. Telemetry without any significant arrhythmia, however Patient was found to be in rate-controlled Afib (unclear if new, but pt not on AC). He was started on eliquis 5mg BID then discontinued due to fall risk. Regarding COVID, patient was intermittently spiking fevers but not hypoxic. On 1/18AM, pt noted to desat to high 80s on RA, requiring 2-3L NC. Patient was started on decadron and remdesevir and accepted to COVID medicine under Dr. Redding. Remdesivir d/c'd in setting of JUAN LUIS. Today (1/23) patient was hypoxic on 4L NC, still hypoxic to mid 80s on 6L, then saturated 95% on NRB. ICU consulted in setting of increased O2 requirements and increased work of breathing. ABG showed respiratory alkalosis. CXR showed worsening b/l opacifications, and he was started on vanc/zosyn for coverage of HAP. Patient stepped up to 3wollman telemetry for closer vital signs monitoring and use of HFNC i/s/o worsening covid PNA vs HAP. Pt CXR showing worsening bilateral infiltrates with no consolidations or focal emphasis. BCx negative and patient remained afebrile therefore ABX discontinued. Pt continued on HFNC. Intermittently agitated, placed on mittens with Seroquel 25mg PO qhs and zyprexa PO 2.5mg during the day. Pt at baseline mentation of AAO1-2. Toci given on 1/23. JUAN LUIS resolved, continuing with decadron. Wean O2 on HFNC as tolerated.       INTERVAL HPI/OVERNIGHT EVENTS:  Agitated despite seroquel, pulling lines/HF and trying to get out of bed; ordered mittens b/l briefly, dc'ed once pt fell asleep ; weaned FiO2 down to 60%    SUBJECTIVE:   Patient seen and examined at bedside, underlying dementia. Agitated in the morning, orientated to self only. Patient on HFNC    VITAL SIGNS:  ICU Vital Signs Last 24 Hrs  T(C): 36.1 (26 Jan 2022 04:27), Max: 36.1 (26 Jan 2022 00:56)  T(F): 97 (26 Jan 2022 04:27), Max: 97 (26 Jan 2022 00:56)  HR: 65 (26 Jan 2022 01:00) (62 - 76)  BP: 135/65 (26 Jan 2022 01:00) (135/65 - 166/72)  BP(mean): 93 (26 Jan 2022 01:00) (86 - 95)  ABP: --  ABP(mean): --  RR: 39 (26 Jan 2022 01:00) (22 - 52)  SpO2: 99% (26 Jan 2022 01:00) (85% - 99%)        01-24 @ 07:01 - 01-25 @ 07:00  --------------------------------------------------------  IN: 50 mL / OUT: 1900 mL / NET: -1850 mL    01-25 @ 07:01 - 01-26 @ 05:55  --------------------------------------------------------  IN: 1230 mL / OUT: 600 mL / NET: 630 mL      CAPILLARY BLOOD GLUCOSE      POCT Blood Glucose.: 177 mg/dL (25 Jan 2022 21:38)      PHYSICAL EXAM:  General: laying in bed, agitated, thin, cachectic male; on HFNC 50%   HEENT: NC/AT; anicteric sclera; dry mucosal membranes  Cardiovascular: RRR, +S1/S2; NO M/R/G  Respiratory: +diffuse crackles, on HFNC, no accessory muscle use or increased WOB  Gastrointestinal: thin abd, soft, NT/ND; +BSx4  Genitourinary: +condom cath in place, about 200cc or yellowish colored urine over shift  Extremities: WWP; no edema or cyanosis; healing abrasion noted on L anterior knee   Vascular: 2+ radial, DP/PT pulses B/L  Neurological: AAOx1-2 at baseline     MEDICATIONS:  MEDICATIONS  (STANDING):  amLODIPine   Tablet 10 milliGRAM(s) Oral every 24 hours  clopidogrel Tablet 75 milliGRAM(s) Oral daily  enoxaparin Injectable 40 milliGRAM(s) SubCutaneous at bedtime  insulin lispro (ADMELOG) corrective regimen sliding scale   SubCutaneous Before meals and at bedtime  lactated ringers. 1000 milliLiter(s) (75 mL/Hr) IV Continuous <Continuous>  OLANZapine 2.5 milliGRAM(s) Oral daily  pantoprazole    Tablet 40 milliGRAM(s) Oral before breakfast  polyethylene glycol 3350 17 Gram(s) Oral every 12 hours  predniSONE   Tablet 60 milliGRAM(s) Oral daily  QUEtiapine 25 milliGRAM(s) Oral at bedtime  senna 2 Tablet(s) Oral at bedtime  tamsulosin 0.4 milliGRAM(s) Oral at bedtime    MEDICATIONS  (PRN):  acetaminophen     Tablet .. 650 milliGRAM(s) Oral every 6 hours PRN Temp greater or equal to 38C (100.4F)  benzonatate 100 milliGRAM(s) Oral three times a day PRN Cough      ALLERGIES:  Allergies    No Known Allergies    Intolerances      LABS:                        10.3   10.09 )-----------( 258      ( 25 Jan 2022 05:42 )             33.1     01-25    143  |  113<H>  |  38<H>  ----------------------------<  116<H>  4.9   |  22  |  1.14    Ca    8.0<L>      25 Jan 2022 05:42  Phos  4.2     01-25  Mg     2.2     01-25            RADIOLOGY & ADDITIONAL TESTS: Reviewed.

## 2022-01-27 NOTE — SWALLOW BEDSIDE ASSESSMENT ADULT - SWALLOW EVAL: DIAGNOSIS
Pt is at high aspiration risk given poor clinical presentation, poor secretion management, respiratory insufficiency on HFNC and tachypneic, and AMS. PO diet is premature at this time; recs for NPO w/ short-term alternative means of nutrition/hydration, if within pt's GOC. Will f/u to re-assess as respiratory status improves/stabilizes.

## 2022-01-27 NOTE — PROGRESS NOTE ADULT - SUBJECTIVE AND OBJECTIVE BOX
CC: Patient is a 89y old  Male who presents with a chief complaint of Fall (26 Jan 2022 05:55)      INTERVAL EVENTS: Suctioned oral secretions, b/l wrist restraints placed, increased HFNC requirements to 60/70%    SUBJECTIVE / INTERVAL HPI: Patient seen and examined at bedside. Pt disoriented and trying to get out of bed.     ROS: negative unless otherwise stated above.    VITAL SIGNS:  Vital Signs Last 24 Hrs  T(C): 37.1 (27 Jan 2022 09:13), Max: 37.1 (27 Jan 2022 09:13)  T(F): 98.7 (27 Jan 2022 09:13), Max: 98.7 (27 Jan 2022 09:13)  HR: 74 (27 Jan 2022 08:57) (53 - 92)  BP: 143/72 (27 Jan 2022 08:57) (131/60 - 160/70)  BP(mean): 100 (27 Jan 2022 08:57) (86 - 100)  RR: 24 (27 Jan 2022 08:57) (20 - 29)  SpO2: 92% (27 Jan 2022 08:57) (85% - 100%)      01-26-22 @ 07:01  -  01-27-22 @ 07:00  --------------------------------------------------------  IN: 1380 mL / OUT: 1050 mL / NET: 330 mL    01-27-22 @ 07:01 - 01-27-22 @ 10:16  --------------------------------------------------------  IN: 0 mL / OUT: 0 mL / NET: 0 mL        PHYSICAL EXAM:    General: NAD  HEENT: MMM  Neck: supple  Cardiovascular: +S1/S2; RRR  Respiratory: CTA B/L; no W/R/R  Gastrointestinal: soft, NT/ND  Extremities: WWP; no edema, clubbing or cyanosis  Vascular: 2+ radial, DP/PT pulses B/L  Neurological: AAOx0, disoriented, trying to get out of bed, no focal deficits    MEDICATIONS:  MEDICATIONS  (STANDING):  amLODIPine   Tablet 10 milliGRAM(s) Oral every 24 hours  clopidogrel Tablet 75 milliGRAM(s) Oral daily  enoxaparin Injectable 40 milliGRAM(s) SubCutaneous at bedtime  insulin lispro (ADMELOG) corrective regimen sliding scale   SubCutaneous Before meals and at bedtime  lactated ringers. 1000 milliLiter(s) (75 mL/Hr) IV Continuous <Continuous>  OLANZapine 2.5 milliGRAM(s) Oral daily  polyethylene glycol 3350 17 Gram(s) Oral every 12 hours  predniSONE   Tablet 60 milliGRAM(s) Oral daily  QUEtiapine 25 milliGRAM(s) Oral at bedtime  senna 2 Tablet(s) Oral at bedtime  tamsulosin 0.4 milliGRAM(s) Oral at bedtime    MEDICATIONS  (PRN):  acetaminophen     Tablet .. 650 milliGRAM(s) Oral every 6 hours PRN Temp greater or equal to 38C (100.4F)  benzonatate 100 milliGRAM(s) Oral three times a day PRN Cough      ALLERGIES:  Allergies    No Known Allergies    Intolerances        LABS:                        10.8   11.64 )-----------( 216      ( 27 Jan 2022 06:56 )             34.5     01-27    143  |  113<H>  |  38<H>  ----------------------------<  100<H>  4.7   |  22  |  1.14    Ca    8.6      27 Jan 2022 06:56  Phos  3.5     01-27  Mg     1.9     01-27          CAPILLARY BLOOD GLUCOSE      POCT Blood Glucose.: 87 mg/dL (27 Jan 2022 06:47)      RADIOLOGY & ADDITIONAL TESTS: Reviewed.

## 2022-01-27 NOTE — SWALLOW BEDSIDE ASSESSMENT ADULT - SLP PERTINENT HISTORY OF CURRENT PROBLEM
89 y/o M, POOR HISTORIAN, with PMHx of Dementia (A & O x 1-2 self/place), HTN, colon cancer 2012 (s/p left hemicolectomy - chemoport in place), bilateral carotid artery stenosis (s/p stents 2019), osteoarthritis of hands, and hx of gait instability who presented to [facility] ED BIBEMS on 1/15/22 after fall from bed, admitted for syncopal workup (negative) w course c/b acute hypoxic respiratory failure 2/2 covid pna vs HAP now stepped up to covid telemetry. Pt made DNR/DNI by sharon. On HFNC and started on Zosyn for broad spectrum coverage. Pt afebrile with BCx negative, Abx discontinued. Pt with fluctuating O2 requirements.

## 2022-01-27 NOTE — SWALLOW BEDSIDE ASSESSMENT ADULT - SLP GENERAL OBSERVATIONS
Pt received awake, confused, restless, +b/l wrist restraints. Verbalizations were largely unintelligible.

## 2022-01-27 NOTE — SWALLOW BEDSIDE ASSESSMENT ADULT - PHARYNGEAL PHASE
Increased WOB following PO trials, requiring frequent rest breaks. Vocal quality wet at rest, cleared with PO trials. No overt signs of aspiration observed.

## 2022-01-27 NOTE — CHART NOTE - NSCHARTNOTEFT_GEN_A_CORE
Progress Note:   · Provider Specialty	Palliative Care    Reason for Admission:    Reason for Admission:  · Reason for Admission	Fall      · Subjective and Objective:   [facility] and Palliative Care  Contact Info: Call [phone] (HEAL Line) or message on [employer] Teams    HPI:  Patient is a 89 y/o M, POOR HISTORIAN, with PMHx of Dementia (A & O x 1-2 self/place), HTN, colon cancer  (s/p left hemicolectomy - chemoport in place), bilateral carotid artery stenosis (s/p stents ), osteoarthritis of hands, hx of imbalance , who presented to [facility] ED BiEMS on 1/15/22 after fall from bed. Patient was found lying down supine in living room - wife said he tripped and fell, so she called 911.  Patient states that he does not remember what happened and does not state he fell. Patient denies CP, SOB, palpitations, dizziness, fatigue, headache, n/v/d, abdominal pain, blurry vision, loss of sensation, numbness or tingling.     Upon admit, VS - T 98.3F, HR 67, /76, RR 17, SpO2 97% on RA. Labs significant for ALT 27, AST, blood glucose 113, BUN/Cr 28/1.33, PTT 1.7, INR 1.5, H/H 10.9/34.4, platelet 107, covid +, troponin 0.15.  CT  head prelim read 1/15: no acute ICH or calvarial fracture, posterior-superior R scalp bruising, asymmetric atrophy of L anterior maxillary horn, chronic small vessel ischemic disease. CT cervical spine prelim 1/15: No vertebral body fracture or dislocation. ECG 1/15/22: slow Afib @ 52 VR w/ RBBB, peaked T waves.  Pt admitted to [facility] tele for tele for further management of syncope.  (15 Macario 2022 11:42)    PERTINENT PM/SXH:   HTN (hypertension)    High cholesterol    BPH (benign prostatic hyperplasia)    Colon cancer      H/O hemicolectomy    Presence of internal carotid stent      FAMILY HISTORY:    ITEMS NOT CHECKED ARE NOT PRESENT    SOCIAL HISTORY:   Significant other/partner:  []  Children:  []   Substance hx:  []   Tobacco hx:  [x]   Alcohol hx: []   Home Opioid hx:  [] I-Stop Reference No:  - no active Rx's / see chart note  Living Situation: [x]Home  []Long term care  []Rehab []Other  Episcopalian/Spiritual practice: ; Role of organized Mosque [ ] important [ ] some [ ] unable to assess  Coping: [ ] well [ ] with difficulty [ ] poor coping [x ] unable to assess  Support system: [ ] strong [x ] adequate [ ] inadequate    ADVANCE DIRECTIVES:    [x]MOLST  []Living Will  DECISION MAKER(s):  [] Health Care Proxy(s)  [x] Surrogate(s)  [] Guardian           Name(s)/Phone Number(s): Cesilia Frazier (spouse)    BASELINE (I)ADLs (prior to admission):  Moody: []Total  [] Moderate [x]Dependent    ALLERGIES:  No Known Allergies    MEDICATIONS  (STANDING):  amLODIPine   Tablet 10 milliGRAM(s) Oral every 24 hours  clopidogrel Tablet 75 milliGRAM(s) Oral daily  enoxaparin Injectable 40 milliGRAM(s) SubCutaneous at bedtime  insulin lispro (ADMELOG) corrective regimen sliding scale   SubCutaneous Before meals and at bedtime  lactated ringers. 1000 milliLiter(s) (75 mL/Hr) IV Continuous <Continuous>  OLANZapine 2.5 milliGRAM(s) Oral daily  pantoprazole    Tablet 40 milliGRAM(s) Oral before breakfast  polyethylene glycol 3350 17 Gram(s) Oral every 12 hours  predniSONE   Tablet 60 milliGRAM(s) Oral daily  QUEtiapine 25 milliGRAM(s) Oral at bedtime  senna 2 Tablet(s) Oral at bedtime  tamsulosin 0.4 milliGRAM(s) Oral at bedtime    MEDICATIONS  (PRN):  acetaminophen     Tablet .. 650 milliGRAM(s) Oral every 6 hours PRN Temp greater or equal to 38C (100.4F)  benzonatate 100 milliGRAM(s) Oral three times a day PRN Cough    PRESENT SYMPTOMS: [x]Unable to obtain due to poor mentation/encephalopathy  Source if other than patient:  []Family   []Team     Pain: [ ] yes [x ] no  QOL impact -   Location -                    Aggravating Factors -  Quality -  Radiation -  Timing -  Severity (0-10 scale) -   Minimal Acceptable Level (0-10 scale) -    PAIN AD Score:  [email or web address] (press ctrl +  left click to view)    Dyspnea:                           []Mild  []Moderate []Severe  Anxiety:                             []Mild []Moderate []Severe  Fatigue:                             []Mild []Moderate []Severe  Nausea:                             []Mild []Moderate []Severe  Loss of Appetite:              []Mild []Moderate []Severe  Constipation:                    []Mild []Moderate []Severe    Other Symptoms:  [x]All other review of systems negative     Palliative Performance Status Version 2: 30 %    [email or web address]    PHYSICAL EXAM:  GENERAL:  []Alert  []Oriented x   [x]Lethargic  [x]Cachexia  []Unarousable  []Verbal  []Non-Verbal  Behavioral:   []Anxiety  []Delirium []Agitation [x]Cooperative  HEENT:  []Normal   [x]Dry mouth   []ET Tube/Trach  []Oral lesions  PULMONARY:   []Clear [x]Tachypnea  []Audible excessive secretions   [x]Rhonchi        []Right []Left [x]Bilateral  []Crackles        []Right []Left []Bilateral  []Wheezing     []Right []Left []Bilateral  CARDIOVASCULAR:    [x]Regular []Irregular []Tachy  []Harsh []Murmur []Other  GASTROINTESTINAL:  [x]Soft  []Distended   [x]+BS  [x]Non tender []Tender  []PEG []OGT/ NGT  Last BM:     GENITOURINARY:  []Normal [] Incontinent   []Oliguria/Anuria   [xFoley  MUSCULOSKELETAL:   []Normal   x[]Weakness  [x]Bed/Wheelchair bound []Edema  NEUROLOGIC:   []No focal deficits  [x]Cognitive impairment  []Dysphagia []Dysarthria []Paresis []Encephalopathic   SKIN:   [x]Normal   []Pressure ulcer(s)  []Rash    CRITICAL CARE:  [ ]Shock Present  [ ]Septic [ ]Cardiogenic [ ]Neurologic [ ]Hypovolemic  [ ]Vasopressors [ ]Inotropes   [x ]Respiratory failure present [ ]Mechanical Ventilation [ ]Non-invasive ventilatory support [ ]High-Flow  [x ]Acute  [ ]Chronic [x ]Hypoxic  [ ]Hypercarbic  [ ]Other organ failure    Vital Signs Last 24 Hrs  T(C): 36.1 (2022 04:27), Max: 36.1 (2022 00:56)  T(F): 97 (2022 04:27), Max: 97 (2022 00:56)  HR: 46 (2022 09:50) (46 - 84)  BP: 140/62 (2022 08:18) (135/65 - 166/72)  BP(mean): 89 (2022 08:18) (86 - 100)  RR: 32 (2022 09:50) (23 - 52)  SpO2: 92% (2022 09:50) (85% - 99%) I&O's Summary    2022 07:01  -  2022 07:00  --------------------------------------------------------  IN: 1230 mL / OUT: 700 mL / NET: 530 mL    2022 07:01  -  2022 10:23  --------------------------------------------------------  IN: 240 mL / OUT: 200 mL / NET: 40 mL        LABS:                        9.8    11.81 )-----------( 236      ( 2022 08:22 )             30.5   01-26    143  |  115<H>  |  39<H>  ----------------------------<  115<H>  4.7   |  20<L>  |  0.96    Ca    8.0<L>      2022 08:22  Phos  3.8       Mg     2.0             RADIOLOGY & ADDITIONAL STUDIES:      PROTEIN CALORIE MALNUTRITION PRESENT: [ ]mild [ ]moderate [ ]severe [ ]underweight [ ]morbid obesity  []PPSV2 < or = to 30% []significant weight loss  []poor nutritional intake []catabolic state []anasarca     Artificial Nutrition []     REFERRALS:  [x]Social Work  []Case management []PT/OT []Chaplaincy  []Hospice  []Patient/Family Support    DISCUSSION OF CASE: Family - to obtain additional history and to provide emotional support; ( ) -     Care Coordination/Goals of Care Document:                                          Progress Notes    PROGRESS NOTE  Date & Time of Note   2022 12:30    Notes    Notes: Chart reviewed. Case discussed during IDR. As per the MICU team, patient  remains medically acute in respiratory failure with plans to adjust steroid  regimen. CM remains available.       Electronically signed by:  Radha Burks  Electronically signed on:  2022  14:19           PALLIATIVE MEDICINE COORDINATION OF CARE DOCUMENTATION: [x] Inpatient Consult  Non-Face-to-Face prolonged service provided that relates to (face-to-face) care that has or will occur and ongoing patient management, including one or more of the following: - Reviewed documentation from other physicians and other health care professional services - Reviewed medical records and diagnostic / radiology study results - Coordination with patient's support system  ************************************************************************  MEDICATION REVIEW:  - See Medication List Above    ISTOP REFERENCE:   - no active Rx's / see ISTOP Chart Note  - PRN usage: NO PRN'S  ------------------------------------------------------------------------  COORDINATION OF CARE:  - Palliative Care consulted for: GOC / Symptom Management  - Patient (to be) assessed: 22  - Patient previously seen by Palliative Care service: NO    ADVANCE CARE PLANNING  - Code status: FULL  - MOLST reviewed in chart: NONE; None found on Alpha  - HCP/Surrogate: Suki (Niece) [phone]  - [facility] documents: NONE found on [place]  - HCP/Living will/Other Advanced Directives in Alpha: NONE found on Alpha  ------------------------------------------------------------------------  CARE PROVIDER DOCUMENTATION:  - SW/CM notes: Remains medically active  -   -   -     PLAN OF CARE  - Known admissions in past year: 1  - Current admit date:  22  - LOS: 6  - LACE score: 13  - Current dispo plan: TO BE DETERMINED    01-15-22 (11d)  ------------------------------------------------------------------------  - Time Spent/Chart reviewed: 41 Minutes [including time used to gather, review and transfer data]  - Start: 1100  - End: 1141    Prolonged services rendered, as part of this patient's care provided by Palliative Medicine, include: i. chart review for provider and ancillary service documentation, ii. pertinent diagnostics including laboratory and imaging studies, iii. medication review including PRN use, iv. admission history including previous palliative care encounters and GOC notes, v. advance care planning documents including HCP and MOLST forms in Alpha. Part of Palliative Medicine extended evaluation and management also involves coordination of care with our IDT, the primary and consulting teams, and unit CM/SW and Hospice if eligible. Recommendations based on the information gathered and discussed are outlined in the A/P of Palliative notes.      Assessment and Plan:   · Assessment	  89 M with acute respiratory failure due to COVID, Alzheimer's dementia, debility, encounter for palliative care.      Problem/Plan - 1:  ·  Problem: Acute respiratory failure with hypoxia.   ·  Plan: - Patient with severe respiratory failure in the setting of COVID PNA.  - Currently receiving steroids, Remdesivir and condominant antibiotics for suspected bacterial PNA.  - Requiring HiFlow for treatment of hypoxia.   - Xray with evidence of bilateral infiltrates.   - DNR/DNI as per family wishes, as per discussion by primary team.  - Morphine 1mg IV Q4 hours prn.      Problem/Plan - 2:  ·  Problem: 2019 novel coronavirus disease (COVID-19).   ·  Plan: - Currently receiving steroids, Remdesivir and condominant antibiotics for suspected bacterial PNA.  - Requiring HiFlow for treatment of hypoxia.   - Xray with evidence of bilateral infiltrates.     Problem/Plan - 3:  ·  Problem: Alzheimer's dementia, late onset.   ·  Plan: - Evidence of moderate to severe dementia at baseline. Mental status waxes and wanes.  - Currently, difficult to assess proper baseline.  - Supportive care.  - Frequent reorientation.  - Zyprexa available.     Problem/Plan - 4:  ·  Problem: Debility.   ·  Plan: - PPSV2 is 30%.  - Requires full assist with all ADLs.     Problem/Plan - 5:  ·  Problem: Encounter for palliative care.   ·  Plan: - Patient with underlying dementia, other chronic comorbidities, presents with fall, found to have respiratory failure in the setting of COVID PNA.  - Palliative care following for symptoms and GOC.  - Patient is DNR.    Problem 6:  [facility] minutes spent with patients two daughters as well as niece Suki (HCP). Patient unable to participate due to poor mental status. Conversation had over the phone due to restrictions on visitation due to COVID pandemic.   Given progressive deterioration, goals shifted towards comfort. Limit blood draws, imaging and vital signs. Pleasure feeds, with understanding of aspiration risks. Patient is too unstable for transfer to hospice.

## 2022-01-27 NOTE — SWALLOW BEDSIDE ASSESSMENT ADULT - ADDITIONAL RECOMMENDATIONS
Can allow ice chips/small sips of water via tsp in moderation as tolerated, with strict aspiration precautions, following thorough oral care.

## 2022-01-27 NOTE — SWALLOW BEDSIDE ASSESSMENT ADULT - COMMENTS
Pt receiving supplemental O2 via HFNC, 60/70%, RR 30-40s, O2 low-mid 90s. Poor secretion management per RN, requiring intermittent oral suction.

## 2022-01-27 NOTE — PROGRESS NOTE ADULT - ASSESSMENT
89 y/o M, POOR HISTORIAN, with PMHx of Dementia (A & O x 1-2 self/place), HTN, colon cancer 2012 (s/p left hemicolectomy - chemoport in place), bilateral carotid artery stenosis (s/p stents 2019), osteoarthritis of hands, and hx of gait instability who presented to [facility] ED BIBEMS on 1/15/22 after fall from bed, admitted for syncopal workup (negative) w course c/b acute hypoxic respiratory failure 2/2 covid pna vs HAP now stepped up to covid telemetry. Pt made DNR/DNI by sharon. On HFNC and started on Zosyn for broad spectrum coverage. Pt afebrile with BCx negative, Abx discontinued. Pt with fluctuating O2 requirements. Started on PO Prednisone 60mg on 1/25.     NEUROLOGY   #Dementia  Pt AAOx1-2 at baseline, poor historian. More disoriented at night and this morning. Intermittent agitation noted since admission however none overnight since beginning Seroquel. Given extended steroid use for COVID PNA, steroids can be contributing to patient's agitation.   - c/w seroquel 25mg qhs  - c/w Zyprexa 2.5mg in AM, hold for oversedation  - s/s evaluation due to patient not taking diet and increased secretions  - f/u palliative recs    PULMONARY  #Acute respiratory failure with hypoxia   2/2 COVID PNA vs HAP. Increasing O2 requirements and work of breathing.  ABG showing respiratory alkalosis and worsening opacifications on CXR. Patient noted to have trouble clearing when he coughs, so suspicion for HAP. Started on Vanc/zosyn on 1/23. Toci given on 1/23. Transitioned from NRB to HFNC. Currently on HFNC @ 50% FiO2 saturating 90-92%.   - switch from decadron to PO prednisone 60mg given fluctuating O2 requirements.   - MRSA swab and BCx negative, therefore abx discontinued   - incentive spirometer    #R/o PE  D-Dimer increased to >2x UL when compared to labs from admission. Pt with noticeable hypoxia 2/2 COVID PNA v sequelae. Currently on HFNC. Pt has been on DVT prophylaxis since admission and was intermittently on eliquis. Eliquis was discontinued given elevated fall risk. No tachycardia noted .  - f/u LE dopplers neg  - TTE (1/16): EF 65-70%, normal LV systolic function, size, and wall motion. No evidence of AR. Trace MR.  - c/w lovenox prophylaxis dosing    CARDIOLOGY   # Carotid artery stenosis.    Hx of carotid artery stenosis; s/p R PTA in 2019 at [facility]. Vascular consulted- carotid duplex reviewed w/ Dr. Balbuena and chief resident, no need for acute surgical intervention at this time  -Per Vascular, fall unlikely related to carotid artery disease  -Patient should follow-up with Dr. Balbuena 1 week after discharge. Please call [phone] to schedule an appointment  -continue Plavix 75mg   -c/w lipitor upon discharge    # Atrial fibrillation.   - Unclear if new afib - not on A/C, not in RVR  - CHADsVASc: 4   - d/c eliquis due to fall risk per cardiology recs     # HTN (hypertension)  Pt with BPs in 160s/80s. Amlodipine 5mg at home.   - C/w Amlodipine 10 mg once daily with holding parameters  - continue to hold home ACEI given recent JUAN LUIS     # Syncope.    As per outpatient notes, patient has hx of feeling off balance - was attributed to combination of neurodegenerative process, encephalomalacia, spinal stenosis.  - CT Head 1/15/22: No acute intracranial hemorrhage or calvarial fracture.   - Neuro consulted - vEEG negative  - TTE w normal systolic function and no valvular abnormalities    Renal  # Acute kidney injury superimposed on CKD.    Patient with hx of CKD as per MD note - CKD stage 2, Cr improving after bolus, PO intake, and holding ACEI. Cr improving, 1.09 on AM labs.   - Continue to trend, avoid nephrotoxic agents  - hold ACEI in setting of recent JUAN LUIS   - holding remdesivir  RESOLVED     Abdominal  #No acute interventions    Infectious Disease  #COVID-19 PNA v Sequelae   Pt with worsening/increasing O2 requirements. Pt started on Vancomycin + Zosyn on 1/23 for presumed HAP given worsening CXR however patient afebrile with no leukocytosis noted. Pt stepped up from RMF to 3Wo Tele on 1/23. Toci administered on 1/23. Now on HFNC @ 50% FiO2 with fluctuating O2 requirements.   - Switched from decadron to PO Prednisone 60mg   - Repeat blood cultures NGTD and patient afebrile. MRSA swab negative. All abx discontinued  - ESR and CRP elevated  - D-dimer elevated when compared to previous labs -- monitor Echo and LE dopplers     Heme  #Anemia.   Hgb on admission 10.9, currently no signs of active bleeding (no hematochezia, melena, hemoptysis, hematuria). Iron panel consistent w anemia of chronic disease. Folate normal, b12 elevated. Hgb stable.  - trend CBC  - maintain active T&S  - transfuse if Hgb <7    F: as needed  E: replete as needed, keep K>4, Mg >2  D: DASH/TLC  GI Proph: Protonix  Dispo:  3Wo Tele      89 y/o M, POOR HISTORIAN, with PMHx of Dementia (A & O x 1-2 self/place), HTN, colon cancer 2012 (s/p left hemicolectomy - chemoport in place), bilateral carotid artery stenosis (s/p stents 2019), osteoarthritis of hands, and hx of gait instability who presented to [facility] ED BIBEMS on 1/15/22 after fall from bed, admitted for syncopal workup (negative) w course c/b acute hypoxic respiratory failure 2/2 covid pna vs HAP now stepped up to covid telemetry. Pt made DNR/DNI by sharon. On HFNC and started on Zosyn for broad spectrum coverage. Pt afebrile with BCx negative, Abx discontinued. Pt with fluctuating O2 requirements. Started on PO Prednisone 60mg on 1/25.     NEUROLOGY   #Dementia  Pt AAOx1-2 at baseline, poor historian. More disoriented at night and this morning. Intermittent agitation noted since admission however none overnight since beginning Seroquel. Given extended steroid use for COVID PNA, steroids can be contributing to patient's agitation.   - c/w seroquel 25mg qhs  - c/w Zyprexa 2.5mg in AM, hold for oversedation  - s/s evaluation due to patient not taking diet and increased secretions  - f/u palliative recs    PULMONARY  #Acute respiratory failure with hypoxia   2/2 COVID PNA vs HAP. Increasing O2 requirements and work of breathing.  ABG showing respiratory alkalosis and worsening opacifications on CXR. Patient noted to have trouble clearing when he coughs, so suspicion for HAP. Started on Vanc/zosyn on 1/23. Toci given on 1/23. Transitioned from NRB to HFNC. Currently on HFNC @ 50% FiO2 saturating 90-92%.   - switch from decadron to PO prednisone 60mg given fluctuating O2 requirements.   - MRSA swab and BCx negative, therefore abx discontinued   - incentive spirometer    #R/o PE  D-Dimer increased to >2x UL when compared to labs from admission. Pt with noticeable hypoxia 2/2 COVID PNA v sequelae. Currently on HFNC. Pt has been on DVT prophylaxis since admission and was intermittently on eliquis. Eliquis was discontinued given elevated fall risk. No tachycardia noted .  - f/u LE dopplers neg  - TTE (1/16): EF 65-70%, normal LV systolic function, size, and wall motion. No evidence of AR. Trace MR.  - c/w lovenox prophylaxis dosing    CARDIOLOGY   # Carotid artery stenosis.    Hx of carotid artery stenosis; s/p R PTA in 2019 at [facility]. Vascular consulted- carotid duplex reviewed w/ Dr. Balbuena and chief resident, no need for acute surgical intervention at this time  -Per Vascular, fall unlikely related to carotid artery disease  -Patient should follow-up with Dr. Balbuena 1 week after discharge. Please call [phone] to schedule an appointment  -continue Plavix 75mg   -c/w lipitor upon discharge    # Atrial fibrillation.   - Unclear if new afib - not on A/C, not in RVR  - CHADsVASc: 4   - d/c eliquis due to fall risk per cardiology recs     # HTN (hypertension)  Pt with BPs in 160s/80s. Amlodipine 5mg at home.   - C/w Amlodipine 10 mg once daily with holding parameters  - continue to hold home ACEI given recent JUAN LUIS     # Syncope.    As per outpatient notes, patient has hx of feeling off balance - was attributed to combination of neurodegenerative process, encephalomalacia, spinal stenosis.  - CT Head 1/15/22: No acute intracranial hemorrhage or calvarial fracture.   - Neuro consulted - vEEG negative  - TTE w normal systolic function and no valvular abnormalities    Renal  # Acute kidney injury superimposed on CKD.    Patient with hx of CKD as per MD note - CKD stage 2  - Continue to trend, avoid nephrotoxic agents  - hold ACEI in setting of recent JUAN LUIS   - holding remdesivir  RESOLVED   - c/w maintenance fluids  - mild uptrend today likely due to decreased PO intake    Abdominal  #No acute interventions    Infectious Disease  #COVID-19 PNA v Sequelae   Pt with worsening/increasing O2 requirements. Pt started on Vancomycin + Zosyn on 1/23 for presumed HAP given worsening CXR however patient afebrile with no leukocytosis noted. Pt stepped up from RMF to 3Wo Tele on 1/23. Toci administered on 1/23. Now on HFNC @ 50% FiO2 with fluctuating O2 requirements.   - Switched from decadron to PO Prednisone 60mg   - Repeat blood cultures NGTD and patient afebrile. MRSA swab negative. All abx discontinued  - ESR and CRP elevated  - D-dimer elevated when compared to previous labs    Heme  #Anemia.   Hgb on admission 10.9, currently no signs of active bleeding (no hematochezia, melena, hemoptysis, hematuria). Iron panel consistent w anemia of chronic disease. Folate normal, b12 elevated. Hgb stable.  - trend CBC  - maintain active T&S  - transfuse if Hgb <7    F: as needed  E: replete as needed, keep K>4, Mg >2  D: DASH/TLC  GI Proph: Protonix  Dispo:  3Wo Tele

## 2022-01-27 NOTE — CHART NOTE - NSCHARTNOTEFT_GEN_A_CORE
Admitting Diagnosis:   Patient is a 89y old  Male who presents with a chief complaint of Fall (27 Jan 2022 10:16)      PAST MEDICAL & SURGICAL HISTORY:  HTN (hypertension)    High cholesterol    BPH (benign prostatic hyperplasia)    Colon cancer    H/O hemicolectomy    Presence of internal carotid stent        Current Nutrition Order:   Diet, NPO:   Except Medications (01-26-22 @ 18:22)      PO Intake: Good (%) [   ]  Fair (50-75%) [   ] Poor (<25%) [ x  ]    GI Issues: WDL; Last BM: 1/26/22; No N/V/D/C as per flow sheet     Pain: No pain/discomfort as per flowsheet.     Skin Integrity:  Fausto: 16   L abrasion   No PI noted.     Labs:   01-27    143  |  113<H>  |  38<H>  ----------------------------<  100<H>  4.7   |  22  |  1.14    Ca    8.6      27 Jan 2022 06:56  Phos  3.5     01-27  Mg     1.9     01-27      CAPILLARY BLOOD GLUCOSE      POCT Blood Glucose.: 100 mg/dL (27 Jan 2022 11:33)  POCT Blood Glucose.: 87 mg/dL (27 Jan 2022 06:47)  POCT Blood Glucose.: 139 mg/dL (26 Jan 2022 21:47)  POCT Blood Glucose.: 106 mg/dL (26 Jan 2022 17:34)  POCT Blood Glucose.: 109 mg/dL (26 Jan 2022 12:08)      Medications:  MEDICATIONS  (STANDING):  amLODIPine   Tablet 10 milliGRAM(s) Oral every 24 hours  clopidogrel Tablet 75 milliGRAM(s) Oral daily  enoxaparin Injectable 40 milliGRAM(s) SubCutaneous at bedtime  insulin lispro (ADMELOG) corrective regimen sliding scale   SubCutaneous Before meals and at bedtime  lactated ringers. 1000 milliLiter(s) (75 mL/Hr) IV Continuous <Continuous>  OLANZapine 2.5 milliGRAM(s) Oral daily  polyethylene glycol 3350 17 Gram(s) Oral every 12 hours  predniSONE   Tablet 60 milliGRAM(s) Oral daily  QUEtiapine 25 milliGRAM(s) Oral at bedtime  senna 2 Tablet(s) Oral at bedtime  tamsulosin 0.4 milliGRAM(s) Oral at bedtime    MEDICATIONS  (PRN):  acetaminophen     Tablet .. 650 milliGRAM(s) Oral every 6 hours PRN Temp greater or equal to 38C (100.4F)  benzonatate 100 milliGRAM(s) Oral three times a day PRN Cough      Anthropometrics upon admission:   Ht: 71" Wt: 140lb BMI: 19.5 IBW: 155lb %IBW: 90%   1/17: 145.2lbs  1/18: 139.2lbs  1/27: 124.7lbs     Weight Change: Pt with 15.3lbs/10.9% weight loss x 1 week. However wt does not reflect previous weights and will request for a reweigh.     Nutrition Focused Physical Exam: Completed [ x  ]  Not Pertinent [   ]  Muscle Wasting- Temporal [  x ]  Clavicle/Pectoral [ x ]  Moderate   Fat Wasting-  Buccal [  x ] Moderate   Suspect Moderate PCM] 2/2 to physical assessment, poor intake and possible wt loss    Estimated energy needs:   1760-2100kcal (25-30kcal/kg)   70-84g protein (1.0-1.2g protein/kg)   Fluids per team.     IBW 70kg used to calculated estimated needs due to possible inaccuracy of CBW. Estimated needs based on [facility] standards of care. Adjusted for age, malnutrition COVID19+.     Subjective:     89 y/o M, POOR HISTORIAN, with PMHx of Dementia (A & O x 1-2 self/place), HTN, colon cancer 2012 (s/p left hemicolectomy - chemo port in place), bilateral carotid artery stenosis (s/p stents 2019), osteoarthritis of hands, and hx of gait instability who presented to [facility] ED BIBEMS on 1/15/22 after fall from bed, admitted for syncopal workup (negative) w course c/b acute hypoxic respiratory failure 2/2 Covid-19 PNA. Pt required High flow for treatment of hypoxia (1/21/22). 1/23/22-pt hypoxic on 4L and complained of trouble breathing and cough. ICU consulted in setting of increased O2 requirements and increased breathing. CXR showed worsening B/L opacifications and started on vanc/zosyn for coverage of HAP-Pt was stepped up to 3 woll for closer vital signs. Palliative consult was in place and was made DNR/DNI by niece (1/24/22). Pt with fluctuating O2 requirements. Started on PO Prednisone 60mg on 1/25.     Pt seen at bedside sleeping. Limited information r/t A&Ox1. As per nurse, pt is NPO due to noticing lots of oral secretion while eating and speech eval is in place for further rec. Upon NFPE, pt presents with moderate muscle wasting in temporal region and clavicles and moderate subcutaneous fat loss in buccals. As per ASPEN guidelines, pt meets criteria for moderate protein calorie malnutrition. Once medically feasible, rec liberalization of diet to regular with the appropriate consistency from speech.     Previous Nutrition Diagnosis: Increased nutrient needs r/t increased demand for kcal/protein AEB age and COVID19+ demands.     Active [  X ]  Resolved [   ]    Goal: pt to consume % of meals to reduce s/s of malnutrition.     Recommendations:  1. Once medically feasible, change diet from DASH/TLC to regular to encourage PO intake   2. Follow up with SLP recommendations   3. Monitor PO intake/hydration status   4. Monitor Bowel regimen   5. Follow and honor GOC.     Education: N/A     Risk Level: High [   ] Moderate [   ] Low [ x ] Admitting Diagnosis:   Patient is a 89y old  Male who presents with a chief complaint of Fall (27 Jan 2022 10:16)      PAST MEDICAL & SURGICAL HISTORY:  HTN (hypertension)    High cholesterol    BPH (benign prostatic hyperplasia)    Colon cancer    H/O hemicolectomy    Presence of internal carotid stent        Current Nutrition Order:   Diet, NPO:   Except Medications (01-26-22 @ 18:22)      PO Intake: Good (%) [   ]  Fair (50-75%) [   ] Poor (<25%) [ x  ]    GI Issues: WDL; Last BM: 1/26/22; No N/V/D/C as per flow sheet     Pain: No pain/discomfort as per flowsheet.     Skin Integrity:  Fausto: 16   L abrasion   No PI noted.     Labs:   01-27    143  |  113<H>  |  38<H>  ----------------------------<  100<H>  4.7   |  22  |  1.14    Ca    8.6      27 Jan 2022 06:56  Phos  3.5     01-27  Mg     1.9     01-27      CAPILLARY BLOOD GLUCOSE      POCT Blood Glucose.: 100 mg/dL (27 Jan 2022 11:33)  POCT Blood Glucose.: 87 mg/dL (27 Jan 2022 06:47)  POCT Blood Glucose.: 139 mg/dL (26 Jan 2022 21:47)  POCT Blood Glucose.: 106 mg/dL (26 Jan 2022 17:34)  POCT Blood Glucose.: 109 mg/dL (26 Jan 2022 12:08)      Medications:  MEDICATIONS  (STANDING):  amLODIPine   Tablet 10 milliGRAM(s) Oral every 24 hours  clopidogrel Tablet 75 milliGRAM(s) Oral daily  enoxaparin Injectable 40 milliGRAM(s) SubCutaneous at bedtime  insulin lispro (ADMELOG) corrective regimen sliding scale   SubCutaneous Before meals and at bedtime  lactated ringers. 1000 milliLiter(s) (75 mL/Hr) IV Continuous <Continuous>  OLANZapine 2.5 milliGRAM(s) Oral daily  polyethylene glycol 3350 17 Gram(s) Oral every 12 hours  predniSONE   Tablet 60 milliGRAM(s) Oral daily  QUEtiapine 25 milliGRAM(s) Oral at bedtime  senna 2 Tablet(s) Oral at bedtime  tamsulosin 0.4 milliGRAM(s) Oral at bedtime    MEDICATIONS  (PRN):  acetaminophen     Tablet .. 650 milliGRAM(s) Oral every 6 hours PRN Temp greater or equal to 38C (100.4F)  benzonatate 100 milliGRAM(s) Oral three times a day PRN Cough      Anthropometrics upon admission:   Ht: 71" Wt: 140lb BMI: 19.5 IBW: 155lb %IBW: 90%   1/17: 145.2lbs  1/18: 139.2lbs  1/27: 124.7lbs     Weight Change: Pt with 15.3lbs/10.9% weight loss x 1 week. However wt does not reflect previous weights and will request for a reweigh.     Nutrition Focused Physical Exam: Completed [ x  ]  Not Pertinent [   ]  Muscle Wasting- Temporal [  x ]  Clavicle/Pectoral [ x ]  Moderate   Fat Wasting-  Buccal [  x ] Moderate   Suspect Moderate PCM] 2/2 to physical assessment, poor intake and possible wt loss    Estimated energy needs:   1760-2100kcal (25-30kcal/kg)   70-84g protein (1.0-1.2g protein/kg)   Fluids per team.     IBW 70kg used to calculated estimated needs due to possible inaccuracy of CBW. Estimated needs based on [facility] standards of care. Adjusted for age, malnutrition COVID19+.     Subjective:     89 y/o M, POOR HISTORIAN, with PMHx of Dementia (A & O x 1-2 self/place), HTN, colon cancer 2012 (s/p left hemicolectomy - chemo port in place), bilateral carotid artery stenosis (s/p stents 2019), osteoarthritis of hands, and hx of gait instability who presented to [facility] ED BIBEMS on 1/15/22 after fall from bed, admitted for syncopal workup (negative) w course c/b acute hypoxic respiratory failure 2/2 Covid-19 PNA. Pt required High flow for treatment of hypoxia (1/21/22). 1/23/22-pt hypoxic on 4L and complained of trouble breathing and cough. ICU consulted in setting of increased O2 requirements and increased breathing. CXR showed worsening B/L opacifications and started on vanc/zosyn for coverage of HAP-Pt was stepped up to 3 woll for closer vital signs. Palliative consult was in place and was made DNR/DNI by niece (1/24/22). Pt with fluctuating O2 requirements. Started on PO Prednisone 60mg on 1/25.     Pt seen at bedside sleeping. Limited information r/t A&Ox1. As per nurse, pt is NPO due to noticing lots of oral secretion while eating and speech eval is in place for further rec. Upon NFPE, pt presents with moderate muscle wasting in temporal region and clavicles and moderate subcutaneous fat loss in buccals. As per ASPEN guidelines, pt meets criteria for moderate protein calorie malnutrition. Once medically feasible, rec liberalization of diet to regular with the appropriate consistency from speech.     Previous Nutrition Diagnosis: Increased nutrient needs r/t increased demand for kcal/protein AEB age and COVID19+ demands.     Active [  X ]  Resolved [   ]    New PES: Moderate Protein calorie malnutrition r/t increased needs due to illness AEB: muscle wasting and subcutaneous fat loss.     Goal: pt to consume % of meals to reduce s/s of malnutrition.     Recommendations:  1. Once medically feasible, change diet from DASH/TLC to regular to encourage PO intake   2. Follow up with SLP recommendations   3. Monitor PO intake/hydration status   4. Monitor Bowel regimen   5. Follow and honor GOC.     Education: N/A     Risk Level: High [   ] Moderate [   ] Low [ x ] Admitting Diagnosis:   Patient is a 89y old  Male who presents with a chief complaint of Fall (27 Jan 2022 10:16)      PAST MEDICAL & SURGICAL HISTORY:  HTN (hypertension)    High cholesterol    BPH (benign prostatic hyperplasia)    Colon cancer    H/O hemicolectomy    Presence of internal carotid stent        Current Nutrition Order:   Diet, NPO:   Except Medications (01-26-22 @ 18:22)      PO Intake: Good (%) [   ]  Fair (50-75%) [   ] Poor (<25%) [ x  ]    GI Issues: WDL; Last BM: 1/26/22; No N/V/D/C as per flow sheet     Pain: No pain/discomfort as per flowsheet.     Skin Integrity:  Fausto: 16   L abrasion   No PI noted.     Labs:   01-27    143  |  113<H>  |  38<H>  ----------------------------<  100<H>  4.7   |  22  |  1.14    Ca    8.6      27 Jan 2022 06:56  Phos  3.5     01-27  Mg     1.9     01-27      CAPILLARY BLOOD GLUCOSE      POCT Blood Glucose.: 100 mg/dL (27 Jan 2022 11:33)  POCT Blood Glucose.: 87 mg/dL (27 Jan 2022 06:47)  POCT Blood Glucose.: 139 mg/dL (26 Jan 2022 21:47)  POCT Blood Glucose.: 106 mg/dL (26 Jan 2022 17:34)  POCT Blood Glucose.: 109 mg/dL (26 Jan 2022 12:08)      Medications:  MEDICATIONS  (STANDING):  amLODIPine   Tablet 10 milliGRAM(s) Oral every 24 hours  clopidogrel Tablet 75 milliGRAM(s) Oral daily  enoxaparin Injectable 40 milliGRAM(s) SubCutaneous at bedtime  insulin lispro (ADMELOG) corrective regimen sliding scale   SubCutaneous Before meals and at bedtime  lactated ringers. 1000 milliLiter(s) (75 mL/Hr) IV Continuous <Continuous>  OLANZapine 2.5 milliGRAM(s) Oral daily  polyethylene glycol 3350 17 Gram(s) Oral every 12 hours  predniSONE   Tablet 60 milliGRAM(s) Oral daily  QUEtiapine 25 milliGRAM(s) Oral at bedtime  senna 2 Tablet(s) Oral at bedtime  tamsulosin 0.4 milliGRAM(s) Oral at bedtime    MEDICATIONS  (PRN):  acetaminophen     Tablet .. 650 milliGRAM(s) Oral every 6 hours PRN Temp greater or equal to 38C (100.4F)  benzonatate 100 milliGRAM(s) Oral three times a day PRN Cough      Anthropometrics upon admission:   Ht: 71" Wt: 140lb BMI: 19.5 IBW: 155lb %IBW: 90%   1/17: 145.2lbs  1/18: 139.2lbs  1/27: 124.7lbs     Weight Change: Pt with 15.3lbs/10.9% weight loss x 1 week. However wt does not reflect previous weights and will request for a reweigh.     Nutrition Focused Physical Exam: Completed [ x  ]  Not Pertinent [   ]  Muscle Wasting- Temporal [  x ]  Clavicle/Pectoral [ x ]  Moderate   Fat Wasting-  Buccal [  x ] Moderate   Suspect Moderate PCM] 2/2 to physical assessment, poor intake and possible wt loss    Estimated energy needs:   1760-2100kcal (25-30kcal/kg)   70-84g protein (1.0-1.2g protein/kg)   Fluids per team.     IBW 70kg used to calculated estimated needs due to possible inaccuracy of CBW. Estimated needs based on [facility] standards of care. Adjusted for age, malnutrition COVID19+.     Subjective:     89 y/o M, POOR HISTORIAN, with PMHx of Dementia (A & O x 1-2 self/place), HTN, colon cancer 2012 (s/p left hemicolectomy - chemo port in place), bilateral carotid artery stenosis (s/p stents 2019), osteoarthritis of hands, and hx of gait instability who presented to [facility] ED BIBEMS on 1/15/22 after fall from bed, admitted for syncopal workup (negative) w course c/b acute hypoxic respiratory failure 2/2 Covid-19 PNA. Pt required High flow for treatment of hypoxia (1/21/22). 1/23/22-pt hypoxic on 4L and complained of trouble breathing and cough. ICU consulted in setting of increased O2 requirements and increased breathing. CXR showed worsening B/L opacifications and started on vanc/zosyn for coverage of HAP-Pt was stepped up to 3 woll for closer vital signs. Palliative consult was in place and was made DNR/DNI by niece (1/24/22). Pt with fluctuating O2 requirements. Started on PO Prednisone 60mg on 1/25.     Pt seen at bedside sleeping. Limited information r/t A&Ox1. As per nurse, pt is NPO due to noticing lots of oral secretion while eating and speech eval is in place for further rec. Upon NFPE, pt presents with moderate muscle wasting in temporal region and clavicles and moderate subcutaneous fat loss in buccals. As per ASPEN guidelines, pt meets criteria for moderate protein calorie malnutrition. Once medically feasible, rec liberalization of diet to regular with the appropriate consistency from speech.     Previous Nutrition Diagnosis: Increased nutrient needs r/t increased demand for kcal/protein AEB age and COVID19+ demands.     Active [  X ]  Resolved [   ]    New PES: Moderate Protein calorie malnutrition r/t increased needs due to illness AEB: muscle wasting and subcutaneous fat loss.     Goal: pt to consume % of meals to reduce s/s of malnutrition.     Recommendations:  1. Once medically feasible, change diet from DASH/TLC to regular to encourage PO intake   2. Follow up with SLP recommendations   3. Monitor PO intake/hydration status   4. Monitor Bowel regimen   5. Follow and honor GOC.     Education: N/A     Risk Level: High [ x  ] Moderate [   ] Low [ ]

## 2022-01-27 NOTE — SWALLOW BEDSIDE ASSESSMENT ADULT - ORAL PREPARATORY PHASE
Poor bolus acceptance with open mouth posture, despite cues. Reduced bolus control with anterior spillage./Reduced oral grading

## 2022-01-27 NOTE — SWALLOW BEDSIDE ASSESSMENT ADULT - ORAL PHASE
Reduced bolus processing and suspect slow transport posteriorly/Decreased anterior-posterior movement of the bolus

## 2022-01-27 NOTE — DIETITIAN NUTRITION RISK NOTIFICATION - ADDITIONAL COMMENTS/DIETITIAN RECOMMENDATIONS
1. Once medically feasible, change diet from DASH/TLC to regular to encourage PO intake   2. Follow up with SLP recommendations   3. Monitor PO intake/hydration status   4. Monitor Bowel regimen   5. Follow and honor GOC.

## 2022-01-28 NOTE — CHART NOTE - NSCHARTNOTEFT_GEN_A_CORE
Progress Note:   · Provider Specialty	Palliative Care    Reason for Admission:    Reason for Admission:  · Reason for Admission	Fall      · Subjective and Objective:   [facility]iatrics and Palliative Care  Contact Info: Call [phone] (HEAL Line) or message on Microsoft Teams    HPI:  Patient is a 89 y/o M, POOR HISTORIAN, with PMHx of Dementia (A & O x 1-2 self/place), HTN, colon cancer  (s/p left hemicolectomy - chemoport in place), bilateral carotid artery stenosis (s/p stents ), osteoarthritis of hands, hx of imbalance , who presented to [facility] ED [facility] on 1/15/22 after fall from bed. Patient was found lying down supine in living room - wife said he tripped and fell, so she called 911.  Patient states that he does not remember what happened and does not state he fell. Patient denies CP, SOB, palpitations, dizziness, fatigue, headache, n/v/d, abdominal pain, blurry vision, loss of sensation, numbness or tingling.     Upon admit, VS - T 98.3F, HR 67, /76, RR 17, SpO2 97% on RA. Labs significant for ALT 27, AST, blood glucose 113, BUN/Cr 28/1.33, PTT 1.7, INR 1.5, H/H 10.9/34.4, platelet 107, covid +, troponin 0.15.  CT  head prelim read 1/15: no acute ICH or calvarial fracture, posterior-superior R scalp bruising, asymmetric atrophy of L anterior maxillary horn, chronic small vessel ischemic disease. CT cervical spine prelim 1/15: No vertebral body fracture or dislocation. ECG 1/15/22: slow Afib @ 52 VR w/ RBBB, peaked T waves.  Pt admitted to [facility] tele for tele for further management of syncope.  (15 Macario 2022 11:42)    PERTINENT PM/SXH:   HTN (hypertension)    High cholesterol    BPH (benign prostatic hyperplasia)    Colon cancer      H/O hemicolectomy    Presence of internal carotid stent      FAMILY HISTORY:    ITEMS NOT CHECKED ARE NOT PRESENT    SOCIAL HISTORY:   Significant other/partner:  []  Children:  []   Substance hx:  []   Tobacco hx:  [x]   Alcohol hx: []   Home Opioid hx:  [] I-Stop Reference No:  - no active Rx's / see chart note  Living Situation: [x]Home  []Long term care  []Rehab []Other  Jew/Spiritual practice: ; Role of organized Amish [ ] important [ ] some [ ] unable to assess  Coping: [ ] well [ ] with difficulty [ ] poor coping [x ] unable to assess  Support system: [ ] strong [x ] adequate [ ] inadequate    ADVANCE DIRECTIVES:    [x]MOLST  []Living Will  DECISION MAKER(s):  [] Health Care Proxy(s)  [x] Surrogate(s)  [] Guardian           Name(s)/Phone Number(s): Cesilia Frazire (spouse)    BASELINE (I)ADLs (prior to admission):  Ralls: []Total  [] Moderate [x]Dependent    ALLERGIES:  No Known Allergies    MEDICATIONS  (STANDING):  amLODIPine   Tablet 10 milliGRAM(s) Oral every 24 hours  clopidogrel Tablet 75 milliGRAM(s) Oral daily  enoxaparin Injectable 40 milliGRAM(s) SubCutaneous at bedtime  insulin lispro (ADMELOG) corrective regimen sliding scale   SubCutaneous Before meals and at bedtime  lactated ringers. 1000 milliLiter(s) (75 mL/Hr) IV Continuous <Continuous>  OLANZapine 2.5 milliGRAM(s) Oral daily  pantoprazole    Tablet 40 milliGRAM(s) Oral before breakfast  polyethylene glycol 3350 17 Gram(s) Oral every 12 hours  predniSONE   Tablet 60 milliGRAM(s) Oral daily  QUEtiapine 25 milliGRAM(s) Oral at bedtime  senna 2 Tablet(s) Oral at bedtime  tamsulosin 0.4 milliGRAM(s) Oral at bedtime    MEDICATIONS  (PRN):  acetaminophen     Tablet .. 650 milliGRAM(s) Oral every 6 hours PRN Temp greater or equal to 38C (100.4F)  benzonatate 100 milliGRAM(s) Oral three times a day PRN Cough    PRESENT SYMPTOMS: [x]Unable to obtain due to poor mentation/encephalopathy  Source if other than patient:  []Family   []Team     Pain: [ ] yes [x ] no  QOL impact -   Location -                    Aggravating Factors -  Quality -  Radiation -  Timing -  Severity (0-10 scale) -   Minimal Acceptable Level (0-10 scale) -    PAIN AD Score:  [email or web address]/[email or web address] (press ctrl +  left click to view)    Dyspnea:                           []Mild  []Moderate []Severe  Anxiety:                             []Mild []Moderate []Severe  Fatigue:                             []Mild []Moderate []Severe  Nausea:                             []Mild []Moderate []Severe  Loss of Appetite:              []Mild []Moderate []Severe  Constipation:                    []Mild []Moderate []Severe    Other Symptoms:  [x]All other review of systems negative     Palliative Performance Status Version 2: 30 %    [email or web address]_[email or web address]    PHYSICAL EXAM:  GENERAL:  []Alert  []Oriented x   [x]Lethargic  [x]Cachexia  []Unarousable  []Verbal  []Non-Verbal  Behavioral:   []Anxiety  []Delirium []Agitation [x]Cooperative  HEENT:  []Normal   [x]Dry mouth   []ET Tube/Trach  []Oral lesions  PULMONARY:   []Clear [x]Tachypnea  []Audible excessive secretions   [x]Rhonchi        []Right []Left [x]Bilateral  []Crackles        []Right []Left []Bilateral  []Wheezing     []Right []Left []Bilateral  CARDIOVASCULAR:    [x]Regular []Irregular []Tachy  []Harsh []Murmur []Other  GASTROINTESTINAL:  [x]Soft  []Distended   [x]+BS  [x]Non tender []Tender  []PEG []OGT/ NGT  Last BM:     GENITOURINARY:  []Normal [] Incontinent   []Oliguria/Anuria   [xFoley  MUSCULOSKELETAL:   []Normal   x[]Weakness  [x]Bed/Wheelchair bound []Edema  NEUROLOGIC:   []No focal deficits  [x]Cognitive impairment  []Dysphagia []Dysarthria []Paresis []Encephalopathic   SKIN:   [x]Normal   []Pressure ulcer(s)  []Rash    CRITICAL CARE:  [ ]Shock Present  [ ]Septic [ ]Cardiogenic [ ]Neurologic [ ]Hypovolemic  [ ]Vasopressors [ ]Inotropes   [x ]Respiratory failure present [ ]Mechanical Ventilation [ ]Non-invasive ventilatory support [ ]High-Flow  [x ]Acute  [ ]Chronic [x ]Hypoxic  [ ]Hypercarbic  [ ]Other organ failure    Vital Signs Last 24 Hrs  T(C): 36.1 (2022 04:27), Max: 36.1 (2022 00:56)  T(F): 97 (2022 04:27), Max: 97 (2022 00:56)  HR: 46 (2022 09:50) (46 - 84)  BP: 140/62 (2022 08:18) (135/65 - 166/72)  BP(mean): 89 (2022 08:18) (86 - 100)  RR: 32 (2022 09:50) (23 - 52)  SpO2: 92% (2022 09:50) (85% - 99%) I&O's Summary    2022 07:01  -  2022 07:00  --------------------------------------------------------  IN: 1230 mL / OUT: 700 mL / NET: 530 mL    2022 07:01  -  2022 10:23  --------------------------------------------------------  IN: 240 mL / OUT: 200 mL / NET: 40 mL        LABS:                        9.8    11.81 )-----------( 236      ( 2022 08:22 )             30.5   01-26    143  |  115<H>  |  39<H>  ----------------------------<  115<H>  4.7   |  20<L>  |  0.96    Ca    8.0<L>      2022 08:22  Phos  3.8       Mg     2.0             RADIOLOGY & ADDITIONAL STUDIES:      PROTEIN CALORIE MALNUTRITION PRESENT: [ ]mild [ ]moderate [ ]severe [ ]underweight [ ]morbid obesity  []PPSV2 < or = to 30% []significant weight loss  []poor nutritional intake []catabolic state []anasarca     Artificial Nutrition []     REFERRALS:  [x]Social Work  []Case management []PT/OT []Chaplaincy  []Hospice  []Patient/Family Support    DISCUSSION OF CASE: Family - to obtain additional history and to provide emotional support; ( ) -     Care Coordination/Goals of Care Document:                                          Progress Notes    PROGRESS NOTE  Date & Time of Note   2022 12:30    Notes    Notes: Chart reviewed. Case discussed during IDR. As per the MICU team, patient  remains medically acute in respiratory failure with plans to adjust steroid  regimen. CM remains available.       Electronically signed by:  Radha Burks  Electronically signed on:  2022  14:19           PALLIATIVE MEDICINE COORDINATION OF CARE DOCUMENTATION: [x] Inpatient Consult  Non-Face-to-Face prolonged service provided that relates to (face-to-face) care that has or will occur and ongoing patient management, including one or more of the following: - Reviewed documentation from other physicians and other health care professional services - Reviewed medical records and diagnostic / radiology study results - Coordination with patient's support system  ************************************************************************  MEDICATION REVIEW:  - See Medication List Above    ISTOP REFERENCE:   - no active Rx's / see ISTOP Chart Note  - PRN usage: NO PRN'S  ------------------------------------------------------------------------  COORDINATION OF CARE:  - Palliative Care consulted for: GOC / Symptom Management  - Patient (to be) assessed: 22  - Patient previously seen by Palliative Care service: NO    ADVANCE CARE PLANNING  - Code status: FULL  - MOLST reviewed in chart: NONE; None found on Alpha  - HCP/Surrogate: Suki (Niece) [phone]  - [facility] documents: NONE found on [place]  - HCP/Living will/Other Advanced Directives in Alpha: NONE found on Alpha  ------------------------------------------------------------------------  CARE PROVIDER DOCUMENTATION:  - SW/CM notes: Remains medically active  -   -   -     PLAN OF CARE  - Known admissions in past year: 1  - Current admit date:  22  - LOS: 6  - LACE score: 13  - Current dispo plan: TO BE DETERMINED    01-15-22 (11d)  ------------------------------------------------------------------------  - Time Spent/Chart reviewed: 41 Minutes [including time used to gather, review and transfer data]  - Start: 1100  - End: 1141    Prolonged services rendered, as part of this patient's care provided by Palliative Medicine, include: i. chart review for provider and ancillary service documentation, ii. pertinent diagnostics including laboratory and imaging studies, iii. medication review including PRN use, iv. admission history including previous palliative care encounters and GOC notes, v. advance care planning documents including HCP and MOLST forms in Alpha. Part of Palliative Medicine extended evaluation and management also involves coordination of care with our IDT, the primary and consulting teams, and unit CM/SW and Hospice if eligible. Recommendations based on the information gathered and discussed are outlined in the A/P of Palliative notes.      Assessment and Plan:   · Assessment	  89 M with acute respiratory failure due to COVID, Alzheimer's dementia, debility, encounter for palliative care.      Problem/Plan - 1:  ·  Problem: Acute respiratory failure with hypoxia.   ·  Plan: - Patient with severe respiratory failure in the setting of COVID PNA.  - Currently receiving steroids, Remdesivir and condominant antibiotics for suspected bacterial PNA.  - Requiring HiFlow for treatment of hypoxia.   - Xray with evidence of bilateral infiltrates.   - DNR/DNI as per family wishes, as per discussion by primary team.  - Morphine 1mg IV Q4 hours prn.      Problem/Plan - 2:  ·  Problem: 2019 novel coronavirus disease (COVID-19).   ·  Plan: - Currently receiving steroids, Remdesivir and condominant antibiotics for suspected bacterial PNA.  - Requiring HiFlow for treatment of hypoxia.   - Xray with evidence of bilateral infiltrates.     Problem/Plan - 3:  ·  Problem: Alzheimer's dementia, late onset.   ·  Plan: - Evidence of moderate to severe dementia at baseline. Mental status waxes and wanes.  - Currently, difficult to assess proper baseline.  - Supportive care.  - Frequent reorientation.  - Zyprexa available.     Problem/Plan - 4:  ·  Problem: Debility.   ·  Plan: - PPSV2 is 30%.  - Requires full assist with all ADLs.     Problem/Plan - 5:  ·  Problem: Encounter for palliative care.   ·  Plan: - Patient with underlying dementia, other chronic comorbidities, presents with fall, found to have respiratory failure in the setting of COVID PNA.  - Palliative care following for symptoms and GOC.  - Patient is DNR.    Problem 6:  [facility] minutes spent with patients two daughters as well as niece Suki (HCP). Patient unable to participate due to poor mental status. Conversation had over the phone due to restrictions on visitation due to COVID pandemic.   Given progressive deterioration, goals shifted towards comfort. Limit blood draws, imaging and vital signs. Pleasure feeds, with understanding of aspiration risks. Patient is too unstable for transfer to hospice.

## 2022-01-28 NOTE — PROGRESS NOTE ADULT - PROBLEM SELECTOR PLAN 7
Hx of carotid artery stenosis; s/p R PTA in 2019 at [facility]. Vascular consulted- carotid duplex reviewed w/ Dr. Balbuena and chief resident, no need for acute surgical intervention at this time  -Per Vascular, fall unlikely related to carotid artery disease  -stopping Plavix 75mg   -d/c lipitor

## 2022-01-28 NOTE — PROGRESS NOTE ADULT - SUBJECTIVE AND OBJECTIVE BOX
CC: Patient is a 89y old  Male who presents with a chief complaint of Fall (27 Jan 2022 10:16)      INTERVAL EVENTS: JOANNA    SUBJECTIVE / INTERVAL HPI: Patient seen and examined at bedside.     ROS: negative unless otherwise stated above.    VITAL SIGNS:  Vital Signs Last 24 Hrs  T(C): 36.7 (28 Jan 2022 09:24), Max: 36.7 (28 Jan 2022 09:24)  T(F): 98.1 (28 Jan 2022 09:24), Max: 98.1 (28 Jan 2022 09:24)  HR: 79 (28 Jan 2022 09:15) (61 - 96)  BP: 168/84 (28 Jan 2022 08:48) (120/76 - 172/73)  BP(mean): 111 (28 Jan 2022 08:48) (94 - 111)  RR: 36 (28 Jan 2022 09:15) (3 - 47)  SpO2: 100% (28 Jan 2022 09:15) (94% - 100%)      01-27-22 @ 07:01  -  01-28-22 @ 07:00  --------------------------------------------------------  IN: 1650 mL / OUT: 400 mL / NET: 1250 mL        PHYSICAL EXAM:    General: NAD  HEENT: MMM  Neck: supple  Cardiovascular: +S1/S2; RRR  Respiratory: CTA B/L; no W/R/R  Gastrointestinal: soft, NT/ND  Extremities: WWP; no edema, clubbing or cyanosis  Vascular: 2+ radial, DP/PT pulses B/L  Neurological: AAOx3; no focal deficits    MEDICATIONS:  MEDICATIONS  (STANDING):  amLODIPine   Tablet 10 milliGRAM(s) Oral every 24 hours  clopidogrel Tablet 75 milliGRAM(s) Oral daily  enoxaparin Injectable 40 milliGRAM(s) SubCutaneous at bedtime  insulin lispro (ADMELOG) corrective regimen sliding scale   SubCutaneous Before meals and at bedtime  lactated ringers. 1000 milliLiter(s) (75 mL/Hr) IV Continuous <Continuous>  OLANZapine 2.5 milliGRAM(s) Oral daily  polyethylene glycol 3350 17 Gram(s) Oral every 12 hours  predniSONE   Tablet 60 milliGRAM(s) Oral daily  QUEtiapine 25 milliGRAM(s) Oral at bedtime  senna 2 Tablet(s) Oral at bedtime  tamsulosin 0.4 milliGRAM(s) Oral at bedtime    MEDICATIONS  (PRN):  acetaminophen     Tablet .. 650 milliGRAM(s) Oral every 6 hours PRN Temp greater or equal to 38C (100.4F)  benzonatate 100 milliGRAM(s) Oral three times a day PRN Cough      ALLERGIES:  Allergies    No Known Allergies    Intolerances        LABS:                        11.2   22.53 )-----------( 193      ( 28 Jan 2022 08:22 )             35.9     01-28    146<H>  |  114<H>  |  36<H>  ----------------------------<  92  4.4   |  18<L>  |  1.15    Ca    7.9<L>      28 Jan 2022 08:22  Phos  3.6     01-28  Mg     1.8     01-28    TPro  5.4<L>  /  Alb  2.7<L>  /  TBili  1.7<H>  /  DBili  x   /  AST  42<H>  /  ALT  34  /  AlkPhos  161<H>  01-28        CAPILLARY BLOOD GLUCOSE      POCT Blood Glucose.: 97 mg/dL (28 Jan 2022 07:50)      RADIOLOGY & ADDITIONAL TESTS: Reviewed. CC: Patient is a 89y old  Male who presents with a chief complaint of Fall (27 Jan 2022 10:16)    HOSPITAL COURSE  89 y/o M, poor historian with PMHx of Dementia (A & O x 1-2 self/place), HTN, colon cancer 2012 (s/p left hemicolectomy - chemoport in place), bilateral carotid artery stenosis (s/p stents 2019), osteoarthritis of hands, hx of imbalance , who presented to [facility] ED BIBEM[facility] on 1/15/22 after fall from bed vs. syncope, admitted to cardiac tele for syncopal workup, found to be COVID+. Regarding syncope work-up, patient underwent CT Head 1/15/22: No acute intracranial hemorrhage or calvarial fracture, vEEG showed no epileptiform activity. CT Neck/Cervical 1/15/22:  demonstrates a right carotid stent in place with narrowing of its lumen. Vascular consulted- carotid duplex reviewed w/ Dr. Balbuena and chief resident, no need for acute surgical intervention at this time, and fall unlikely related to carotid artery disease. Echo showed normal systolic function w no major valvular abnormalities. Telemetry without any significant arrhythmia, however Patient was found to be in rate-controlled Afib (unclear if new, but pt not on AC). He was started on eliquis 5mg BID then discontinued due to fall risk. Regarding COVID, patient was intermittently spiking fevers but not hypoxic. On 1/18AM, pt noted to desat to high 80s on RA, requiring 2-3L NC. Patient was started on decadron and remdesevir and accepted to COVID medicine under Dr. Redding. Remdesivir d/c'd in setting of JUAN LUIS. Today (1/23) patient was hypoxic on 4L NC, still hypoxic to mid 80s on 6L, then saturated 95% on NRB. ICU consulted in setting of increased O2 requirements and increased work of breathing. ABG showed respiratory alkalosis. CXR showed worsening b/l opacifications, and he was started on vanc/zosyn for coverage of HAP. Patient stepped up to 3wollman telemetry for closer vital signs monitoring and use of HFNC i/s/o worsening covid PNA vs HAP. Pt CXR showing worsening bilateral infiltrates with no consolidations or focal emphasis. BCx negative and patient remained afebrile therefore ABX discontinued. Pt continued on HFNC. Intermittently agitated, placed on mittens with Seroquel 25mg PO qhs and zyprexa PO 2.5mg during the day. Pt at baseline mentation of AAO1-2. Toci given on 1/23. JUAN LUIS resolved, was on prednisone. Wean O2 on HFNC as tolerated. However, pt with increased o2 requriements and becoming more delirious. Pt not able to work with speech/swallow and grossly aspirating on secretions. Family meeting was had today for goals of care and patient was made comfort care. Now for step down to 7UR to hopefully see wife who is also hospitalized here.    INTERVAL EVENTS: JOANNA    SUBJECTIVE / INTERVAL HPI: Patient seen and examined at bedside.     ROS: negative unless otherwise stated above.    VITAL SIGNS:  Vital Signs Last 24 Hrs  T(C): 36.7 (28 Jan 2022 09:24), Max: 36.7 (28 Jan 2022 09:24)  T(F): 98.1 (28 Jan 2022 09:24), Max: 98.1 (28 Jan 2022 09:24)  HR: 79 (28 Jan 2022 09:15) (61 - 96)  BP: 168/84 (28 Jan 2022 08:48) (120/76 - 172/73)  BP(mean): 111 (28 Jan 2022 08:48) (94 - 111)  RR: 36 (28 Jan 2022 09:15) (3 - 47)  SpO2: 100% (28 Jan 2022 09:15) (94% - 100%)      01-27-22 @ 07:01  -  01-28-22 @ 07:00  --------------------------------------------------------  IN: 1650 mL / OUT: 400 mL / NET: 1250 mL        PHYSICAL EXAM:    General: NAD  HEENT: MMM  Neck: supple  Cardiovascular: +S1/S2; RRR  Respiratory: CTA B/L; no W/R/R  Gastrointestinal: soft, NT/ND  Extremities: WWP; no edema, clubbing or cyanosis  Vascular: 2+ radial, DP/PT pulses B/L  Neurological: AAOx0; confused and pulling off O2 no focal deficits    MEDICATIONS:  MEDICATIONS  (STANDING):  amLODIPine   Tablet 10 milliGRAM(s) Oral every 24 hours  clopidogrel Tablet 75 milliGRAM(s) Oral daily  enoxaparin Injectable 40 milliGRAM(s) SubCutaneous at bedtime  insulin lispro (ADMELOG) corrective regimen sliding scale   SubCutaneous Before meals and at bedtime  lactated ringers. 1000 milliLiter(s) (75 mL/Hr) IV Continuous <Continuous>  OLANZapine 2.5 milliGRAM(s) Oral daily  polyethylene glycol 3350 17 Gram(s) Oral every 12 hours  predniSONE   Tablet 60 milliGRAM(s) Oral daily  QUEtiapine 25 milliGRAM(s) Oral at bedtime  senna 2 Tablet(s) Oral at bedtime  tamsulosin 0.4 milliGRAM(s) Oral at bedtime    MEDICATIONS  (PRN):  acetaminophen     Tablet .. 650 milliGRAM(s) Oral every 6 hours PRN Temp greater or equal to 38C (100.4F)  benzonatate 100 milliGRAM(s) Oral three times a day PRN Cough      ALLERGIES:  Allergies    No Known Allergies    Intolerances        LABS:                        11.2   22.53 )-----------( 193      ( 28 Jan 2022 08:22 )             35.9     01-28    146<H>  |  114<H>  |  36<H>  ----------------------------<  92  4.4   |  18<L>  |  1.15    Ca    7.9<L>      28 Jan 2022 08:22  Phos  3.6     01-28  Mg     1.8     01-28    TPro  5.4<L>  /  Alb  2.7<L>  /  TBili  1.7<H>  /  DBili  x   /  AST  42<H>  /  ALT  34  /  AlkPhos  161<H>  01-28        CAPILLARY BLOOD GLUCOSE      POCT Blood Glucose.: 97 mg/dL (28 Jan 2022 07:50)      RADIOLOGY & ADDITIONAL TESTS: Reviewed.

## 2022-01-28 NOTE — PROGRESS NOTE ADULT - PROBLEM SELECTOR PLAN 1
Following family meeting in conjunction with palliative care, decision was made to make patient comfort measures only and to be stepped down to [facility] hopefully see wife who is also hospitalized here.   -Comfort measures only, no blood draws, MEWS exempt   -Dilaudid 0.2 mg Q4hrs PRN for pain/air hunger   -F/u final palliative recs for comfort measures Following family meeting in conjunction with palliative care, decision was made to make patient comfort measures only and to be stepped down to [facility] hopefully see wife who is also hospitalized here.   -Comfort measures   -Dilaudid 0.2 mg Q4hrs PRN for pain/air hunger   -F/u final palliative recs for comfort measures re: mews exempt/blood draws Following family meeting in conjunction with palliative care, decision was made to make patient comfort measures only and to be stepped down to [facility] hopefully see wife who is also hospitalized here.   -Comfort measures, MEWS exempt   -Dilaudid 0.2 mg Q4hrs PRN for pain/air hunger   -F/u final palliative recs for comfort measures

## 2022-01-28 NOTE — PROGRESS NOTE ADULT - ASSESSMENT
89 y/o M, POOR HISTORIAN, with PMHx of Dementia (A & O x 1-2 self/place), HTN, colon cancer 2012 (s/p left hemicolectomy - chemoport in place), bilateral carotid artery stenosis (s/p stents 2019), osteoarthritis of hands, and hx of gait instability who presented to [facility] ED BIBEMS on 1/15/22 after fall from bed, admitted for syncopal workup (negative) w course c/b acute hypoxic respiratory failure 2/2 covid pna vs HAP now stepped up to covid telemetry. Pt made DNR/DNI by sharon. On HFNC and started on Zosyn for broad spectrum coverage. Pt afebrile with BCx negative, Abx discontinued. Pt with fluctuating O2 requirements. Started on PO Prednisone 60mg on 1/25.     NEUROLOGY   #Dementia  Pt AAOx1-2 at baseline, poor historian. More disoriented at night and this morning. Intermittent agitation noted since admission however none overnight since beginning Seroquel. Given extended steroid use for COVID PNA, steroids can be contributing to patient's agitation.   - stopping zyprexa and seroquel  - ordreed PRN olanzapine IM 2.5mg q8  - failed s/s  - f/u palliative recs  - now being made comfort care    PULMONARY  #Acute respiratory failure with hypoxia   2/2 COVID PNA vs HAP. Increasing O2 requirements and work of breathing.  ABG showing respiratory alkalosis and worsening opacifications on CXR. Patient noted to have trouble clearing when he coughs, so suspicion for HAP. Started on Vanc/zosyn on 1/23. Toci given on 1/23. Transitioned from NRB to HFNC. Currently on HFNC @ 50% FiO2 saturating 90-92%.   - stopping steroids  - MRSA swab and BCx negative, therefore abx discontinued   - incentive spirometer  - comfort care now wean to 6L for comfort  - can start glycopyrolate for secretions    #R/o PE  D-Dimer increased to >2x UL when compared to labs from admission. Pt with noticeable hypoxia 2/2 COVID PNA v sequelae. Currently on HFNC. Pt has been on DVT prophylaxis since admission and was intermittently on eliquis. Eliquis was discontinued given elevated fall risk. No tachycardia noted .  - f/u LE dopplers neg  - TTE (1/16): EF 65-70%, normal LV systolic function, size, and wall motion. No evidence of AR. Trace MR.  - stop DVT ppx    CARDIOLOGY   # Carotid artery stenosis.    Hx of carotid artery stenosis; s/p R PTA in 2019 at [facility]. Vascular consulted- carotid duplex reviewed w/ Dr. Balbuena and chief resident, no need for acute surgical intervention at this time  -Per Vascular, fall unlikely related to carotid artery disease  -stopping Plavix 75mg   -d/c lipitor    # Atrial fibrillation.   - Unclear if new afib - not on A/C, not in RVR  - CHADsVASc: 4   - d/c eliquis due to fall risk per cardiology recs   - comfort care    # HTN (hypertension)  Pt with BPs in 160s/80s. Amlodipine 5mg at home.   - stopped amlodipien as NPO  - comfort care  - continue to hold home ACEI given recent JUAN LUIS     # Syncope.    As per outpatient notes, patient has hx of feeling off balance - was attributed to combination of neurodegenerative process, encephalomalacia, spinal stenosis.  - CT Head 1/15/22: No acute intracranial hemorrhage or calvarial fracture.   - Neuro consulted - vEEG negative  - TTE w normal systolic function and no valvular abnormalities    Renal  # Acute kidney injury superimposed on CKD.    Patient with hx of CKD as per MD note - CKD stage 2  - Continue to trend, avoid nephrotoxic agents  - hold ACEI in setting of recent JUAN LUIS   - holding remdesivir  RESOLVED   - c/w maintenance fluids  - mild uptrend today likely due to decreased PO intake    Abdominal  #No acute interventions    Infectious Disease  #COVID-19 PNA v Sequelae   Pt with worsening/increasing O2 requirements. Pt started on Vancomycin + Zosyn on 1/23 for presumed HAP given worsening CXR however patient afebrile with no leukocytosis noted. Pt stepped up from RMF to 3Wo Tele on 1/23. Toci administered on 1/23. Now on HFNC @ 50% FiO2 with fluctuating O2 requirements.   - Switched from decadron to PO Prednisone 60mg   - Repeat blood cultures NGTD and patient afebrile. MRSA swab negative. All abx discontinued  - ESR and CRP elevated  - D-dimer elevated when compared to previous labs  - comfort care    Heme  #Anemia.   Hgb on admission 10.9, currently no signs of active bleeding (no hematochezia, melena, hemoptysis, hematuria). Iron panel consistent w anemia of chronic disease. Folate normal, b12 elevated. Hgb stable.  - trend CBC  - maintain active T&S  - transfuse if Hgb <7    F: as needed  E: replete as needed, keep K>4, Mg >2  D: DASH/TLC  GI Proph: Protonix  Dispo:  3Wo Tele      89 y/o M, POOR HISTORIAN, with PMHx of Dementia (A & O x 1-2 self/place), HTN, colon cancer 2012 (s/p left hemicolectomy - chemoport in place), bilateral carotid artery stenosis (s/p stents 2019), osteoarthritis of hands, and hx of gait instability who presented to [facility] ED BIBEMS on 1/15/22 after fall from bed, admitted for syncopal workup (negative) w course c/b acute hypoxic respiratory failure 2/2 covid pna vs HAP now stepped up to covid telemetry. Pt made DNR/DNI by sharon. On HFNC and started on Zosyn for broad spectrum coverage. Pt afebrile with BCx negative, Abx discontinued. Pt with fluctuating O2 requirements. Started on PO Prednisone 60mg on 1/25.     NEUROLOGY   #Dementia  Pt AAOx1-2 at baseline, poor historian. More disoriented at night and this morning. Intermittent agitation noted since admission however none overnight since beginning Seroquel. Given extended steroid use for COVID PNA, steroids can be contributing to patient's agitation.   - stopping zyprexa and seroquel  - ordreed PRN olanzapine IM 2.5mg q8  - failed s/s  - f/u palliative recs  - now being made comfort care    PULMONARY  #Acute respiratory failure with hypoxia   2/2 COVID PNA vs HAP. Increasing O2 requirements and work of breathing.  ABG showing respiratory alkalosis and worsening opacifications on CXR. Patient noted to have trouble clearing when he coughs, so suspicion for HAP. Started on Vanc/zosyn on 1/23. Toci given on 1/23. Transitioned from NRB to HFNC. Currently on HFNC @ 50% FiO2 saturating 90-92%.   - stopping steroids  - MRSA swab and BCx negative, therefore abx discontinued   - incentive spirometer  - comfort care now wean to 6L for comfort  - can start glycopyrolate for secretions    #R/o PE  D-Dimer increased to >2x UL when compared to labs from admission. Pt with noticeable hypoxia 2/2 COVID PNA v sequelae. Currently on HFNC. Pt has been on DVT prophylaxis since admission and was intermittently on eliquis. Eliquis was discontinued given elevated fall risk. No tachycardia noted .  - f/u LE dopplers neg  - TTE (1/16): EF 65-70%, normal LV systolic function, size, and wall motion. No evidence of AR. Trace MR.  - stop DVT ppx    CARDIOLOGY   # Carotid artery stenosis.    Hx of carotid artery stenosis; s/p R PTA in 2019 at [facility]. Vascular consulted- carotid duplex reviewed w/ Dr. Balbuena and chief resident, no need for acute surgical intervention at this time  -Per Vascular, fall unlikely related to carotid artery disease  -stopping Plavix 75mg   -d/c lipitor    # Atrial fibrillation.   - Unclear if new afib - not on A/C, not in RVR  - CHADsVASc: 4   - d/c eliquis due to fall risk per cardiology recs   - comfort care    # HTN (hypertension)  Pt with BPs in 160s/80s. Amlodipine 5mg at home.   - stopped amlodipien as NPO  - comfort care  - continue to hold home ACEI given recent JUAN LUIS     # Syncope.    As per outpatient notes, patient has hx of feeling off balance - was attributed to combination of neurodegenerative process, encephalomalacia, spinal stenosis.  - CT Head 1/15/22: No acute intracranial hemorrhage or calvarial fracture.   - Neuro consulted - vEEG negative  - TTE w normal systolic function and no valvular abnormalities    Renal  # Acute kidney injury superimposed on CKD.    Patient with hx of CKD as per MD note - CKD stage 2  - Continue to trend, avoid nephrotoxic agents  - hold ACEI in setting of recent JUAN LUIS   - holding remdesivir  RESOLVED   - c/w maintenance fluids  - mild uptrend today likely due to decreased PO intake    Abdominal  #No acute interventions    Infectious Disease  #COVID-19 PNA v Sequelae   Pt with worsening/increasing O2 requirements. Pt started on Vancomycin + Zosyn on 1/23 for presumed HAP given worsening CXR however patient afebrile with no leukocytosis noted. Pt stepped up from RMF to 3Wo Tele on 1/23. Toci administered on 1/23. Now on HFNC @ 50% FiO2 with fluctuating O2 requirements.   - Switched from decadron to PO Prednisone 60mg   - Repeat blood cultures NGTD and patient afebrile. MRSA swab negative. All abx discontinued  - ESR and CRP elevated  - D-dimer elevated when compared to previous labs  - comfort care    Heme  #Anemia.   Hgb on admission 10.9, currently no signs of active bleeding (no hematochezia, melena, hemoptysis, hematuria). Iron panel consistent w anemia of chronic disease. Folate normal, b12 elevated. Hgb stable.  - trend CBC  - maintain active T&S  - transfuse if Hgb <7    F: as needed  E: replete as needed, keep K>4, Mg >2  D: DASH/TLC  GI Proph: Protonix  Dispo:  steppdown for comfort care

## 2022-01-28 NOTE — PROGRESS NOTE ADULT - ASSESSMENT
89 y/o M, POOR HISTORIAN, with PMHx of Dementia (A & O x 1-2 self/place), HTN, colon cancer 2012 (s/p left hemicolectomy - chemoport in place), bilateral carotid artery stenosis (s/p stents 2019), osteoarthritis of hands, and hx of gait instability who presented to [facility] ED BIBEMS on 1/15/22 after fall from bed, admitted for syncopal workup (negative) w course c/b acute hypoxic respiratory failure 2/2 covid pna vs HAP, unable to be weaned from high flow O2, now made comfort care following family discussion with palliative care.

## 2022-01-28 NOTE — PROGRESS NOTE ADULT - PROBLEM SELECTOR PLAN 4
Pt AAOx1-2 at baseline, poor historian. More disoriented at night and this morning. Intermittent agitation noted since admission however none overnight since beginning Seroquel. Given extended steroid use for COVID PNA, steroids can be contributing to patient's agitation.   - stopping zyprexa and seroquel  - ordreed PRN olanzapine IM 2.5mg q8  - failed s/s  - f/u palliative recs  - now being made comfort care

## 2022-01-28 NOTE — PROGRESS NOTE ADULT - SUBJECTIVE AND OBJECTIVE BOX
HOSPITAL COURSE  89 y/o M, poor historian with PMHx of Dementia (A & O x 1-2 self/place), HTN, colon cancer 2012 (s/p left hemicolectomy - chemoport in place), bilateral carotid artery stenosis (s/p stents 2019), osteoarthritis of hands, hx of imbalance , who presented to [facility] ED BIBEMS on 1/15/22 after fall from bed vs. syncope, admitted to cardiac tele for syncopal workup, found to be COVID+. Regarding syncope work-up, patient underwent CT Head 1/15/22: No acute intracranial hemorrhage or calvarial fracture, vEEG showed no epileptiform activity. CT Neck/Cervical 1/15/22:  demonstrates a right carotid stent in place with narrowing of its lumen. Vascular consulted- carotid duplex reviewed w/ Dr. Balbuena and chief resident, no need for acute surgical intervention at this time, and fall unlikely related to carotid artery disease. Echo showed normal systolic function w no major valvular abnormalities. Telemetry without any significant arrhythmia, however Patient was found to be in rate-controlled Afib (unclear if new, but pt not on AC). He was started on eliquis 5mg BID then discontinued due to fall risk. Regarding COVID, patient was intermittently spiking fevers but not hypoxic. On 1/18AM, pt noted to desat to high 80s on RA, requiring 2-3L NC. Patient was started on decadron and remdesevir and accepted to COVID medicine under Dr. Redding. Remdesivir d/c'd in setting of JUAN LUIS. Today (1/23) patient was hypoxic on 4L NC, still hypoxic to mid 80s on 6L, then saturated 95% on NRB. ICU consulted in setting of increased O2 requirements and increased work of breathing. ABG showed respiratory alkalosis. CXR showed worsening b/l opacifications, and he was started on vanc/zosyn for coverage of HAP. Patient stepped up to 3wollman telemetry for closer vital signs monitoring and use of HFNC i/s/o worsening covid PNA vs HAP. Pt CXR showing worsening bilateral infiltrates with no consolidations or focal emphasis. BCx negative and patient remained afebrile therefore ABX discontinued. Pt continued on HFNC. Intermittently agitated, placed on mittens with Seroquel 25mg PO qhs and zyprexa PO 2.5mg during the day. Pt at baseline mentation of AAO1-2. Toci given on 1/23. JUAN LUIS resolved, was on prednisone. Wean O2 on HFNC as tolerated. However, pt with increased o2 requriements and becoming more delirious. Pt not able to work with speech/swallow and grossly aspirating on secretions. Family meeting was had today for goals of care and patient was made comfort care. Now for step down to 7UR to hopefully see wife who is also hospitalized here.     HOSPITAL COURSE  89 y/o M, poor historian with PMHx of Dementia (A & O x 1-2 self/place), HTN, colon cancer 2012 (s/p left hemicolectomy - chemoport in place), bilateral carotid artery stenosis (s/p stents 2019), osteoarthritis of hands, hx of imbalance , who presented to [facility] ED BIBEMS on 1/15/22 after fall from bed vs. syncope, admitted to cardiac tele for syncopal workup, found to be COVID+. Regarding syncope work-up, patient underwent CT Head 1/15/22: No acute intracranial hemorrhage or calvarial fracture, vEEG showed no epileptiform activity. CT Neck/Cervical 1/15/22:  demonstrates a right carotid stent in place with narrowing of its lumen. Vascular consulted- carotid duplex reviewed w/ Dr. Balbuena and chief resident, no need for acute surgical intervention at this time, and fall unlikely related to carotid artery disease. Echo showed normal systolic function w no major valvular abnormalities. Telemetry without any significant arrhythmia, however Patient was found to be in rate-controlled Afib (unclear if new, but pt not on AC). He was started on eliquis 5mg BID then discontinued due to fall risk. Regarding COVID, patient was intermittently spiking fevers but not hypoxic. On 1/18AM, pt noted to desat to high 80s on RA, requiring 2-3L NC. Patient was started on decadron and remdesevir and accepted to COVID medicine under Dr. Redding. Remdesivir d/c'd in setting of JUAN LUIS. Today (1/23) patient was hypoxic on 4L NC, still hypoxic to mid 80s on 6L, then saturated 95% on NRB. ICU consulted in setting of increased O2 requirements and increased work of breathing. ABG showed respiratory alkalosis. CXR showed worsening b/l opacifications, and he was started on vanc/zosyn for coverage of HAP. Patient stepped up to 3wollman telemetry for closer vital signs monitoring and use of HFNC i/s/o worsening covid PNA vs HAP. Pt CXR showing worsening bilateral infiltrates with no consolidations or focal emphasis. BCx negative and patient remained afebrile therefore ABX discontinued. Pt continued on HFNC. Intermittently agitated, placed on mittens with Seroquel 25mg PO qhs and zyprexa PO 2.5mg during the day. Pt at baseline mentation of AAO1-2. Toci given on 1/23. JUAN LUIS resolved, was on prednisone. Wean O2 on HFNC as tolerated. However, pt with increased o2 requriements and becoming more delirious. Pt not able to work with speech/swallow and grossly aspirating on secretions. Family meeting was had today for goals of care and patient was made comfort care. Now for step down to 7UR to hopefully see wife who is also hospitalized here.    SUBJECTIVE / INTERVAL HPI: Patient seen and examined at bedside. Pt on high flow nasal cannula, mildly agitated, asking about whereabouts and well being of wife. Pt denies currently having any pain or difficulties breathing at this time. Denies any nausea, vomiting, fevers, chills, abdominal pain.     VITAL SIGNS:  Vital Signs Last 24 Hrs  T(C): 37.2 (28 Jan 2022 14:02), Max: 37.2 (28 Jan 2022 14:02)  T(F): 99 (28 Jan 2022 14:02), Max: 99 (28 Jan 2022 14:02)  HR: 74 (28 Jan 2022 12:13) (61 - 96)  BP: 146/59 (28 Jan 2022 12:13) (120/76 - 172/73)  BP(mean): 99 (28 Jan 2022 12:13) (94 - 111)  RR: 22 (28 Jan 2022 12:13) (20 - 47)  SpO2: 100% (28 Jan 2022 12:13) (94% - 100%)    PHYSICAL EXAM:    General: Frail, cachectic elderly male, mildly agitated, on high flow NC   HEENT: NC/AT; PERRL, anicteric sclera; dry mucous membranes  Neck: supple  Cardiovascular: +S1/S2, RRR  Respiratory: CTA B/L; no W/R/R  Gastrointestinal: soft, NT/ND; +BSx4, very cachectic, scaphoid abdomen   Extremities: WWP; no edema, clubbing or cyanosis  Vascular: 2+ radial, DP/PT pulses B/L  Neurological: AAOx3 to person, place and time, mild agitation, asking for wife     MEDICATIONS:  MEDICATIONS  (STANDING):    MEDICATIONS  (PRN):  HYDROmorphone  Injectable 0.2 milliGRAM(s) IV Push every 4 hours PRN air hunger RR >22  OLANZapine Injectable 2.5 milliGRAM(s) IntraMuscular every 8 hours PRN agitation      ALLERGIES:  Allergies    No Known Allergies    Intolerances        LABS:                        11.2   22.53 )-----------( 193      ( 28 Jan 2022 08:22 )             35.9     01-28    146<H>  |  114<H>  |  36<H>  ----------------------------<  92  4.4   |  18<L>  |  1.15    Ca    7.9<L>      28 Jan 2022 08:22  Phos  3.6     01-28  Mg     1.8     01-28    TPro  5.4<L>  /  Alb  2.7<L>  /  TBili  1.7<H>  /  DBili  x   /  AST  42<H>  /  ALT  34  /  AlkPhos  161<H>  01-28        CAPILLARY BLOOD GLUCOSE      POCT Blood Glucose.: 94 mg/dL (28 Jan 2022 11:42)      RADIOLOGY & ADDITIONAL TESTS: Reviewed.     HOSPITAL COURSE  89 y/o M, poor historian with PMHx of Dementia (A & O x 1-2 self/place), HTN, colon cancer 2012 (s/p left hemicolectomy - chemoport in place), bilateral carotid artery stenosis (s/p stents 2019), osteoarthritis of hands, hx of imbalance , who presented to [facility] ED BIBEMS on 1/15/22 after fall from bed vs. syncope, admitted to cardiac tele for syncopal workup, found to be COVID+. Regarding syncope work-up, patient underwent CT Head 1/15/22: No acute intracranial hemorrhage or calvarial fracture, vEEG showed no epileptiform activity. CT Neck/Cervical 1/15/22:  demonstrates a right carotid stent in place with narrowing of its lumen. Vascular consulted- carotid duplex reviewed w/ Dr. Balbuena and chief resident, no need for acute surgical intervention at this time, and fall unlikely related to carotid artery disease. Echo showed normal systolic function w no major valvular abnormalities. Telemetry without any significant arrhythmia, however Patient was found to be in rate-controlled Afib (unclear if new, but pt not on AC). He was started on eliquis 5mg BID then discontinued due to fall risk. Regarding COVID, patient was intermittently spiking fevers but not hypoxic. On 1/18AM, pt noted to desat to high 80s on RA, requiring 2-3L NC. Patient was started on decadron and remdesevir and accepted to COVID medicine under Dr. Redding. Remdesivir d/c'd in setting of JUAN LUIS. Today (1/23) patient was hypoxic on 4L NC, still hypoxic to mid 80s on 6L, then saturated 95% on NRB. ICU consulted in setting of increased O2 requirements and increased work of breathing. ABG showed respiratory alkalosis. CXR showed worsening b/l opacifications, and he was started on vanc/zosyn for coverage of HAP. Patient stepped up to 3[facility]man telemetry for closer vital signs monitoring and use of HFNC i/s/o worsening covid PNA vs HAP. Pt CXR showing worsening bilateral infiltrates with no consolidations or focal emphasis. BCx negative and patient remained afebrile therefore ABX discontinued. Pt continued on HFNC. Intermittently agitated, placed on mittens with Seroquel 25mg PO qhs and zyprexa PO 2.5mg during the day. Pt at baseline mentation of AAO1-2. Toci given on 1/23. JUAN LUIS resolved, was on prednisone. Wean O2 on HFNC as tolerated. However, pt with increased o2 requriements and becoming more delirious. Pt not able to work with speech/swallow and grossly aspirating on secretions. Family meeting was had today for goals of care and patient was made comfort care. Now for step down to 7UR to hopefully see wife who is also hospitalized here.    SUBJECTIVE / INTERVAL HPI: Patient seen and examined at bedside. Pt on high flow nasal cannula, mildly agitated, asking about whereabouts and well being of wife. Pt denies currently having any pain or difficulties breathing at this time. Denies any nausea, vomiting, fevers, chills, abdominal pain.     VITAL SIGNS:  Vital Signs Last 24 Hrs  T(C): 37.2 (28 Jan 2022 14:02), Max: 37.2 (28 Jan 2022 14:02)  T(F): 99 (28 Jan 2022 14:02), Max: 99 (28 Jan 2022 14:02)  HR: 74 (28 Jan 2022 12:13) (61 - 96)  BP: 146/59 (28 Jan 2022 12:13) (120/76 - 172/73)  BP(mean): 99 (28 Jan 2022 12:13) (94 - 111)  RR: 22 (28 Jan 2022 12:13) (20 - 47)  SpO2: 100% (28 Jan 2022 12:13) (94% - 100%)    PHYSICAL EXAM:    General: Frail, cachectic elderly male, mildly agitated, on high flow NC   HEENT: NC/AT; PERRL, anicteric sclera; dry mucous membranes  Neck: supple  Cardiovascular: +S1/S2, RRR  Respiratory: mild crackles auscultated b/l lower lung bases   Gastrointestinal: soft, NT/ND; +BSx4, very cachectic, scaphoid abdomen   Extremities: WWP; no edema, clubbing or cyanosis  Vascular: 2+ radial, DP/PT pulses B/L  Neurological: AAOx3 to person, place and time, mild agitation, asking for wife     MEDICATIONS:  MEDICATIONS  (STANDING):    MEDICATIONS  (PRN):  HYDROmorphone  Injectable 0.2 milliGRAM(s) IV Push every 4 hours PRN air hunger RR >22  OLANZapine Injectable 2.5 milliGRAM(s) IntraMuscular every 8 hours PRN agitation      ALLERGIES:  Allergies    No Known Allergies    Intolerances        LABS:                        11.2   22.53 )-----------( 193      ( 28 Jan 2022 08:22 )             35.9     01-28    146<H>  |  114<H>  |  36<H>  ----------------------------<  92  4.4   |  18<L>  |  1.15    Ca    7.9<L>      28 Jan 2022 08:22  Phos  3.6     01-28  Mg     1.8     01-28    TPro  5.4<L>  /  Alb  2.7<L>  /  TBili  1.7<H>  /  DBili  x   /  AST  42<H>  /  ALT  34  /  AlkPhos  161<H>  01-28        CAPILLARY BLOOD GLUCOSE      POCT Blood Glucose.: 94 mg/dL (28 Jan 2022 11:42)      RADIOLOGY & ADDITIONAL TESTS: Reviewed.

## 2022-01-29 NOTE — PROGRESS NOTE ADULT - PROBLEM SELECTOR PLAN 5
Stable  - continue Enalapril 10 mg and Amlodipine 5 mg once daily
- Patient with underlying dementia, other chronic comorbidities, presents with fall, found to have respiratory failure in the setting of COVID PNA.  - Palliative care following for symptoms and GOC.  - Patient is DNR.  - Will readdress goals depending on clinical course.
Stable  - continue Enalapril 10 mg and Amlodipine 5 mg once daily
Mildly elevated liver enzymes, up from yesterday. Could be secondary to COVID.   - Trend for now  - hold statin
Mildly elevated liver enzymes, up from yesterday. Could be secondary to COVID.   - Trend for now  - hold statin
Stable  - continue Enalapril 10 mg and Amlodipine 5 mg once daily
Resolved  Mildly elevated liver enzymes, up from yesterday. Could be secondary to COVID.   - Trend for now  - hold statin
Mildly elevated liver enzymes, up from yesterday. Could be secondary to COVID.   - Trend for now  - hold statin
Hgb on admission 10.9, currently no signs of active bleeding (no hematochezia, melena, hemoptysis, hematuria). Iron panel consistent w anemia of chronic disease. Folate normal, b12 elevated. Hgb stable.  -No active intervention at this time as patient is now made comfort care
Mildly elevated liver enzymes, up from yesterday. Could be secondary to COVID.   - Trend for now  - hold statin
Hgb on admission 10.9, currently no signs of active bleeding (no hematochezia, melena, hemoptysis, hematuria). Iron panel consistent w anemia of chronic disease. Folate normal, b12 elevated. Hgb stable.  -No active intervention at this time as patient is now made comfort care

## 2022-01-29 NOTE — PROGRESS NOTE ADULT - PROBLEM SELECTOR PLAN 4
Pt AAOx1-2 at baseline, poor historian. More disoriented at night and this morning. Intermittent agitation noted since admission however none overnight since beginning Seroquel. Given extended steroid use for COVID PNA, steroids can be contributing to patient's agitation.   - ordreed PRN olanzapine IM 2.5mg q8 for agitation  - now being made comfort care

## 2022-01-29 NOTE — PROGRESS NOTE ADULT - PROBLEM SELECTOR PLAN 2
- Currently receiving steroids, Remdesivir and condominant antibiotics for suspected bacterial PNA.  - Requiring HiFlow for treatment of hypoxia.   - Xray with evidence of bilateral infiltrates.
Plan above
Hx of carotid artery stenosis; s/p R PTA in 2019 at [facility]  -CT Neck/Cervical 1/15/22: No vertebral body fracture or dislocation. Limited evaluation of the neck demonstrates a right carotid stent in place with narrowing of its lumen  - Vascular consulted for right carotid narrowing of stent recs pending carotid duplex. continue Plavix 75mg and  Eliquis 5mg BID   - Continue Atorva (TIC for Crestor)  -f/u carotid duplex
Hx of carotid artery stenosis; s/p R PTA in 2019 at [facility]  -CT Neck/Cervical 1/15/22: No vertebral body fracture or dislocation. Limited evaluation of the neck demonstrates a right carotid stent in place with narrowing of its lumen  -Vascular consulted- carotid duplex reviewed w/ Dr. Balbuena and chief resident, no need for acute surgical intervention at this time  -Per Vascular, fall unlikely related to carotid artery disease  -Patient should follow-up with Dr. Balbuena 1 week after discharge. Please call [phone] to schedule an appointment  -continue Plavix 75mg and  Eliquis 5mg BID   -Continue Atorva (TIC for Crestor)
Hx of carotid artery stenosis; s/p R PTA in 2019 at [facility]  -CT Neck/Cervical 1/15/22: No vertebral body fracture or dislocation. Limited evaluation of the neck demonstrates a right carotid stent in place with narrowing of its lumen  - Vascular consulted for right carotid narrowing of stent recs pending carotid duplex. Started on Plavix 75mg and will continue Eliquis 5mg BID   - Continue Atorva (TIC for Crestor)
2/2 COVID PNA vs HAP. Increasing O2 requirements and work of breathing.  ABG showing respiratory alkalosis and worsening opacifications on CXR. Patient noted to have trouble clearing when he coughs, so suspicion for HAP. Started on Vanc/zosyn on 1/23. Toci given on 1/23. Transitioned from NRB to HFNC. Currently on HFNC @ 50% FiO2 saturating 90-92%.   - stopping steroids  - MRSA swab and BCx negative, therefore abx discontinued   - incentive spirometer  - comfort care now wean to 6L for comfort  - can start glycopyrolate for secretions
Plan above
Plan above
2/2 COVID PNA vs HAP. Increasing O2 requirements and work of breathing.  ABG showing respiratory alkalosis and worsening opacifications on CXR. Patient noted to have trouble clearing when he coughs, so suspicion for HAP. Started on Vanc/zosyn on 1/23. Toci given on 1/23. Transitioned from NRB to HFNC. Currently on HFNC @ 50% FiO2 saturating 90-92%.   - stopping steroids  - MRSA swab and BCx negative, therefore abx discontinued   - incentive spirometer  - can start glycopyrolate for secretions  -Comfort care now
Plan above
Plan above

## 2022-01-29 NOTE — PROGRESS NOTE ADULT - PROBLEM SELECTOR PROBLEM 2
2019 novel coronavirus disease (COVID-19)
Carotid artery stenosis
Carotid artery stenosis
2019 novel coronavirus disease (COVID-19)
Acute respiratory failure with hypoxia
Carotid artery stenosis
Acute respiratory failure with hypoxia
2019 novel coronavirus disease (COVID-19)

## 2022-01-29 NOTE — PROGRESS NOTE ADULT - PROBLEM SELECTOR PROBLEM 4
Debility
Alzheimer's dementia, late onset
COVID-19
Alzheimer's dementia, late onset
COVID-19
Syncope
COVID-19
Syncope
Syncope

## 2022-01-29 NOTE — PROGRESS NOTE ADULT - PROBLEM SELECTOR PROBLEM 6
Stage 3 chronic kidney disease
Anemia
Anemia
Atrial fibrillation
Anemia
Atrial fibrillation
Stage 3 chronic kidney disease
Anemia
Stage 3 chronic kidney disease
Anemia

## 2022-01-29 NOTE — PROGRESS NOTE ADULT - PROBLEM SELECTOR PROBLEM 7
Carotid artery stenosis
Hyperlipidemia
Carotid artery stenosis
Hyperlipidemia
Carotid artery stenosis
Carotid artery stenosis
Hyperlipidemia
Carotid artery stenosis

## 2022-01-29 NOTE — PROGRESS NOTE ADULT - PROVIDER SPECIALTY LIST ADULT
Internal Medicine
Neurology
Neurology
Cardiology
Internal Medicine
Internal Medicine
Neurology
Neurology
Vascular Surgery
Internal Medicine
Cardiology
Cardiology
Palliative Care
Internal Medicine
Cardiology
Internal Medicine

## 2022-01-29 NOTE — PROGRESS NOTE ADULT - PROBLEM SELECTOR PROBLEM 9
HTN (hypertension)
Nutrition, metabolism, and development symptoms
HTN (hypertension)
Nutrition, metabolism, and development symptoms
HTN (hypertension)

## 2022-01-29 NOTE — PROGRESS NOTE ADULT - PROBLEM SELECTOR PLAN 3
Pt with acute hypoxic respiratory failure 2/2 covid 19 pna   -Plan as above
- Evidence of moderate to severe dementia at baseline. Mental status waxes and wanes.  - Currently, difficult to assess proper baseline.  - Supportive care.  - Frequent reorientation.  - Zyprexa available.
Patient with hx of CKD as per MD note - CKD stage 2, now uptrending Cr likely pre renal in setting of poor po intake and soft blood pressures. FeNa 0.1.  - s/p 500cc LR bolus  - will recheck tomorrow and see how much he is eating, may start maintenance fluids  - Continue to trend, avoid nephrotoxic agents  - hold ACEI in setting of JUAN LUIS and soft BPs
-Unclear if new afib - not on A/C  - CHADsVASc: 4   - Started Eliquis 5mg BID, not on BB as patient with HR 40-60s this AM
Patient with hx of CKD as per MD note - CKD stage 2, Cr improving after bolus, PO intake, and holding ACEI. Cr improving.  - Continue to trend, avoid nephrotoxic agents  - hold ACEI in setting of JUAN LUIS and soft BPs  - holding remdesivir
Pt with acute hypoxic respiratory failure 2/2 covid 19 pna   -Plan as above
Patient with hx of CKD as per MD note - CKD stage 2, Cr improving after bolus, PO intake, and holding ACEI. BUN uprending, pt has not had bowel movement in several days, no melena.  - Continue to trend, avoid nephrotoxic agents  - hold ACEI in setting of JUAN LUIS and soft BPs
-Unclear if new afib - not on A/C  - CHADsVASc: 4   - continue Eliquis 5mg BID, not on BB as patient with HR 40-60s this AM
-Unclear if new afib - not on A/C  - CHADsVASc: 4   - continue Eliquis 5mg BID, not on BB as patient with HR 40-60s this AM
Patient with hx of CKD as per MD note - CKD stage 2, Cr improving after bolus, PO intake, and holding ACEI. BUN uprending, pt has not had bowel movement in several days, no melena.  - Continue to trend, avoid nephrotoxic agents  - hold ACEI in setting of JUAN LUIS and soft BPs
Patient with hx of CKD as per MD note - CKD stage 2, Cr improving after bolus, PO intake, and holding ACEI. BUN uprending, pt has not had bowel movement in several days, no melena.  - Continue to trend, avoid nephrotoxic agents  - hold ACEI in setting of JUAN LUIS and soft BPs

## 2022-01-29 NOTE — PROGRESS NOTE ADULT - PROBLEM SELECTOR PROBLEM 8
HTN (hypertension)
Atrial fibrillation
HTN (hypertension)
Atrial fibrillation
Atrial fibrillation

## 2022-01-29 NOTE — PROGRESS NOTE ADULT - PROBLEM SELECTOR PLAN 8
Pt with BPs in 160s/80s. Amlodipine 5mg at home.   - stopped amlodipien as NPO  - comfort care  - continue to hold home ACEI given recent JUAN LUIS
Pt with BPs in 160s/80s. Amlodipine 5mg at home.   - stopped amlodipien as NPO  - comfort care  - continue to hold home ACEI given recent JUAN LUIS
-Unclear if new afib - not on A/C ( not in RVR  - CHADsVASc: 4   - continue Eliquis 5mg BID, not on BB as patient intermittently bradycardic
-Unclear if new afib - not on A/C, not in RVR  - CHADsVASc: 4   - continue Eliquis 5mg BID, not on BB as patient intermittently bradycardic
-Unclear if new afib - not on A/C, not in RVR  - CHADsVASc: 4   - d/c eliquis due to fall risk
-Unclear if new afib - not on A/C, not in RVR  - CHADsVASc: 4   - continue Eliquis 5mg BID, not on BB as patient intermittently bradycardic
-Unclear if new afib - not on A/C, not in RVR  - CHADsVASc: 4   - continue Eliquis 5mg BID, not on BB as patient intermittently bradycardic

## 2022-01-29 NOTE — PROGRESS NOTE ADULT - PROBLEM SELECTOR PLAN 9
Stable, BPs today soft  -c/w Amlodipine 5 mg once daily with holding parameters  -d/c ACEI in setting of soft BP and JUAN LUIS
F: as needed  E: replete as needed, keep K>4, Mg >2  D: DASH/TLC  GI Proph: Protonix  Dispo:  steppdown for comfort care
F: as needed  E: replete as needed, keep K>4, Mg >2  D: DASH/TLC  GI Proph: Protonix  Dispo:  steppdown for comfort care

## 2022-01-29 NOTE — PROGRESS NOTE ADULT - ASSESSMENT
89 y/o M, POOR HISTORIAN, with PMHx of Dementia (A & O x 1-2 self/place), HTN, colon cancer 2012 (s/p left hemicolectomy - chemoport in place), bilateral carotid artery stenosis (s/p stents 2019), osteoarthritis of hands, and hx of gait instability who presented to [facility] ED BIBEMS on 1/15/22 after fall from bed, admitted for syncopal workup (negative) w course c/b acute hypoxic respiratory failure 2/2 covid pna vs HAP, unable to be weaned from high flow O2, now made comfort care following family discussion with palliative care.

## 2022-01-29 NOTE — PROGRESS NOTE ADULT - PROBLEM SELECTOR PROBLEM 3
Acute kidney injury superimposed on CKD
2019 novel coronavirus disease (COVID-19)
Acute kidney injury superimposed on CKD
Atrial fibrillation
Alzheimer's dementia, late onset
Acute kidney injury superimposed on CKD
Atrial fibrillation
Acute kidney injury superimposed on CKD
2019 novel coronavirus disease (COVID-19)
Atrial fibrillation
Acute kidney injury superimposed on CKD

## 2022-01-29 NOTE — PROGRESS NOTE ADULT - PROBLEM SELECTOR PROBLEM 1
Syncope
Acute respiratory failure with hypoxia
Comfort measures only status
Syncope
Syncope
Comfort measures only status
Acute respiratory failure with hypoxia

## 2022-01-29 NOTE — PROGRESS NOTE ADULT - PROBLEM SELECTOR PLAN 6
Hgb on admission 10.9, currently no signs of active bleeding (no hematochezia, melena, hemoptysis, hematuria). Iron panel consistent w anemia of chronic disease. Folate normal, b12 elevated. Hgb stable.  - trend CBC  - maintain active T&S  - transfuse if Hgb <7
Patient with hx of CKD as per MD note - CKD stage 2  - Cr 1.0 12/2021, on admission: 1.33, this AM 1.3  - Continue to trend, avoid nephrotoxic agents
- Unclear if new afib - not on A/C, not in RVR  - CHADsVASc: 4   - d/c eliquis due to fall risk per cardiology recs   - comfort care
Patient with hx of CKD as per MD note - CKD stage 2  - Cr 1.0 12/2021, on admission: 1.33, this AM 1.3 stable   - Continue to trend, avoid nephrotoxic agents
Hgb on admission 10.9, currently no signs of active bleeding (no hematochezia, melena, hemoptysis, hematuria). Iron panel consistent w anemia of chronic disease. Folate normal, b12 elevated. Hgb stable.  - trend CBC  - maintain active T&S  - transfuse if Hgb <7
Hgb on admission 10.9, currently no signs of active bleeding (no hematochezia, melena, hemoptysis, hematuria). Iron panel consistent w anemia of chronic disease. Folate normal, b12 elevated. Hgb stable.  - trend CBC  - maintain active T&S  - transfuse if Hgb <7
Patient with hx of CKD as per MD note - CKD stage 2  - Cr 1.0 12/2021, on admission: 1.33, this AM 1.3 stable   - Continue to trend, avoid nephrotoxic agents
Hgb on admission 10.9, currently no signs of active bleeding (no hematochezia, melena, hemoptysis, hematuria). Iron panel consistent w anemia of chronic disease. Folate normal, b12 elevated. Hgb stable.  - trend CBC
- Unclear if new afib - not on A/C, not in RVR  - CHADsVASc: 4   - d/c eliquis due to fall risk per cardiology recs   - comfort care
Hgb on admission 10.9, currently no signs of active bleeding (no hematochezia, melena, hemoptysis, hematuria). Iron panel consistent w anemia of chronic disease. Folate normal, b12 elevated  - trend CBC  - maintain active T&S  - transfuse if Hgb <7

## 2022-01-29 NOTE — PROGRESS NOTE ADULT - NUTRITIONAL ASSESSMENT
This patient has been assessed with a concern for Malnutrition and has been determined to have a diagnosis/diagnoses of Moderate protein-calorie malnutrition.    This patient is being managed with:   Diet Soft and Bite Sized-  Entered: Jan 28 2022  4:34PM    
This patient has been assessed with a concern for Malnutrition and has been determined to have a diagnosis/diagnoses of Moderate protein-calorie malnutrition.    This patient is being managed with:   Diet NPO-  Except Medications  Entered: Jan 26 2022  6:22PM    
This patient has been assessed with a concern for Malnutrition and has been determined to have a diagnosis/diagnoses of Moderate protein-calorie malnutrition.    This patient is being managed with:   Diet NPO-  Except Medications  Entered: Jan 26 2022  6:22PM

## 2022-01-29 NOTE — PROGRESS NOTE ADULT - PROBLEM SELECTOR PROBLEM 5
Anemia
Transaminitis
Encounter for palliative care
Hypertension
Transaminitis
Transaminitis
Anemia
Hypertension
Hypertension
Transaminitis
Transaminitis

## 2022-01-29 NOTE — PROGRESS NOTE ADULT - SUBJECTIVE AND OBJECTIVE BOX
OVERNIGHT EVENTS: JOANNA.     SUBJECTIVE / INTERVAL HPI: Patient seen and examined at bedside. Pt on high flow, mildly distressed, but not in any acute distress. Denies any pain or shortness of breath at this time.     VITAL SIGNS:  Vital Signs Last 24 Hrs  T(C): 36.3 (29 Jan 2022 06:13), Max: 37.2 (28 Jan 2022 14:02)  T(F): 97.4 (29 Jan 2022 06:13), Max: 99 (28 Jan 2022 14:02)  HR: 88 (29 Jan 2022 06:13) (59 - 92)  BP: 165/54 (29 Jan 2022 06:13) (138/72 - 181/82)  BP(mean): 118 (28 Jan 2022 16:26) (99 - 118)  RR: 19 (29 Jan 2022 06:13) (18 - 33)  SpO2: 95% (29 Jan 2022 06:13) (89% - 100%)    PHYSICAL EXAM:    General: Frail, cachectic elderly male, on high flow NC.   HEENT: NC/AT; PERRL, anicteric sclera; dry mucous membranes  Neck: supple  Cardiovascular: +S1/S2, RRR  Respiratory: mild crackles in lower lung fields auscultated  Gastrointestinal: soft, NT/ND; +BSx4, cachectic   Extremities: WWP; no edema, clubbing or cyanosis  Vascular: 2+ radial, DP/PT pulses B/L  Neurological: AAOx3    MEDICATIONS:  MEDICATIONS  (STANDING):    MEDICATIONS  (PRN):  glycopyrrolate Injectable 0.2 milliGRAM(s) IV Push every 4 hours PRN Secretions  HYDROmorphone  Injectable 0.2 milliGRAM(s) IV Push every 4 hours PRN air hunger RR >22  LORazepam   Injectable 0.5 milliGRAM(s) IV Push every 6 hours PRN Anxiety  OLANZapine Injectable 2.5 milliGRAM(s) IntraMuscular every 8 hours PRN agitation      ALLERGIES:  Allergies    No Known Allergies    Intolerances        LABS:                        11.2   22.53 )-----------( 193      ( 28 Jan 2022 08:22 )             35.9     01-28    146<H>  |  114<H>  |  36<H>  ----------------------------<  92  4.4   |  18<L>  |  1.15    Ca    7.9<L>      28 Jan 2022 08:22  Phos  3.6     01-28  Mg     1.8     01-28    TPro  5.4<L>  /  Alb  2.7<L>  /  TBili  1.7<H>  /  DBili  x   /  AST  42<H>  /  ALT  34  /  AlkPhos  161<H>  01-28        CAPILLARY BLOOD GLUCOSE      POCT Blood Glucose.: 94 mg/dL (28 Jan 2022 11:42)      RADIOLOGY & ADDITIONAL TESTS: Reviewed.

## 2022-01-29 NOTE — PROGRESS NOTE ADULT - PROBLEM SELECTOR PLAN 1
Following family meeting in conjunction with palliative care, decision was made to make patient comfort measures only and to be stepped down to [facility] hopefully see wife who is also hospitalized here.   -Comfort measures, MEWS exempt   -Dilaudid 0.2 mg Q4hrs PRN for pain/air hunger   -Glycopyrrolate PRN for secretions   -Ativan PRN anxiety

## 2022-01-29 NOTE — PROGRESS NOTE ADULT - PROBLEM SELECTOR PLAN 7
Hx of carotid artery stenosis; s/p R PTA in 2019 at [facility]. Vascular consulted- carotid duplex reviewed w/ Dr. Balbuena and chief resident, no need for acute surgical intervention at this time  -Per Vascular, fall unlikely related to carotid artery disease  -stopping Plavix 75mg   -d/c lipitor

## 2022-01-30 NOTE — DISCHARGE NOTE FOR THE EXPIRED PATIENT - HOSPITAL COURSE
89 y/o M, poor historian with PMHx of Dementia (A & O x 1-2 self/place), HTN, colon cancer 2012 (s/p left hemicolectomy - chemoport in place), bilateral carotid artery stenosis (s/p stents 2019), osteoarthritis of hands, hx of imbalance , who presented to [facility] ED BIBEMS on 1/15/22 after fall from bed vs. syncope, admitted to cardiac tele for syncopal workup, found to be COVID+. Regarding syncope work-up, patient underwent CT Head 1/15/22: No acute intracranial hemorrhage or calvarial fracture, vEEG showed no epileptiform activity. CT Neck/Cervical 1/15/22:  demonstrates a right carotid stent in place with narrowing of its lumen. Vascular consulted- carotid duplex reviewed w/ Dr. Balbuena and chief resident, no need for acute surgical intervention at this time, and fall unlikely related to carotid artery disease. Echo showed normal systolic function w no major valvular abnormalities. Telemetry without any significant arrhythmia, however Patient was found to be in rate-controlled Afib (unclear if new, but pt not on AC). He was started on eliquis 5mg BID then discontinued due to fall risk. Regarding COVID, patient was intermittently spiking fevers but not hypoxic. On 1/18AM, pt noted to desat to high 80s on RA, requiring 2-3L NC. Patient was started on decadron and remdesevir and accepted to COVID medicine under Dr. Redding. Remdesivir d/c'd in setting of JUAN LUIS. Today (1/23) patient was hypoxic on 4L NC, still hypoxic to mid 80s on 6L, then saturated 95% on NRB. ICU consulted in setting of increased O2 requirements and increased work of breathing. ABG showed respiratory alkalosis. CXR showed worsening b/l opacifications, and he was started on vanc/zosyn for coverage of HAP. Patient stepped up to [facility] telemetry for closer vital signs monitoring and use of HFNC i/s/o worsening covid PNA vs HAP. Pt CXR showing worsening bilateral infiltrates with no consolidations or focal emphasis. BCx negative and patient remained afebrile therefore ABX discontinued. Pt continued on HFNC. Intermittently agitated, placed on mittens with Seroquel 25mg PO qhs and zyprexa PO 2.5mg during the day. Pt at baseline mentation of AAO1-2. Toci given on 1/23. JUAN LUIS resolved, was on prednisone. Wean O2 on HFNC as tolerated. However, pt with increased o2 requriements and becoming more delirious. Pt not able to work with speech/swallow and grossly aspirating on secretions. Family meeting was had today for goals of care and patient was made comfort care.     Paged that patient was not breathing. Examined the patient.  No spontaneous movements were present. There was not response to verbal, tactile, or painful stimuli. Pupils were mid-dilated and fixed. No breath sounds were appreciated over both lung fields. No femoral, radial, or carotid pulses were palpable. No heart sounds were auscultated over entire precordium. Patient pronounced dead at 12:25 AM. Family, resident, and attending physician, were notified.   Wife was notified at bedside. HCP Suki was notified.     Cause of Death: Cardiopulmonary

## 2022-02-03 ENCOUNTER — APPOINTMENT (OUTPATIENT)
Dept: VASCULAR SURGERY | Facility: CLINIC | Age: 87
End: 2022-02-03

## 2022-02-16 NOTE — DIETITIAN INITIAL EVALUATION ADULT. - PROBLEM SELECTOR PROBLEM 9
BPH (benign prostatic hyperplasia) Xenograft Text: The defect edges were debeveled with a #15 scalpel blade.  Given the location of the defect, shape of the defect and the proximity to free margins a xenograft was deemed most appropriate.  The graft was then trimmed to fit the size of the defect.  The graft was then placed in the primary defect and oriented appropriately.

## 2022-10-28 NOTE — DIETITIAN INITIAL EVALUATION ADULT. - ADD RECOMMEND
Telephone report given to [employer], RN.      Chao Contreras RN  10/28/22 [id number] encourage po and supplement intake
